# Patient Record
Sex: MALE | Race: WHITE | NOT HISPANIC OR LATINO | Employment: OTHER | URBAN - METROPOLITAN AREA
[De-identification: names, ages, dates, MRNs, and addresses within clinical notes are randomized per-mention and may not be internally consistent; named-entity substitution may affect disease eponyms.]

---

## 2019-09-15 ENCOUNTER — APPOINTMENT (OUTPATIENT)
Dept: CT IMAGING | Facility: HOSPITAL | Age: 84
End: 2019-09-15

## 2019-09-15 ENCOUNTER — APPOINTMENT (OUTPATIENT)
Dept: GENERAL RADIOLOGY | Facility: HOSPITAL | Age: 84
End: 2019-09-15

## 2019-09-15 ENCOUNTER — APPOINTMENT (OUTPATIENT)
Dept: CARDIOLOGY | Facility: HOSPITAL | Age: 84
End: 2019-09-15

## 2019-09-15 ENCOUNTER — HOSPITAL ENCOUNTER (INPATIENT)
Facility: HOSPITAL | Age: 84
LOS: 5 days | Discharge: HOME | End: 2019-09-20
Attending: INTERNAL MEDICINE | Admitting: INTERNAL MEDICINE

## 2019-09-15 ENCOUNTER — HOSPITAL ENCOUNTER (EMERGENCY)
Facility: HOSPITAL | Age: 84
Discharge: ANOTHER HOSPITAL | End: 2019-09-15
Admitting: EMERGENCY MEDICINE

## 2019-09-15 VITALS
TEMPERATURE: 98.3 F | OXYGEN SATURATION: 99 % | DIASTOLIC BLOOD PRESSURE: 53 MMHG | RESPIRATION RATE: 19 BRPM | BODY MASS INDEX: 25.18 KG/M2 | SYSTOLIC BLOOD PRESSURE: 110 MMHG | HEIGHT: 69 IN | HEART RATE: 70 BPM | WEIGHT: 170 LBS

## 2019-09-15 DIAGNOSIS — R53.1 WEAKNESS: ICD-10-CM
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
## 2019-09-15 DIAGNOSIS — I63.9 ISCHEMIC STROKE (HCC): ICD-10-CM

## 2019-09-15 LAB
ABO GROUP BLD: NORMAL
ALBUMIN SERPL-MCNC: 3.4 G/DL (ref 3.5–4.8)
ALBUMIN/GLOB SERPL: 1.1 G/DL (ref 1–1.7)
ALP SERPL-CCNC: 116 U/L (ref 32–91)
ALT SERPL W P-5'-P-CCNC: 17 U/L (ref 17–63)
ANION GAP SERPL CALCULATED.3IONS-SCNC: 10.9 MMOL/L (ref 5–15)
APTT PPP: 25.6 SECONDS (ref 24–31)
AST SERPL-CCNC: 24 U/L (ref 15–41)
BACTERIA UR QL AUTO: ABNORMAL /HPF
BASOPHILS # BLD AUTO: 0.1 10*3/MM3 (ref 0–0.2)
BASOPHILS NFR BLD AUTO: 2 % (ref 0–1.5)
BILIRUB SERPL-MCNC: 0.2 MG/DL (ref 0.3–1.2)
BILIRUB UR QL STRIP: NEGATIVE
BLD GP AB SCN SERPL QL: NEGATIVE
BUN BLD-MCNC: 20 MG/DL (ref 8–20)
BUN/CREAT SERPL: 14.3 (ref 6.2–20.3)
CALCIUM SPEC-SCNC: 8.9 MG/DL (ref 8.9–10.3)
CHLORIDE SERPL-SCNC: 107 MMOL/L (ref 101–111)
CLARITY UR: CLEAR
CO2 SERPL-SCNC: 25 MMOL/L (ref 22–32)
COLOR UR: YELLOW
CREAT BLD-MCNC: 1.4 MG/DL (ref 0.7–1.2)
DEPRECATED RDW RBC AUTO: 46.4 FL (ref 37–54)
EOSINOPHIL # BLD AUTO: 0.4 10*3/MM3 (ref 0–0.4)
EOSINOPHIL NFR BLD AUTO: 6.4 % (ref 0.3–6.2)
ERYTHROCYTE [DISTWIDTH] IN BLOOD BY AUTOMATED COUNT: 13.3 % (ref 12.3–15.4)
FOLATE SERPL-MCNC: 18.7 NG/ML (ref 5.9–24.8)
GFR SERPL CREATININE-BSD FRML MDRD: 48 ML/MIN/1.73
GLOBULIN UR ELPH-MCNC: 3 GM/DL (ref 2.5–3.8)
GLUCOSE BLD-MCNC: 109 MG/DL (ref 65–99)
GLUCOSE BLDC GLUCOMTR-MCNC: 103 MG/DL (ref 70–130)
GLUCOSE BLDC GLUCOMTR-MCNC: 98 MG/DL (ref 70–105)
GLUCOSE UR STRIP-MCNC: NEGATIVE MG/DL
HCT VFR BLD AUTO: 41 % (ref 37.5–51)
HGB BLD-MCNC: 14 G/DL (ref 13–17.7)
HGB UR QL STRIP.AUTO: ABNORMAL
HOLD SPECIMEN: NORMAL
HOLD SPECIMEN: NORMAL
HYALINE CASTS UR QL AUTO: ABNORMAL /LPF
INR PPP: 1.05 (ref 0.9–1.1)
KETONES UR QL STRIP: NEGATIVE
LEUKOCYTE ESTERASE UR QL STRIP.AUTO: NEGATIVE
LYMPHOCYTES # BLD AUTO: 1.6 10*3/MM3 (ref 0.7–3.1)
LYMPHOCYTES NFR BLD AUTO: 27.8 % (ref 19.6–45.3)
MCH RBC QN AUTO: 34.4 PG (ref 26.6–33)
MCHC RBC AUTO-ENTMCNC: 34.1 G/DL (ref 31.5–35.7)
MCV RBC AUTO: 101.1 FL (ref 79–97)
MONOCYTES # BLD AUTO: 0.6 10*3/MM3 (ref 0.1–0.9)
MONOCYTES NFR BLD AUTO: 10 % (ref 5–12)
NEUTROPHILS # BLD AUTO: 3.1 10*3/MM3 (ref 1.7–7)
NEUTROPHILS NFR BLD AUTO: 53.8 % (ref 42.7–76)
NITRITE UR QL STRIP: NEGATIVE
NRBC BLD AUTO-RTO: 0 /100 WBC (ref 0–0.2)
PH UR STRIP.AUTO: 7 [PH] (ref 5–8)
PHENYTOIN SERPL-MCNC: 8.9 MCG/ML (ref 10–20)
PLATELET # BLD AUTO: 160 10*3/MM3 (ref 140–450)
PMV BLD AUTO: 7.7 FL (ref 6–12)
POTASSIUM BLD-SCNC: 3.9 MMOL/L (ref 3.6–5.1)
PROT SERPL-MCNC: 6.4 G/DL (ref 6.1–7.9)
PROT UR QL STRIP: NEGATIVE
PROTHROMBIN TIME: 10.7 SECONDS (ref 9.6–11.7)
RBC # BLD AUTO: 4.05 10*6/MM3 (ref 4.14–5.8)
RBC # UR: ABNORMAL /HPF
REF LAB TEST METHOD: ABNORMAL
RH BLD: POSITIVE
SODIUM BLD-SCNC: 139 MMOL/L (ref 136–144)
SP GR UR STRIP: >=1.03 (ref 1–1.03)
SQUAMOUS #/AREA URNS HPF: ABNORMAL /HPF
T&S EXPIRATION DATE: NORMAL
TROPONIN I SERPL-MCNC: <0.03 NG/ML (ref 0–0.03)
TSH SERPL DL<=0.05 MIU/L-ACNC: 4.42 UIU/ML (ref 0.34–5.6)
UROBILINOGEN UR QL STRIP: ABNORMAL
VIT B12 BLD-MCNC: 276 PG/ML (ref 180–914)
WBC NRBC COR # BLD: 5.7 10*3/MM3 (ref 3.4–10.8)
WBC UR QL AUTO: ABNORMAL /HPF
WHOLE BLOOD HOLD SPECIMEN: NORMAL
WHOLE BLOOD HOLD SPECIMEN: NORMAL

## 2019-09-15 PROCEDURE — 93306 TTE W/DOPPLER COMPLETE: CPT | Performed by: INTERNAL MEDICINE

## 2019-09-15 PROCEDURE — 70498 CT ANGIOGRAPHY NECK: CPT

## 2019-09-15 PROCEDURE — 93005 ELECTROCARDIOGRAM TRACING: CPT | Performed by: PHYSICIAN ASSISTANT

## 2019-09-15 PROCEDURE — 71045 X-RAY EXAM CHEST 1 VIEW: CPT

## 2019-09-15 PROCEDURE — 25010000002 ALTEPLASE PER 1 MG: Performed by: NURSE PRACTITIONER

## 2019-09-15 PROCEDURE — 86850 RBC ANTIBODY SCREEN: CPT | Performed by: PHYSICIAN ASSISTANT

## 2019-09-15 PROCEDURE — 0042T HC CT CEREBRAL PERFUSION W/WO CONTRAST: CPT

## 2019-09-15 PROCEDURE — 99214 OFFICE O/P EST MOD 30 MIN: CPT | Performed by: NURSE PRACTITIONER

## 2019-09-15 PROCEDURE — 82962 GLUCOSE BLOOD TEST: CPT

## 2019-09-15 PROCEDURE — 82746 ASSAY OF FOLIC ACID SERUM: CPT | Performed by: PSYCHIATRY & NEUROLOGY

## 2019-09-15 PROCEDURE — 86900 BLOOD TYPING SEROLOGIC ABO: CPT

## 2019-09-15 PROCEDURE — 86901 BLOOD TYPING SEROLOGIC RH(D): CPT

## 2019-09-15 PROCEDURE — 0 IOPAMIDOL PER 1 ML: Performed by: INTERNAL MEDICINE

## 2019-09-15 PROCEDURE — 85610 PROTHROMBIN TIME: CPT | Performed by: PHYSICIAN ASSISTANT

## 2019-09-15 PROCEDURE — 25010000002 FOSPHENYTOIN 500 MG PE/10ML SOLUTION 10 ML VIAL: Performed by: PSYCHIATRY & NEUROLOGY

## 2019-09-15 PROCEDURE — 93306 TTE W/DOPPLER COMPLETE: CPT

## 2019-09-15 PROCEDURE — 85730 THROMBOPLASTIN TIME PARTIAL: CPT | Performed by: PHYSICIAN ASSISTANT

## 2019-09-15 PROCEDURE — 0 IOPAMIDOL PER 1 ML: Performed by: PHYSICIAN ASSISTANT

## 2019-09-15 PROCEDURE — 86901 BLOOD TYPING SEROLOGIC RH(D): CPT | Performed by: PHYSICIAN ASSISTANT

## 2019-09-15 PROCEDURE — 70496 CT ANGIOGRAPHY HEAD: CPT

## 2019-09-15 PROCEDURE — 86900 BLOOD TYPING SEROLOGIC ABO: CPT | Performed by: PHYSICIAN ASSISTANT

## 2019-09-15 PROCEDURE — 80185 ASSAY OF PHENYTOIN TOTAL: CPT | Performed by: PSYCHIATRY & NEUROLOGY

## 2019-09-15 PROCEDURE — 81001 URINALYSIS AUTO W/SCOPE: CPT | Performed by: INTERNAL MEDICINE

## 2019-09-15 PROCEDURE — 80053 COMPREHEN METABOLIC PANEL: CPT | Performed by: PHYSICIAN ASSISTANT

## 2019-09-15 PROCEDURE — 84443 ASSAY THYROID STIM HORMONE: CPT | Performed by: PSYCHIATRY & NEUROLOGY

## 2019-09-15 PROCEDURE — 99291 CRITICAL CARE FIRST HOUR: CPT

## 2019-09-15 PROCEDURE — 25010000002 ALTEPLASE PER 1 MG

## 2019-09-15 PROCEDURE — 85025 COMPLETE CBC W/AUTO DIFF WBC: CPT | Performed by: PHYSICIAN ASSISTANT

## 2019-09-15 PROCEDURE — 70450 CT HEAD/BRAIN W/O DYE: CPT

## 2019-09-15 PROCEDURE — 82607 VITAMIN B-12: CPT | Performed by: PSYCHIATRY & NEUROLOGY

## 2019-09-15 PROCEDURE — 99221 1ST HOSP IP/OBS SF/LOW 40: CPT | Performed by: PSYCHIATRY & NEUROLOGY

## 2019-09-15 PROCEDURE — 84484 ASSAY OF TROPONIN QUANT: CPT | Performed by: PHYSICIAN ASSISTANT

## 2019-09-15 RX ORDER — ACETYLCYSTEINE 200 MG/ML
600 SOLUTION ORAL; RESPIRATORY (INHALATION)
Status: COMPLETED | OUTPATIENT
Start: 2019-09-15 | End: 2019-09-17

## 2019-09-15 RX ORDER — SODIUM CHLORIDE 0.9 % (FLUSH) 0.9 %
10 SYRINGE (ML) INJECTION EVERY 12 HOURS SCHEDULED
Status: DISCONTINUED | OUTPATIENT
Start: 2019-09-15 | End: 2019-09-20 | Stop reason: HOSPADM

## 2019-09-15 RX ORDER — ASPIRIN 81 MG/1
81 TABLET, CHEWABLE ORAL DAILY
COMMUNITY
End: 2019-11-06 | Stop reason: DRUGHIGH

## 2019-09-15 RX ORDER — ASPIRIN 325 MG
325 TABLET ORAL DAILY
Status: DISCONTINUED | OUTPATIENT
Start: 2019-09-16 | End: 2019-09-18

## 2019-09-15 RX ORDER — SODIUM CHLORIDE 0.9 % (FLUSH) 0.9 %
10 SYRINGE (ML) INJECTION AS NEEDED
Status: DISCONTINUED | OUTPATIENT
Start: 2019-09-15 | End: 2019-09-20 | Stop reason: HOSPADM

## 2019-09-15 RX ORDER — ACETAMINOPHEN 325 MG/1
650 TABLET ORAL EVERY 6 HOURS PRN
Status: DISCONTINUED | OUTPATIENT
Start: 2019-09-15 | End: 2019-09-20 | Stop reason: HOSPADM

## 2019-09-15 RX ORDER — SODIUM CHLORIDE 9 MG/ML
100 INJECTION, SOLUTION INTRAVENOUS CONTINUOUS
Status: DISCONTINUED | OUTPATIENT
Start: 2019-09-15 | End: 2019-09-15

## 2019-09-15 RX ORDER — ASPIRIN 300 MG/1
300 SUPPOSITORY RECTAL DAILY
Status: DISCONTINUED | OUTPATIENT
Start: 2019-09-16 | End: 2019-09-18

## 2019-09-15 RX ORDER — SODIUM CHLORIDE 0.9 % (FLUSH) 0.9 %
10 SYRINGE (ML) INJECTION AS NEEDED
Status: DISCONTINUED | OUTPATIENT
Start: 2019-09-15 | End: 2019-09-15 | Stop reason: HOSPADM

## 2019-09-15 RX ORDER — ATORVASTATIN CALCIUM 80 MG/1
80 TABLET, FILM COATED ORAL NIGHTLY
Status: DISCONTINUED | OUTPATIENT
Start: 2019-09-15 | End: 2019-09-18

## 2019-09-15 RX ORDER — SODIUM CHLORIDE 9 MG/ML
100 INJECTION, SOLUTION INTRAVENOUS ONCE
Status: COMPLETED | OUTPATIENT
Start: 2019-09-15 | End: 2019-09-15

## 2019-09-15 RX ORDER — PHENYTOIN SODIUM 100 MG/1
200 CAPSULE, EXTENDED RELEASE ORAL EVERY 12 HOURS SCHEDULED
Status: DISCONTINUED | OUTPATIENT
Start: 2019-09-16 | End: 2019-09-16

## 2019-09-15 RX ORDER — SODIUM CHLORIDE 9 MG/ML
100 INJECTION, SOLUTION INTRAVENOUS CONTINUOUS
Status: DISCONTINUED | OUTPATIENT
Start: 2019-09-15 | End: 2019-09-16

## 2019-09-15 RX ORDER — PHENYTOIN SODIUM 100 MG/1
CAPSULE, EXTENDED RELEASE ORAL
COMMUNITY
End: 2019-09-20 | Stop reason: HOSPADM

## 2019-09-15 RX ADMIN — SODIUM CHLORIDE 100 ML/HR: 9 INJECTION, SOLUTION INTRAVENOUS at 21:36

## 2019-09-15 RX ADMIN — ATORVASTATIN CALCIUM 80 MG: 80 TABLET, FILM COATED ORAL at 21:29

## 2019-09-15 RX ADMIN — ALTEPLASE 69.3 MG: KIT at 07:38

## 2019-09-15 RX ADMIN — SODIUM CHLORIDE 500 MG PE: 9 INJECTION, SOLUTION INTRAVENOUS at 21:30

## 2019-09-15 RX ADMIN — ACETYLCYSTEINE 600 MG: 200 SOLUTION ORAL; RESPIRATORY (INHALATION) at 21:29

## 2019-09-15 RX ADMIN — IOPAMIDOL 150 ML: 755 INJECTION, SOLUTION INTRAVENOUS at 09:18

## 2019-09-15 RX ADMIN — SODIUM CHLORIDE, PRESERVATIVE FREE 10 ML: 5 INJECTION INTRAVENOUS at 13:39

## 2019-09-15 RX ADMIN — ALTEPLASE 69.3 MG: KIT at 07:36

## 2019-09-15 RX ADMIN — SODIUM CHLORIDE, PRESERVATIVE FREE 10 ML: 5 INJECTION INTRAVENOUS at 21:30

## 2019-09-15 RX ADMIN — IOPAMIDOL 100 ML: 755 INJECTION, SOLUTION INTRAVENOUS at 07:09

## 2019-09-15 RX ADMIN — ACETAMINOPHEN 650 MG: 325 TABLET, FILM COATED ORAL at 21:29

## 2019-09-15 RX ADMIN — SODIUM CHLORIDE 100 ML: 900 INJECTION, SOLUTION INTRAVENOUS at 08:24

## 2019-09-15 RX ADMIN — SODIUM CHLORIDE 100 ML/HR: 9 INJECTION, SOLUTION INTRAVENOUS at 11:21

## 2019-09-15 NOTE — PLAN OF CARE
Problem: Patient Care Overview  Goal: Plan of Care Review  Outcome: Ongoing (interventions implemented as appropriate)   09/15/19 9116   Coping/Psychosocial   Plan of Care Reviewed With patient   Plan of Care Review   Progress improving   OTHER   Outcome Summary Pt transferred to [facility] from [facility] post TPA. CTA done upon arrival and showed no clot and did not require intervention. Symptoms seem to have resolved. Echo completed. MRI ordered but unable to complete due to the pacemaker being MRI incompatible. IVF started. Pt having a lot of urgency and burning when urinating - ua sent. Khai attempted camp placement but were unsuccessful. Bladder scan completed and pt not retaining. NPO until speech eval in AM. CT scan to be done in AM. Vitals stable. Will continue to monitor per orders.      Goal: Individualization and Mutuality  Outcome: Ongoing (interventions implemented as appropriate)    Goal: Discharge Needs Assessment  Outcome: Ongoing (interventions implemented as appropriate)    Goal: Interprofessional Rounds/Family Conf  Outcome: Ongoing (interventions implemented as appropriate)

## 2019-09-15 NOTE — NURSING NOTE
MRI tech called and said the type of pacemaker the patient has is unsafe for MRI.   Pt's family brought in pacemaker card and confirmed the pacemaker is incompatible with MRI.

## 2019-09-15 NOTE — CONSULTS
Neurology Note    Patient:  Neptali Vera    YOB: 1933    REFERRING PHYSICIAN:  Aniceto Dexter MD    CHIEF COMPLAINT:    Left sided weakness    HISTORY OF PRESENT ILLNESS:   The patient is a 85 y.o. male with SSS, s/p PPM, HTN, seizure disorder. He got up to the BR around 0530 today, had a fall, no trauma that alerted his family. He seemed normal after after the fall but shortly after started drooling and not following commands, noted to have left sided facial droop and weakness. Taken to Ed at [place] where he was noted to have left arm and leg numbness and weakness, left facial weakness, slow speech, NIH 6, /119, CT head negative for bleed, CTA w distal right M1 occlusion. He was given IV TPA, transferred here for a possible thrombectomy. H5s CT/CTP here were negative, CTA without MCA thrombus, question of distal LVA occlusion. His symptoms have resolved at this point. He has had what family reports as TIAs in the past, started on Dilantin for possible seizures, incomplete data base.      Past Medical History:  Past Medical History:   Diagnosis Date   • Hypertension    • Seizure (CMS/HCC)        Past Surgical History:  No past surgical history on file.    Social History:   Social History     Socioeconomic History   • Marital status:      Spouse name: Not on file   • Number of children: Not on file   • Years of education: Not on file   • Highest education level: Not on file   Tobacco Use   • Smoking status: Never Smoker   • Smokeless tobacco: Never Used   Substance and Sexual Activity   • Alcohol use: No     Frequency: Never   • Drug use: No   • Sexual activity: Defer        Family History:   No family history on file.    Medications Prior to Admission:    Prior to Admission medications    Medication Sig Start Date End Date Taking? Authorizing Provider   aspirin 81 MG chewable tablet Chew 81 mg Daily.    Provider, MD Lis   phenytoin (DILANTIN) 100 MG ER capsule Take  by mouth.     Provider, Historical, MD       Allergies:  Patient has no known allergies.      Review of system  Review of Systems   Neurological: Negative for weakness.   All other systems reviewed and are negative.      Vitals:    09/15/19 1045   BP:    Pulse: 61   Resp:    SpO2: 99%       Physical exam  Physical Exam   Constitutional: He is oriented to person, place, and time. He appears well-developed and well-nourished.   Eyes: EOM are normal. Pupils are equal, round, and reactive to light.   Neck: Carotid bruit is not present.   Cardiovascular: Normal rate and regular rhythm.   Pulmonary/Chest: Effort normal.   Neurological: He is alert and oriented to person, place, and time. He has normal strength and normal reflexes. No cranial nerve deficit or sensory deficit. He displays no Babinski's sign on the right side. He displays no Babinski's sign on the left side.   Psychiatric: He has a normal mood and affect. His behavior is normal. Thought content normal.         Lab Results   Component Value Date    WBC 5.70 09/15/2019    HGB 14.0 09/15/2019    HCT 41.0 09/15/2019    .1 (H) 09/15/2019     09/15/2019     Lab Results   Component Value Date    GLUCOSE 109 (H) 09/15/2019    BUN 20 09/15/2019    CREATININE 1.40 (H) 09/15/2019    EGFRIFNONA 48 (L) 09/15/2019    BCR 14.3 09/15/2019    CO2 25.0 09/15/2019    CALCIUM 8.9 09/15/2019    ALBUMIN 3.40 (L) 09/15/2019    AST 24 09/15/2019    ALT 17 09/15/2019     Contains abnormal data ECG 12 Lead   Order: [id number]   Status:  Preliminary result   Visible to patient:  No (Not Released)   Next appt:  None      ED Interpretation     Liz Nicole PA     9/15/2019  8:51 AM  ECG 12 Lead    Date/Time: 9/15/2019 7:32 AM  Performed by: Liz Nicole PA  Authorized by: Liz Nicole PA   Interpreted by physician (Dr. Hartman)  Previous ECG: no previous ECG available  Rhythm: paced  Rate: normal  BPM: 61  Conduction: non-specific intraventricular conduction delay  ST  Segments: ST segments normal  T Waves: T waves normal  Other: no other findings  Clinical impression: abnormal ECG   Interpreted by Liz Nicole PA on 9/15/2019  8:51 AM       Last Resulted: 09/15/19 08:51               Radiological Studies:   Ct Angiogram Head With & Without Contrast    Result Date: 9/15/2019  CT ANGIOGRAM HEAD AND NECK AND CT PERFUSION STUDY  CLINICAL HISTORY: Stroke.  Radiation dose reduction techniques were utilized, including automated exposure control and exposure modulation based on body size. CT scan of the head was obtained with 3 mm axial images without the use of IV contrast. The patient underwent a CT perfusion study with a dynamic bolus of IV contrast. Standard perfusion maps were constructed. The patient then underwent a CT angiogram of the head and neck with 1 mm axial images following the administration of IV contrast. Sagittal, coronal, and 3-dimensional reconstructed images were obtained.  Percent stenosis was assessed in accordance with NASCET criteria.  COMPARISON: None available  FINDINGS:  NONCONTRAST HEAD CT: Residual contrast material is present in the vasculature related to prior imaging, limiting assessment for intracranial hemorrhage. No acute hemorrhage or hydrocephalus is identified. No midline shift. Hypodensities at the bilateral occipital lobes suggest chronic or potentially subacute areas of infarct. Mild to moderate periventricular hypodensities suggest chronic small vessel ischemic change in a patient this age.  No enhancing lesions the brain are noted following contrast material administration.   CT PERFUSION STUDY:  No CBF (under 30%) deficit or perfusion abnormality is demonstrated where the T MAX is greater than 6 seconds. The calculated mismatch volume was 0 cc.  CTA HEAD and neck: The CT angiogram of the head and neck demonstrates occlusion of a 7 mm segment of the intracanalicular portion of the left vertebral artery, for example image 95 of series 6.  Left vertebral artery is dominant. Focal narrowing is apparent at the junction of the right vertebral artery and the basilar artery, percent stenosis difficult to characterize owing to very small caliber of the vessel. Scattered areas of mild arterial narrowing are seen (0-49% narrowing), for example 40% narrowing at the origin of the left vertebral artery, estimated 40% narrowing in the supraclinoid segment of the right ICA. The right A1 segment is apparently undeveloped, the anterior circulation being provided from the left. The bilateral posterior cerebral arteries are fairly diminutive, but without focal high-grade stenosis. Otherwise, no hemodynamically significant focal stenosis, aneurysm, or dissection in the cervical carotid or vertebral arteries, or in the arteries at the base of the brain.   Fibronodular changes at the lung apices. Left-sided pacemaker, only partly included.  Thyroid nodules are evident, follow-up thyroid ultrasound can be obtained.         1. No perfusion abnormality to suggest completed or threatened acute infarct. 2. 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 Scattered areas of arterial narrowing, as detailed above.  3. No acute intracranial hemorrhage or hydrocephalus identified, limited by pre-existing intravascular contrast material on the unenhanced portion of the exam. No enhancing lesions of brain. Hypodensity in the bilateral occipital lobes suggesting chronic to subacute areas infarct. 4. Thyroid nodularity.      Discussed by telephone with Dr. Braun at [phone], 09/15/2019  This report was finalized on 9/15/2019 9:48 AM by Dr. Geovanni Martinez M.D.      Ct Angiogram Head With & Without Contrast    Result Date: 9/15/2019  Distal right M1 occlusion without significant collateral formation and asymmetric enhancement of the right cerebral hemisphere concerning for acute occlusion. Right HAYDEE supplied by the left sided circulation via prominent  ACOM. No right A1 segment is seen. Irregular calcified and non calcified plaque within the right clinoid and supraclinoid segments of the right ICA. Mild non hemodynamically significant plaque within the bilateral proximal ICAs within the neck. Patent vertebral arteries. Full report to follow by IR. Findings discussed with TAMMIE Barnes with Neurology at 7:52 am.     Ct Angiogram Neck With & Without Contrast    Result Date: 9/15/2019  CT ANGIOGRAM HEAD AND NECK AND CT PERFUSION STUDY  CLINICAL HISTORY: Stroke.  Radiation dose reduction techniques were utilized, including automated exposure control and exposure modulation based on body size. CT scan of the head was obtained with 3 mm axial images without the use of IV contrast. The patient underwent a CT perfusion study with a dynamic bolus of IV contrast. Standard perfusion maps were constructed. The patient then underwent a CT angiogram of the head and neck with 1 mm axial images following the administration of IV contrast. Sagittal, coronal, and 3-dimensional reconstructed images were obtained.  Percent stenosis was assessed in accordance with NASCET criteria.  COMPARISON: None available  FINDINGS:  NONCONTRAST HEAD CT: Residual contrast material is present in the vasculature related to prior imaging, limiting assessment for intracranial hemorrhage. No acute hemorrhage or hydrocephalus is identified. No midline shift. Hypodensities at the bilateral occipital lobes suggest chronic or potentially subacute areas of infarct. Mild to moderate periventricular hypodensities suggest chronic small vessel ischemic change in a patient this age.  No enhancing lesions the brain are noted following contrast material administration.   CT PERFUSION STUDY:  No CBF (under 30%) deficit or perfusion abnormality is demonstrated where the T MAX is greater than 6 seconds. The calculated mismatch volume was 0 cc.  CTA HEAD and neck: The CT angiogram of the head and neck demonstrates  occlusion of a 7 mm segment of the intracanalicular portion of the left vertebral artery, for example image 95 of series 6. Left vertebral artery is dominant. Focal narrowing is apparent at the junction of the right vertebral artery and the basilar artery, percent stenosis difficult to characterize owing to very small caliber of the vessel. Scattered areas of mild arterial narrowing are seen (0-49% narrowing), for example 40% narrowing at the origin of the left vertebral artery, estimated 40% narrowing in the supraclinoid segment of the right ICA. The right A1 segment is apparently undeveloped, the anterior circulation being provided from the left. The bilateral posterior cerebral arteries are fairly diminutive, but without focal high-grade stenosis. Otherwise, no hemodynamically significant focal stenosis, aneurysm, or dissection in the cervical carotid or vertebral arteries, or in the arteries at the base of the brain.   Fibronodular changes at the lung apices. Left-sided pacemaker, only partly included.  Thyroid nodules are evident, follow-up thyroid ultrasound can be obtained.         1. No perfusion abnormality to suggest completed or threatened acute infarct. 2. 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 Scattered areas of arterial narrowing, as detailed above.  3. No acute intracranial hemorrhage or hydrocephalus identified, limited by pre-existing intravascular contrast material on the unenhanced portion of the exam. No enhancing lesions of brain. Hypodensity in the bilateral occipital lobes suggesting chronic to subacute areas infarct. 4. Thyroid nodularity.      Discussed by telephone with Dr. Braun at [phone], 09/15/2019  This report was finalized on 9/15/2019 9:48 AM by Dr. Geovanni Martinez M.D.      Ct Angiogram Neck With & Without Contrast    Result Date: 9/15/2019  Please see prelim report under CTA Head. Full report to follow by IR.    Xr Chest 1 View    Result  Date: 9/15/2019  Examination: XR CHEST 1 VW-  Date of Exam: 9/15/2019 7:45 AM  Indication: Acute Stroke Protocol (onset < 12 hrs).  Comparison: None available  Technique: Portable AP view of the chest was obtained.  Findings: There is a left-sided chest wall pacemaker with leads projecting over the right atrium and right ventricle. There is mild cardiomegaly. There are coarsened interstitial markings likely related to underlying chronic lung disease or less likely interstitial edema. The lung bases appear clear. There is no pleural effusion or pneumothorax. Multilevel degenerative changes of the thoracic spine.      Mild cardiomegaly with coarsened interstitial markings likely related to underlying chronic lung disease with interstitial edema felt to be less likely.  Electronically Signed By-Mick George On:9/15/2019 7:56 AM This report was finalized on [id number] by  Mick George, .    Ct Head Without Contrast Stroke Protocol    Result Date: 9/15/2019  EXAMINATION: CT HEAD WO CONTRAST STROKE PROTOCOL DATE: 9/15/2019 6:50 AM  INDICATION: Status post fall, loss of consciousness  COMPARISON: None available.  TECHNIQUE: Thin section noncontrast axial images were obtained through the head. Coronal reformats were created.  CT dose lowering techniques were used, to include: automated exposure control, adjustment for patient size, and or use of iterative reconstruction.  FINDINGS:  Intracranial contents: No acute intracranial hemorrhage, evidence of acute territorial infarct, mass, mass effect or hydrocephalus. Extensive intracranial atherosclerosis. Extensive chronic small vessel ischemic changes in the white matter. Chronic lacunar infarct in the right  caudate head. Chronic infarcts in the bilateral occipital lobes and bilateral cerebellum. Moderate cerebral volume loss. Bones and extracranial soft tissues:  No fracture or focal osseous lesion in the calvarium or skull base. Visualized paranasal sinuses and  mastoid air cells are clear. Bilateral cataract surgery.      1. No acute intracranial abnormality visible by CT. 2. Chronic infarcts in the bilateral occipital lobes, bilateral cerebellum and right caudate head. Extensive chronic small vessel ischemic change in the white matter. 3. No fracture.  Discussed with LeanaArlette Nicole in the emergency room at 7:03 AM on 9/15/2019. This examination was interpreted by Ford Richter M.D. Electronically signed by:  Ford Richter M.D.  9/15/2019 5:06 AM    Ct Cerebral Perfusion With & Without Contrast    Result Date: 9/15/2019  CT ANGIOGRAM HEAD AND NECK AND CT PERFUSION STUDY  CLINICAL HISTORY: Stroke.  Radiation dose reduction techniques were utilized, including automated exposure control and exposure modulation based on body size. CT scan of the head was obtained with 3 mm axial images without the use of IV contrast. The patient underwent a CT perfusion study with a dynamic bolus of IV contrast. Standard perfusion maps were constructed. The patient then underwent a CT angiogram of the head and neck with 1 mm axial images following the administration of IV contrast. Sagittal, coronal, and 3-dimensional reconstructed images were obtained.  Percent stenosis was assessed in accordance with NASCET criteria.  COMPARISON: None available  FINDINGS:  NONCONTRAST HEAD CT: Residual contrast material is present in the vasculature related to prior imaging, limiting assessment for intracranial hemorrhage. No acute hemorrhage or hydrocephalus is identified. No midline shift. Hypodensities at the bilateral occipital lobes suggest chronic or potentially subacute areas of infarct. Mild to moderate periventricular hypodensities suggest chronic small vessel ischemic change in a patient this age.  No enhancing lesions the brain are noted following contrast material administration.   CT PERFUSION STUDY:  No CBF (under 30%) deficit or perfusion abnormality is demonstrated where the T MAX is  greater than 6 seconds. The calculated mismatch volume was 0 cc.  CTA HEAD and neck: The CT angiogram of the head and neck demonstrates occlusion of a 7 mm segment of the intracanalicular portion of the left vertebral artery, for example image 95 of series 6. Left vertebral artery is dominant. Focal narrowing is apparent at the junction of the right vertebral artery and the basilar artery, percent stenosis difficult to characterize owing to very small caliber of the vessel. Scattered areas of mild arterial narrowing are seen (0-49% narrowing), for example 40% narrowing at the origin of the left vertebral artery, estimated 40% narrowing in the supraclinoid segment of the right ICA. The right A1 segment is apparently undeveloped, the anterior circulation being provided from the left. The bilateral posterior cerebral arteries are fairly diminutive, but without focal high-grade stenosis. Otherwise, no hemodynamically significant focal stenosis, aneurysm, or dissection in the cervical carotid or vertebral arteries, or in the arteries at the base of the brain.   Fibronodular changes at the lung apices. Left-sided pacemaker, only partly included.  Thyroid nodules are evident, follow-up thyroid ultrasound can be obtained.         1. No perfusion abnormality to suggest completed or threatened acute infarct. 2. 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 Scattered areas of arterial narrowing, as detailed above.  3. No acute intracranial hemorrhage or hydrocephalus identified, limited by pre-existing intravascular contrast material on the unenhanced portion of the exam. No enhancing lesions of brain. Hypodensity in the bilateral occipital lobes suggesting chronic to subacute areas infarct. 4. Thyroid nodularity.      Discussed by telephone with Dr. Braun at [phone], 09/15/2019  This report was finalized on 9/15/2019 9:48 AM by Dr. Geovanni Martinez M.D.        During this visit the following  were done:  Labs Reviewed []    Labs Ordered []    Radiology Reports Reviewed []    Radiology Ordered []    EKG, echo, and/or stress test reviewed []    EEG results reviewed  []    EEG reviewed and interpreted per myself   []    Discussed case with neurointerventionalist or neuroradiologist []    Referring Provider Records Reviewed []    ER Records Reviewed []    Hospital Records Reviewed []    History Obtained From Family []    Radiological images view and Interpreted per myself []    Case Discussed with referring provider []     Decision to obtain and request outside records  []        Assessment and Plan     Threatened RMCA stroke, 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 Less likely partial seizure. Likely cardiac embolic event. LVA occlusion likely incidental to symptoms. Question of partial seizure d/o vs TIAs.     - ICU observation per TPA protocol.   - TTE.   - CTH in am.   - MRI brain w PPM protocol if compatible.   - EEG.   - b12, folate, TSH, phenytoin level.   - NPO until cleared.   - IVF.   - SBP >120, <180.   - Cardiology consult for PPM check.   - ST, OT, PT.   - Consider starting oral AC on discharge.      Thanks,        Electronically signed by Obinna Braun MD on 9/15/2019 at 11:02 AM

## 2019-09-15 NOTE — H&P
Patient Care Team:  Provider, No Known as PCP - General    Chief complaint:  Suspected stroke    History of present illness:  Subjective   History is limited.  He is not confused but somewhat poor historian.    This is an 85-year-old male patient with history of HTN and sick sinus syndrome.  He stated that he got up to use the bathroom and fell so he was taken to the ED.  He stated that he was not sure whether he got dizzy before the event but denies weakness, numbness or seizure-like activities.    Per reports, he was found by family with left-sided weakness and slurred speech.  He was taken to [facility] ED and had TPA.  CT perfusion images of the brain suspected right MCA occlusion.  Our neurology and neuro interventional service was contacted and they recommended transfer to our facility for possible intervention.  However, on arrival to our intensive care unit, patient was noted to be normal on the exam with no apparent weakness and his NIH was 1 initially.  His CT perfusion images were normal.        Review of Systems:  Constitutional: No fever or chills.   ENMT: No sinus congestion or postnasal drip  Cardiovascular: No chest pain, palpitation or legs swelling.    Respiratory: No dyspnea, cough or wheezing.  Gastrointestinal: No constipation, diarrhea or abdominal pain   Neurology: No headache, weakness, numbness or dizziness.   Musculoskeletal: No joints pain, stiffness or swelling.   Psychiatry: No depression or anxiety.  Genitourinary: Reported dysuria and urinary frequency.  Per staff, he has been urinating about 20 mL at the time multiple times  Endo: No weight changes. No cold or warm intolerance.  Lymphatic: No swollen glands.  Integumentary: No rash.    History  Past Medical History:   Diagnosis Date   • Hypertension    • Seizure (CMS/HCC)    · Sick sinus syndrome   · Hyperlipidemia      PSH:  • CARDIAC PACEMAKER PLACEMENT 8/10/2009 Medtronic dual chamber   • CATARACT EXTRACTION,  BILATERAL Bilateral       Social History     Tobacco Use   • Smoking status: Never Smoker   • Smokeless tobacco: Never Used   Substance Use Topics   • Alcohol use: No     Frequency: Never   • Drug use: No         Medications Prior to Admission   Medication Sig Dispense Refill Last Dose   • aspirin 81 MG chewable tablet Chew 81 mg Daily.      • phenytoin (DILANTIN) 100 MG ER capsule Take  by mouth.      · Lisinopril (PRINIVIL,ZESTRIL) 40 MG tablet daily  · Vitamin D    Allergies:  Patient has no known allergies.     Family hx:  -ve for CAD and stroke    Objective   Vital Signs  Temp:  [97.4 °F (36.3 °C)-98.7 °F (37.1 °C)] 98.7 °F (37.1 °C)  Heart Rate:  [57-87] 60  Resp:  [16-25] 16  BP: (102-174)/() 129/77    Physical Exam:  Constitutional: Not in acute distress.  Elderly white male patient appears to be at stated age 1  Eyes: Injected conjunctiva, EOMI. Pupils equal and reactive to light.   ENMT: Yin 3. No oral thrush. Tonsils grade 1  Neck: No thyromegaly.  Trachea midline  Heart: RRR, no murmur.  No pitting edema  Lungs: Good and equal air entry bilaterally.  Nonlabored breathing  Abdomen: Obese. Soft. No tenderness or dullness.  Positive bowel sounds  Extremities: No cyanosis, clubbing. Moves all extremities.  Warm extremities and well-perfused  Neuro: Conscious, alert, oriented x3.  Strength 5/5 overall  Psych: Appropriate mood and affect.    Integumentary: No rash or ecchymosis  Lymphatic: No palpable cervical or supraclavicular lymph nodes.      Diagnostic imaging:  I personally and independently reviewed the following images:   CXR 9/15/2019: Cardiomegaly.  Pacemaker.  No pulmonary infiltrates  CT brain: No acute infarct      Assessment   1. Suspected RMCA ischemic stroke s/p TPA 9/15/2019  2. 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 MICHELA vs CKD  4. Essential hypertension  5. Dyslipidemia  6. Sick sinus syndrome  7. Urinary frequency and dysuria  8. Microscopic  hematuria      Plan:  · Admit to ICU.  Neuro check.  CT perfusion images performed on arrival and showed no acute process.  Unclear whether his thrombus has resolved with TPA.  Will monitor post TPA for change in neuro status. obtain CT brain as needed if changes occur  · Neurology consulted  · Anticoagulation with aspirin 24 hours after TPA.  Start statin.  Lipid panel and A1c. Echo  · PT OT and speech eval  · Hold Lisinopril due to MICHELA and recent contrast  · Start IV hydration and Mucomyst for the same, to prevent contrast-induced nephropathy  · Checked UA.  1+ bacteria but no WBC, nitrite or leukocyte esterase.  Hold off on antibiotic.  Bladder scan performed and showed no retention.  Suspect his symptoms are related to attempts to insert the Lemus catheter.  We will place him on alpha Zosyn for symptoms persist but I will hold off for now in fear of dropping his blood pressure in the setting of acute stroke      I discussed the patients findings and my recommendations with patient, nursing staff and consulting provider.  (Dr. Mojica)    Aniceto Dexter MD  09/15/19  3:50 PM    Time: Critical care 36 min      This note was dictated utilizing Dragon dictation

## 2019-09-16 ENCOUNTER — APPOINTMENT (OUTPATIENT)
Dept: CT IMAGING | Facility: HOSPITAL | Age: 84
End: 2019-09-16

## 2019-09-16 LAB
ALBUMIN SERPL-MCNC: 3.2 G/DL (ref 3.5–5.2)
ANION GAP SERPL CALCULATED.3IONS-SCNC: 11.2 MMOL/L (ref 5–15)
AORTIC DIMENSIONLESS INDEX: 0.9 (DI)
BASOPHILS # BLD AUTO: 0.08 10*3/MM3 (ref 0–0.2)
BASOPHILS NFR BLD AUTO: 1.1 % (ref 0–1.5)
BH CV ECHO MEAS - ACS: 2.3 CM
BH CV ECHO MEAS - AI DEC SLOPE: 220 CM/SEC^2
BH CV ECHO MEAS - AI MAX PG: 60.5 MMHG
BH CV ECHO MEAS - AI MAX VEL: 389 CM/SEC
BH CV ECHO MEAS - AI P1/2T: 517.9 MSEC
BH CV ECHO MEAS - AO MEAN PG (FULL): 0 MMHG
BH CV ECHO MEAS - AO MEAN PG: 2 MMHG
BH CV ECHO MEAS - AO V2 MAX: 139 CM/SEC
BH CV ECHO MEAS - AO V2 MEAN: 65.9 CM/SEC
BH CV ECHO MEAS - AO V2 VTI: 21.7 CM
BH CV ECHO MEAS - AVA(I,A): 3.4 CM^2
BH CV ECHO MEAS - AVA(I,D): 3.4 CM^2
BH CV ECHO MEAS - BSA(HAYCOCK): 1.9 M^2
BH CV ECHO MEAS - BSA: 1.9 M^2
BH CV ECHO MEAS - BZI_BMI: 23.9 KILOGRAMS/M^2
BH CV ECHO MEAS - BZI_METRIC_HEIGHT: 175.3 CM
BH CV ECHO MEAS - BZI_METRIC_WEIGHT: 73.5 KG
BH CV ECHO MEAS - EDV(CUBED): 97.3 ML
BH CV ECHO MEAS - EDV(MOD-SP2): 62 ML
BH CV ECHO MEAS - EDV(MOD-SP4): 71 ML
BH CV ECHO MEAS - EDV(TEICH): 97.3 ML
BH CV ECHO MEAS - EF(CUBED): 59.6 %
BH CV ECHO MEAS - EF(MOD-SP2): 50 %
BH CV ECHO MEAS - EF(MOD-SP4): 54.9 %
BH CV ECHO MEAS - EF(TEICH): 51.3 %
BH CV ECHO MEAS - ESV(CUBED): 39.3 ML
BH CV ECHO MEAS - ESV(MOD-SP2): 31 ML
BH CV ECHO MEAS - ESV(MOD-SP4): 32 ML
BH CV ECHO MEAS - ESV(TEICH): 47.4 ML
BH CV ECHO MEAS - FS: 26.1 %
BH CV ECHO MEAS - IVS/LVPW: 1
BH CV ECHO MEAS - IVSD: 0.8 CM
BH CV ECHO MEAS - LA DIMENSION: 6 CM
BH CV ECHO MEAS - LAT PEAK E' VEL: 8 CM/SEC
BH CV ECHO MEAS - LV DIASTOLIC VOL/BSA (35-75): 37.6 ML/M^2
BH CV ECHO MEAS - LV MASS(C)D: 117.9 GRAMS
BH CV ECHO MEAS - LV MASS(C)DI: 62.4 GRAMS/M^2
BH CV ECHO MEAS - LV MEAN PG: 2 MMHG
BH CV ECHO MEAS - LV SYSTOLIC VOL/BSA (12-30): 16.9 ML/M^2
BH CV ECHO MEAS - LV V1 MAX: 98 CM/SEC
BH CV ECHO MEAS - LV V1 MEAN: 57.1 CM/SEC
BH CV ECHO MEAS - LV V1 VTI: 22 CM
BH CV ECHO MEAS - LVIDD: 4.6 CM
BH CV ECHO MEAS - LVIDS: 3.4 CM
BH CV ECHO MEAS - LVLD AP2: 8.1 CM
BH CV ECHO MEAS - LVLD AP4: 7.9 CM
BH CV ECHO MEAS - LVLS AP2: 6.8 CM
BH CV ECHO MEAS - LVLS AP4: 7 CM
BH CV ECHO MEAS - LVOT AREA (M): 3.8 CM^2
BH CV ECHO MEAS - LVOT AREA: 3.8 CM^2
BH CV ECHO MEAS - LVOT DIAM: 2.2 CM
BH CV ECHO MEAS - LVPWD: 0.8 CM
BH CV ECHO MEAS - MED PEAK E' VEL: 5 CM/SEC
BH CV ECHO MEAS - MV A DUR: 0.16 SEC
BH CV ECHO MEAS - MV A MAX VEL: 107 CM/SEC
BH CV ECHO MEAS - MV DEC SLOPE: 272 CM/SEC^2
BH CV ECHO MEAS - MV DEC TIME: 197 SEC
BH CV ECHO MEAS - MV E MAX VEL: 58.7 CM/SEC
BH CV ECHO MEAS - MV E/A: 0.55
BH CV ECHO MEAS - MV MEAN PG: 2 MMHG
BH CV ECHO MEAS - MV P1/2T MAX VEL: 77.2 CM/SEC
BH CV ECHO MEAS - MV P1/2T: 83.1 MSEC
BH CV ECHO MEAS - MV V2 MEAN: 60 CM/SEC
BH CV ECHO MEAS - MV V2 VTI: 31.6 CM
BH CV ECHO MEAS - MVA P1/2T LCG: 2.8 CM^2
BH CV ECHO MEAS - MVA(P1/2T): 2.6 CM^2
BH CV ECHO MEAS - MVA(VTI): 2.3 CM^2
BH CV ECHO MEAS - PA ACC SLOPE: 15.4 CM/SEC^2
BH CV ECHO MEAS - PA ACC TIME: 0.09 SEC
BH CV ECHO MEAS - PA MAX PG (FULL): 0.89 MMHG
BH CV ECHO MEAS - PA MAX PG: 3.9 MMHG
BH CV ECHO MEAS - PA PR(ACCEL): 39.4 MMHG
BH CV ECHO MEAS - PA V2 MAX: 99.1 CM/SEC
BH CV ECHO MEAS - PVA(V,A): 3.3 CM^2
BH CV ECHO MEAS - PVA(V,D): 3.3 CM^2
BH CV ECHO MEAS - QP/QS: 0.93
BH CV ECHO MEAS - RAP SYSTOLE: 8 MMHG
BH CV ECHO MEAS - RV MAX PG: 3 MMHG
BH CV ECHO MEAS - RV MEAN PG: 1 MMHG
BH CV ECHO MEAS - RV V1 MAX: 87.2 CM/SEC
BH CV ECHO MEAS - RV V1 MEAN: 55.6 CM/SEC
BH CV ECHO MEAS - RV V1 VTI: 18 CM
BH CV ECHO MEAS - RVOT AREA: 3.8 CM^2
BH CV ECHO MEAS - RVOT DIAM: 2.2 CM
BH CV ECHO MEAS - RVSP: 32 MMHG
BH CV ECHO MEAS - SI(CUBED): 30.7 ML/M^2
BH CV ECHO MEAS - SI(LVOT): 39 ML/M^2
BH CV ECHO MEAS - SI(MOD-SP2): 16.4 ML/M^2
BH CV ECHO MEAS - SI(MOD-SP4): 20.6 ML/M^2
BH CV ECHO MEAS - SI(TEICH): 26.4 ML/M^2
BH CV ECHO MEAS - SV(CUBED): 58 ML
BH CV ECHO MEAS - SV(LVOT): 73.7 ML
BH CV ECHO MEAS - SV(MOD-SP2): 31 ML
BH CV ECHO MEAS - SV(MOD-SP4): 39 ML
BH CV ECHO MEAS - SV(RVOT): 68.4 ML
BH CV ECHO MEAS - SV(TEICH): 49.9 ML
BH CV ECHO MEAS - TAPSE (>1.6): 1.9 CM2
BH CV ECHO MEAS - TR MAX VEL: 244 CM/SEC
BH CV ECHO MEASUREMENTS AVERAGE E/E' RATIO: 9.03
BH CV XLRA - RV BASE: 4.1 CM
BH CV XLRA - TDI S': 17 CM/SEC
BUN BLD-MCNC: 20 MG/DL (ref 8–23)
BUN/CREAT SERPL: 17.2 (ref 7–25)
CALCIUM SPEC-SCNC: 8.2 MG/DL (ref 8.6–10.5)
CHLORIDE SERPL-SCNC: 105 MMOL/L (ref 98–107)
CHOLEST SERPL-MCNC: 122 MG/DL (ref 0–200)
CO2 SERPL-SCNC: 22.8 MMOL/L (ref 22–29)
CREAT BLD-MCNC: 1.16 MG/DL (ref 0.76–1.27)
DEPRECATED RDW RBC AUTO: 48.5 FL (ref 37–54)
EOSINOPHIL # BLD AUTO: 0.3 10*3/MM3 (ref 0–0.4)
EOSINOPHIL NFR BLD AUTO: 4.3 % (ref 0.3–6.2)
ERYTHROCYTE [DISTWIDTH] IN BLOOD BY AUTOMATED COUNT: 13.2 % (ref 12.3–15.4)
GFR SERPL CREATININE-BSD FRML MDRD: 60 ML/MIN/1.73
GLUCOSE BLD-MCNC: 99 MG/DL (ref 65–99)
GLUCOSE BLDC GLUCOMTR-MCNC: 106 MG/DL (ref 70–130)
GLUCOSE BLDC GLUCOMTR-MCNC: 130 MG/DL (ref 70–130)
GLUCOSE BLDC GLUCOMTR-MCNC: 144 MG/DL (ref 70–130)
HBA1C MFR BLD: 5.68 % (ref 4.8–5.6)
HCT VFR BLD AUTO: 35.3 % (ref 37.5–51)
HDLC SERPL-MCNC: 48 MG/DL (ref 40–60)
HGB BLD-MCNC: 11.8 G/DL (ref 13–17.7)
IMM GRANULOCYTES # BLD AUTO: 0.04 10*3/MM3 (ref 0–0.05)
IMM GRANULOCYTES NFR BLD AUTO: 0.6 % (ref 0–0.5)
LDLC SERPL CALC-MCNC: 60 MG/DL (ref 0–100)
LDLC/HDLC SERPL: 1.25 {RATIO}
LEFT ATRIUM VOLUME INDEX: 12 ML/M2
LEFT ATRIUM VOLUME: 26 CM3
LV EF 2D ECHO EST: 55 %
LYMPHOCYTES # BLD AUTO: 1.62 10*3/MM3 (ref 0.7–3.1)
LYMPHOCYTES NFR BLD AUTO: 23.3 % (ref 19.6–45.3)
MAXIMAL PREDICTED HEART RATE: 135 BPM
MCH RBC QN AUTO: 33.8 PG (ref 26.6–33)
MCHC RBC AUTO-ENTMCNC: 33.4 G/DL (ref 31.5–35.7)
MCV RBC AUTO: 101.1 FL (ref 79–97)
MONOCYTES # BLD AUTO: 0.81 10*3/MM3 (ref 0.1–0.9)
MONOCYTES NFR BLD AUTO: 11.6 % (ref 5–12)
NEUTROPHILS # BLD AUTO: 4.11 10*3/MM3 (ref 1.7–7)
NEUTROPHILS NFR BLD AUTO: 59.1 % (ref 42.7–76)
NRBC BLD AUTO-RTO: 0 /100 WBC (ref 0–0.2)
PHOSPHATE SERPL-MCNC: 2.6 MG/DL (ref 2.5–4.5)
PLATELET # BLD AUTO: 142 10*3/MM3 (ref 140–450)
PMV BLD AUTO: 10 FL (ref 6–12)
POTASSIUM BLD-SCNC: 3.7 MMOL/L (ref 3.5–5.2)
RBC # BLD AUTO: 3.49 10*6/MM3 (ref 4.14–5.8)
SODIUM BLD-SCNC: 139 MMOL/L (ref 136–145)
STRESS TARGET HR: 115 BPM
TRIGL SERPL-MCNC: 69 MG/DL (ref 0–150)
VLDLC SERPL-MCNC: 13.8 MG/DL (ref 5–40)
WBC NRBC COR # BLD: 6.96 10*3/MM3 (ref 3.4–10.8)

## 2019-09-16 PROCEDURE — 25010000002 ONDANSETRON PER 1 MG: Performed by: INTERNAL MEDICINE

## 2019-09-16 PROCEDURE — 99233 SBSQ HOSP IP/OBS HIGH 50: CPT | Performed by: PSYCHIATRY & NEUROLOGY

## 2019-09-16 PROCEDURE — 97110 THERAPEUTIC EXERCISES: CPT

## 2019-09-16 PROCEDURE — 92610 EVALUATE SWALLOWING FUNCTION: CPT | Performed by: SPEECH-LANGUAGE PATHOLOGIST

## 2019-09-16 PROCEDURE — 97165 OT EVAL LOW COMPLEX 30 MIN: CPT

## 2019-09-16 PROCEDURE — 70450 CT HEAD/BRAIN W/O DYE: CPT

## 2019-09-16 PROCEDURE — 80069 RENAL FUNCTION PANEL: CPT | Performed by: INTERNAL MEDICINE

## 2019-09-16 PROCEDURE — 83036 HEMOGLOBIN GLYCOSYLATED A1C: CPT | Performed by: PSYCHIATRY & NEUROLOGY

## 2019-09-16 PROCEDURE — 85025 COMPLETE CBC W/AUTO DIFF WBC: CPT | Performed by: INTERNAL MEDICINE

## 2019-09-16 PROCEDURE — 82962 GLUCOSE BLOOD TEST: CPT

## 2019-09-16 PROCEDURE — 99221 1ST HOSP IP/OBS SF/LOW 40: CPT | Performed by: INTERNAL MEDICINE

## 2019-09-16 PROCEDURE — 97161 PT EVAL LOW COMPLEX 20 MIN: CPT

## 2019-09-16 PROCEDURE — 80061 LIPID PANEL: CPT | Performed by: PSYCHIATRY & NEUROLOGY

## 2019-09-16 RX ORDER — FAMOTIDINE 20 MG/1
20 TABLET, FILM COATED ORAL
Status: DISCONTINUED | OUTPATIENT
Start: 2019-09-16 | End: 2019-09-20 | Stop reason: HOSPADM

## 2019-09-16 RX ORDER — ONDANSETRON 2 MG/ML
4 INJECTION INTRAMUSCULAR; INTRAVENOUS EVERY 6 HOURS PRN
Status: DISCONTINUED | OUTPATIENT
Start: 2019-09-16 | End: 2019-09-20 | Stop reason: HOSPADM

## 2019-09-16 RX ADMIN — ATORVASTATIN CALCIUM 80 MG: 80 TABLET, FILM COATED ORAL at 20:50

## 2019-09-16 RX ADMIN — SODIUM CHLORIDE, PRESERVATIVE FREE 10 ML: 5 INJECTION INTRAVENOUS at 20:51

## 2019-09-16 RX ADMIN — ACETYLCYSTEINE 600 MG: 200 SOLUTION ORAL; RESPIRATORY (INHALATION) at 20:51

## 2019-09-16 RX ADMIN — FAMOTIDINE 20 MG: 20 TABLET, FILM COATED ORAL at 15:21

## 2019-09-16 RX ADMIN — ACETYLCYSTEINE 600 MG: 200 SOLUTION ORAL; RESPIRATORY (INHALATION) at 13:21

## 2019-09-16 RX ADMIN — SODIUM CHLORIDE, PRESERVATIVE FREE 10 ML: 5 INJECTION INTRAVENOUS at 09:21

## 2019-09-16 RX ADMIN — ASPIRIN 325 MG: 325 TABLET ORAL at 12:28

## 2019-09-16 RX ADMIN — PHENYTOIN SODIUM 200 MG: 100 CAPSULE, EXTENDED RELEASE ORAL at 09:15

## 2019-09-16 RX ADMIN — ONDANSETRON 4 MG: 2 INJECTION INTRAMUSCULAR; INTRAVENOUS at 23:06

## 2019-09-16 NOTE — NURSING NOTE
Acute rehab referral received via stroke order set. OT noted to have signed off. Patient not appropriate for acute rehab. Will sign off.

## 2019-09-16 NOTE — THERAPY EVALUATION
Acute Care - Speech Language Pathology   Swallow Initial Evaluation [facility]     Patient Name: Neptali Vera  : 1933  MRN: [id number]  Today's Date: 2019               Admit Date: 9/15/2019    Visit Dx:   No diagnosis found.  Patient Active Problem List   Diagnosis   • Ischemic stroke (CMS/HCC)     Past Medical History:   Diagnosis Date   • Hypertension    • Seizure (CMS/HCC)      No past surgical history on file.     SWALLOW EVALUATION (last 72 hours)      SLP Adult Swallow Evaluation     Row Name 19 0930                   Rehab Evaluation    Document Type  evaluation  -SA        Subjective Information  no complaints  -SA        Patient Observations  alert;cooperative  -SA        Patient Effort  good  -SA        Symptoms Noted During/After Treatment  none  -SA           General Information    Patient Profile Reviewed  yes  -SA        Pertinent History Of Current Problem  s/p fall w/ suspected R MCA, CT negative  -SA        Current Method of Nutrition  NPO  -SA        Precautions/Limitations, Vision  WFL;for purposes of eval  -SA        Precautions/Limitations, Hearing  WFL;for purposes of eval  -SA        Prior Level of Function-Communication  WFL  -SA        Prior Level of Function-Swallowing  no diet consistency restrictions  -SA        Plans/Goals Discussed with  patient  -SA        Barriers to Rehab  none identified  -SA        Patient's Goals for Discharge  return to regular diet  -SA           Oral Motor and Function    Dentition Assessment  edentulous, dentures not available;other (see comments) pt rpts eating soft foods w/o dentures  -SA        Secretion Management  WNL/WFL  -SA           Clinical Swallow Eval    Clinical Swallow Evaluation Summary  Swallow eval completed.  Pt with no overt s/s aspiration with thin, puree, soft, or mixed consistencies.  Able to adequately masticate soft solids w/o dentures.  REC mech soft diet w/ thin liquids, meds as tolerated.  Upright with alll po   -SA           Clinical Impression    SLP Swallowing Diagnosis  functional pharyngeal phase  -SA        Functional Impact  risk of aspiration/pneumonia  -SA        Rehab Potential/Prognosis, Swallowing  good, to achieve stated therapy goals  -        Swallow Criteria for Skilled Therapeutic Interventions Met  demonstrates skilled criteria  -           Recommendations    Therapy Frequency (Swallow)  PRN  -        Predicted Duration Therapy Intervention (Days)  until discharge  -        SLP Diet Recommendation  soft textures;ground;thin liquids  -        Recommended Diagnostics  reassess via clinical swallow evaluation  -        Recommended Precautions and Strategies  upright posture during/after eating;small bites of food and sips of liquid  -SA        SLP Rec. for Method of Medication Administration  as tolerated  -        Monitor for Signs of Aspiration  notify SLP if any concerns  -           Swallow Goals (SLP)    Oral Nutrition/Hydration Goal Selection (SLP)  oral nutrition/hydration, SLP goal 1  -SA           Oral Nutrition/Hydration Goal 1 (SLP)    Oral Nutrition/Hydration Goal 1, SLP  Pt will tolerate least restrictive diet  -        Time Frame (Oral Nutrition/Hydration Goal 1, SLP)  by discharge  -          User Key  (r) = Recorded By, (t) = Taken By, (c) = Cosigned By    Initials Name Effective Dates    Jazmyn Cummings MS [facility]-SLP 03/07/18 -           EDUCATION  The patient has been educated in the following areas:   Dysphagia (Swallowing Impairment) Modified Diet Instruction.    SLP Recommendation and Plan  SLP Swallowing Diagnosis: functional pharyngeal phase  SLP Diet Recommendation: soft textures, ground, thin liquids  Recommended Precautions and Strategies: upright posture during/after eating, small bites of food and sips of liquid  SLP Rec. for Method of Medication Administration: as tolerated     Monitor for Signs of Aspiration: notify SLP if any concerns  Recommended  Diagnostics: reassess via clinical swallow evaluation  Swallow Criteria for Skilled Therapeutic Interventions Met: demonstrates skilled criteria     Rehab Potential/Prognosis, Swallowing: good, to achieve stated therapy goals  Therapy Frequency (Swallow): PRN  Predicted Duration Therapy Intervention (Days): until discharge       Plan of Care Reviewed With: patient  Outcome Summary: Swallow eval completed.  Pt with no overt s/s aspiration with thin, puree, soft, or mixed consistencies.  Able to adequately masticate soft solids w/o dentures.  REC mech soft diet w/ thin liquids, meds as tolerated.  Upright with alll po    SLP GOALS     Row Name 09/16/19 0930             Oral Nutrition/Hydration Goal 1 (SLP)    Oral Nutrition/Hydration Goal 1, SLP  Pt will tolerate least restrictive diet  -      Time Frame (Oral Nutrition/Hydration Goal 1, SLP)  by discharge  -        User Key  (r) = Recorded By, (t) = Taken By, (c) = Cosigned By    Initials Name Provider Type    Jazmyn Cummings MS CCC-SLP Speech and Language Pathologist           SLP Outcome Measures (last 72 hours)      SLP Outcome Measures     Row Name 09/16/19 0930             SLP Outcome Measures    Outcome Measure Used?  Adult NOMS  -         Adult FCM Scores    FCM Chosen  Swallowing  -      Swallowing FCM Score  4  -        User Key  (r) = Recorded By, (t) = Taken By, (c) = Cosigned By    Initials Name Effective Dates    Jazmyn Cummings MS CCC-SLP 03/07/18 -            Time Calculation:   Time Calculation- SLP     Row Name 09/16/19 1633             Time Calculation- SLP    SLP Start Time  0930  -      SLP Received On  09/16/19  -        User Key  (r) = Recorded By, (t) = Taken By, (c) = Cosigned By    Initials Name Provider Type    Jazmyn Cummings MS CCC-SLP Speech and Language Pathologist          Therapy Charges for Today     Code Description Service Date Service Provider Modifiers Qty    [id number] HC ST EVAL ORAL PHARYNG SWALLOW 4  9/16/2019 Jazmyn Mohan, MS CCC-SLP GN 1               Jazmyn Mohan MS CCC-SLP  9/16/2019

## 2019-09-16 NOTE — PLAN OF CARE
Problem: Patient Care Overview  Goal: Plan of Care Review  Outcome: Ongoing (interventions implemented as appropriate)   09/16/19 1025   Coping/Psychosocial   Plan of Care Reviewed With patient   OTHER   Outcome Summary Swallow eval completed. Pt demonstrated no overt s/saspiration with thin,puree,soft solids, or mixed consistencies. Dentures not available; however,able to chew soft solids adequately. REC mech soft diet with thin liquids. Meds was tolerated. Upright during all po and 30 mins after meals.

## 2019-09-16 NOTE — PLAN OF CARE
Problem: Patient Care Overview  Goal: Plan of Care Review  Outcome: Ongoing (interventions implemented as appropriate)   09/16/19 0288   Coping/Psychosocial   Plan of Care Reviewed With patient   OTHER   Outcome Summary pt with good tolerance to activity this morning, able to walk 150 ft in [place] with min assist, he did complain of dizziness with standing and gait was mildly unsteady, pt may need cane or walker for home, will cont to see while in acute care to maximize functional mobility and reduce risk of falls, pt lives in single story home with daughter, ramp to enter

## 2019-09-16 NOTE — CONSULTS
Date of Hospital Visit: 9/15/2019  Date of consult: 2019  Encounter Provider: Beto Dueñas MD  Place of Service: [facility] CARDIOLOGY  Patient Name: Neptali Vera  :1933  Referral Provider: Aniceto Dexter MD    Chief complaint: Neurological Changes    Reason for Consult: Stroke with pacemaker    History of Present Illness: Neptali Vera is an 85-year-old male with a history of sick sinus syndrome, s/p Medtronic dual-chamber PPM placement, and essential hypertension who normally follows with Dr. Barney of the [facility]. His last remote device check was on 2019 and had no episodes of AT/AF. His last office note from 1/10/2019 with TAMMIE Duffy noted he was doing well from a pacemaker standpoint, but she did note patient reported occasional, mild postural dizziness and epistaxis.     He presented to the [facility] ED yesterday morning after an unwitnessed fall in his bathroom around 05:30. Family helped him up off the floor and noticed him to be at baseline neuro status. However, his family began to notice patient was drooling and not following commands soon after. Patient was noted to have left sided weakness as well.  His family reports symptoms been mild.  No exacerbating or relieving factor.  Patient was suspected to have right MCA stroke, per CT results, and was given TPA a 07:36. He was then transferred to [facility] ICU. Upon arrival to the ICU, patient was without weakness and a NIH score of 1. Echo was obtained with final read pending. Call has been placed to [employer] to get pacemaker interrogated.   Patient denied any presyncope, syncope, chest discomfort.  He is fairly active every day and [place] but does not do heavy tasks.  He has to stop after walking about a block because he feels tired and short of breath.  He denied some symptoms of heart failure like orthopnea, PND or extremity swelling.  During  interview this morning he believes he is back to his baseline status.        Past Medical History:   Diagnosis Date   • Hypertension    • Seizure (CMS/HCC)        No past surgical history on file.    Medications Prior to Admission   Medication Sig Dispense Refill Last Dose   • aspirin 81 MG chewable tablet Chew 81 mg Daily.      • phenytoin (DILANTIN) 100 MG ER capsule Take  by mouth.          Current Meds  Scheduled Meds:    acetylcysteine 600 mg Oral BID - RT   aspirin 325 mg Oral Daily   Or      aspirin 300 mg Rectal Daily   atorvastatin 80 mg Oral Nightly   phenytoin 200 mg Oral Q12H   sodium chloride 10 mL Intravenous Q12H     Continuous Infusions:    sodium chloride 100 mL/hr Last Rate: 100 mL/hr (09/15/19 2136)     PRN Meds:.•  acetaminophen  •  sodium chloride    Allergies as of 09/15/2019   • (No Known Allergies)       Social History     Socioeconomic History   • Marital status:      Spouse name: Not on file   • Number of children: Not on file   • Years of education: Not on file   • Highest education level: Not on file   Tobacco Use   • Smoking status: Never Smoker   • Smokeless tobacco: Never Used   Substance and Sexual Activity   • Alcohol use: No     Frequency: Never   • Drug use: No   • Sexual activity: Defer       No family history on file.    Review of systems    All systems reviewed and negative except as noted in HPI.     Objective:   Temp:  [97.4 °F (36.3 °C)-98.7 °F (37.1 °C)] 97.4 °F (36.3 °C)  Heart Rate:  [57-87] 58  Resp:  [16-18] 16  BP: (106-165)/() 148/58  Body mass index is 23.9 kg/m².  Flowsheet Rows      First Filed Value   Admission Height  --   Admission Weight  73.5 kg (162 lb 0.6 oz) Documented at 09/15/2019 1015        Vitals:    09/16/19 0905   BP: 148/58   Pulse: 58   Resp:    Temp:    SpO2: 96%       General Appearance:    Alert elderly, cooperative, in no acute distress, lying in bed   Head:    Normocephalic, without obvious abnormality, atraumatic   Eyes:             Lids and lashes normal, conjunctivae and sclerae normal, no   icterus, no pallor, corneas clear, PERRLA   Ears:    Ears appear intact with no abnormalities noted   Throat:   No oral lesions, no thrush, oral mucosa moist   Neck:   No adenopathy, supple, trachea midline, no thyromegaly, no   carotid bruit, no JVD   Back:     No kyphosis present, no scoliosis present, no skin lesions, erythema or scars, no tenderness to percussion or palpation, range of motion normal   Lungs:     Clear to auscultation,respirations regular, even and unlabored    Heart:    Regular rhythm and normal rate, normal S1 and S2, no murmur, no gallop, no rub, no click   Chest Wall:    No abnormalities observed   Abdomen:     Normal bowel sounds, no masses, no organomegaly, soft        non-tender, non-distended, no guarding, no rebound  tenderness   Extremities:   Moves all extremities well, no edema, no cyanosis, no redness   Pulses:   Pulses palpable and equal bilaterally. Normal radial, carotid, femoral, dorsalis pedis and posterior tibial pulses bilaterally. Normal abdominal aorta   Skin:  Neurology:   Psychiatric:   No bleeding, bruising or rash   Normal speech and cranial nerve exam, no focal deficit   Alert and oriented x 3, normal mood and affect             Review of Data:      Results from last 7 days   Lab Units 09/16/19  0529 09/15/19  0656   SODIUM mmol/L 139 139   POTASSIUM mmol/L 3.7 3.9   CHLORIDE mmol/L 105 107   CO2 mmol/L 22.8 25.0   BUN mg/dL 20 20   CREATININE mg/dL 1.16 1.40*   CALCIUM mg/dL 8.2* 8.9   BILIRUBIN mg/dL  --  0.2*   ALK PHOS U/L  --  116*   ALT (SGPT) U/L  --  17   AST (SGOT) U/L  --  24   GLUCOSE mg/dL 99 109*     Results from last 7 days   Lab Units 09/15/19  0656   TROPONIN I ng/mL <0.030     @LABRCNTbnp@  Results from last 7 days   Lab Units 09/16/19  0529 09/15/19  0656   WBC 10*3/mm3 6.96 5.70   HEMOGLOBIN g/dL 11.8* 14.0   HEMATOCRIT % 35.3* 41.0   PLATELETS 10*3/mm3 142 160     Results from last 7 days    Lab Units 09/15/19  0656   INR  1.05   APTT seconds 25.6         @LABRCNTIP(chol,trig,hdl,ldl)    CT HEAD/NECK 9/15/2019  IMPRESSION:  1. No perfusion abnormality to suggest completed or threatened acute  infarct.  2. 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  Scattered areas of arterial narrowing, as detailed above.   3. No acute intracranial hemorrhage or hydrocephalus identified, limited  by pre-existing intravascular contrast material on the unenhanced  portion of the exam. No enhancing lesions of brain. Hypodensity in the  bilateral occipital lobes suggesting chronic to subacute areas infarct.  4. Thyroid nodularity.     CT HEAD Follow Up 9/16/2019  FINDINGS:  There is diffuse atrophy. There is periventricular deep white matter  microangiopathic change. There is no acute intracranial hemorrhage.  There is no midline shift or mass effect. No new areas of decreased  attenuation are identified. The paranasal sinuses and mastoid air cells  are clear.  IMPRESSION:  No acute intracranial process is identified.      XRAY CHEST 9/15/2019  Findings:  There is a left-sided chest wall pacemaker with leads projecting over  the right atrium and right ventricle. There is mild cardiomegaly. There  are coarsened interstitial markings likely related to underlying chronic  lung disease or less likely interstitial edema. The lung bases appear  clear. There is no pleural effusion or pneumothorax. Multilevel  degenerative changes of the thoracic spine.  IMPRESSION:  Mild cardiomegaly with coarsened interstitial markings likely related to  underlying chronic lung disease with interstitial edema felt to be less  likely.    EKG      I personally viewed and interpreted the patient's EKG/Telemetry tracing data      )  Patient Active Problem List   Diagnosis   • Ischemic stroke (CMS/[facility])     Assessment and Plan:  Mr. Vera is an 85 years old man with past medical history of sick sinus syndrome status post  placement of dual-chamber [employer]tronic permanent pacemaker.  He was admitted with right MCA stroke status post TPA last night    1.  Sick sinus syndrome status post dual-chamber permanent pacemaker placement.  The pacemaker was interrogated today and operating well with no significant episodes of bradycardia, or arrhythmias.  It has 2 additional years on the battery  -His symptoms probably are not related to underlying arrhythmia or pacemaker malfunction.    2.  Hypertension  -Blood pressure below 180 110.  No need for antihypertensive if blood pressure remains within this range in the acute post CVA      3.  Right MCA CVA  -No residual deficit on exam  -Continue aspirin, high intensity statin when okay with neurology        Beto Dueñas MD  09/16/19  9:32 AM.  Time spent in reviewing chart, discussion and examination:

## 2019-09-16 NOTE — PLAN OF CARE
Problem: Patient Care Overview  Goal: Plan of Care Review  Outcome: Ongoing (interventions implemented as appropriate)   09/16/19 0481   Coping/Psychosocial   Plan of Care Reviewed With patient   OTHER   Outcome Summary Swallow eval completed. Pt with no overt s/s aspiration with thin, puree, soft, or mixed consistencies. Able to adequately masticate soft solids w/o dentures. REC mech soft diet w/ thin liquids, meds as tolerated. Upright with alll po

## 2019-09-16 NOTE — PLAN OF CARE
Problem: Skin Injury Risk (Adult)  Goal: Identify Related Risk Factors and Signs and Symptoms  Outcome: Ongoing (interventions implemented as appropriate)    Goal: Skin Health and Integrity  Outcome: Ongoing (interventions implemented as appropriate)      Problem: Fall Risk (Adult)  Goal: Identify Related Risk Factors and Signs and Symptoms  Outcome: Ongoing (interventions implemented as appropriate)    Goal: Absence of Fall  Outcome: Ongoing (interventions implemented as appropriate)      Problem: Patient Care Overview  Goal: Plan of Care Review  Outcome: Ongoing (interventions implemented as appropriate)    Goal: Individualization and Mutuality  Outcome: Ongoing (interventions implemented as appropriate)    Goal: Discharge Needs Assessment  Outcome: Ongoing (interventions implemented as appropriate)    Goal: Interprofessional Rounds/Family Conf  Outcome: Ongoing (interventions implemented as appropriate)

## 2019-09-16 NOTE — PLAN OF CARE
Problem: Patient Care Overview  Goal: Plan of Care Review   09/16/19 1407   OTHER   Outcome Summary OT: Pt. has all isolated movments and stregth with UEs to (A) with BADLs, Pt. was (I) with LE dressing. OT educated the pt. and daughter on AE to use at home to increase safety with showering, Pt. currently has no skilled need for OT.

## 2019-09-16 NOTE — PLAN OF CARE
Problem: Patient Care Overview  Goal: Plan of Care Review  Outcome: Ongoing (interventions implemented as appropriate)   09/16/19 2204   Coping/Psychosocial   Plan of Care Reviewed With patient   Plan of Care Review   Progress improving   OTHER   Outcome Summary No significant changes overnight, facial droop seems to have almost completely resolved, still has some slight slurred speech although is easily understandable in conversation. Continues to void frequently, without difficulty. IV fluids infusing. AM labs drawn and CT scan completed, awaiting results. Vitals stable throughout the shift. Will continue to monitor. DARON Beatty RN

## 2019-09-16 NOTE — THERAPY EVALUATION
Patient Name: Neptali Vera  : 1933    MRN: [id number]                              Today's Date: 2019       Admit Date: 9/15/2019    Visit Dx: No diagnosis found.  Patient Active Problem List   Diagnosis   • Ischemic stroke (CMS/HCC)     Past Medical History:   Diagnosis Date   • Hypertension    • Seizure (CMS/HCC)      No past surgical history on file.  General Information     Row Name 19 1100          PT Evaluation Time/Intention    Document Type  evaluation  -PC     Mode of Treatment  physical therapy  -PC     Row Name 19 1100          General Information    Patient Profile Reviewed?  yes  -PC     Prior Level of Function  independent:  -PC     Existing Precautions/Restrictions  fall  -PC     Row Name 19 1100          Relationship/Environment    Lives With  child(mk), adult  -PC     Row Name 19 1100          Resource/Environmental Concerns    Current Living Arrangements  home/apartment/condo  -PC     Row Name 19 1100          Home Main Entrance    Number of Stairs, Main Entrance  -- has ramp available  -PC     Row Name 19 1100          Cognitive Assessment/Intervention- PT/OT    Orientation Status (Cognition)  oriented x 3  -PC     Row Name 19 1100          Safety Issues, Functional Mobility    Safety Issues Affecting Function (Mobility)  insight into deficits/self awareness  -PC       User Key  (r) = Recorded By, (t) = Taken By, (c) = Cosigned By    Initials Name Provider Type    PC Annalee Reynolds, PT Physical Therapist        Mobility     Row Name 19 1103          Bed Mobility Assessment/Treatment    Bed Mobility Assessment/Treatment  supine-sit;sit-supine  -PC     Supine-Sit [place] (Bed Mobility)  contact guard  -PC     Row Name 19 1103          Sit-Stand Transfer    Sit-Stand [place] (Transfers)  minimum assist (75% patient effort)  -PC     Row Name 19 1103          Gait/Stairs Assessment/Training    [place] Level (Gait)   minimum assist (75% patient effort);1 person assist;1 person to manage equipment  -PC     Distance in Feet (Gait)  150 ft  -PC     Pattern (Gait)  step-through  -PC     Deviations/Abnormal Patterns (Gait)  festinating/shuffling;rivka decreased;stride length decreased  -PC     Bilateral Gait Deviations  heel strike decreased  -PC     Comment (Gait/Stairs)  pt c/o dizziness   -PC       User Key  (r) = Recorded By, (t) = Taken By, (c) = Cosigned By    Initials Name Provider Type    PC Annalee Reynolds, PT Physical Therapist        Obj/Interventions     Row Name 09/16/19 1105          General ROM    GENERAL ROM COMMENTS  WFL  -PC     Row Name 09/16/19 1105          MMT (Manual Muscle Testing)    General MMT Comments  WNL, normal tone, normal coordination with finger to nose, KETURAH  -PC     Row Name 09/16/19 1105          Static Sitting Balance    Level of [place] (Unsupported Sitting, Static Balance)  independent  -PC     Row Name 09/16/19 1105          Dynamic Sitting Balance    Level of [place], Reaches Outside Midline (Sitting, Dynamic Balance)  conditional independence  -PC     Row Name 09/16/19 1105          Static Standing Balance    Level of [place] (Supported Standing, Static Balance)  contact guard assist  -PC     Row Name 09/16/19 1105          Dynamic Standing Balance    Level of [place], Reaches Outside Midline (Standing, Dynamic Balance)  minimal assist, 75% patient effort  -PC     Assistive Device Utilized (Supported Standing, Dynamic Balance)  -- HHA  -PC       User Key  (r) = Recorded By, (t) = Taken By, (c) = Cosigned By    Initials Name Provider Type    PC Annalee Reynolds PT Physical Therapist        Goals/Plan     Row Name 09/16/19 1107          Bed Mobility Goal 1 (PT)    Activity/Assistive Device (Bed Mobility Goal 1, PT)  sit to supine;supine to sit  -PC     [place] Level/Cues Needed (Bed Mobility Goal 1, PT)  supervision required  -PC     Time Frame (Bed Mobility Goal 1,  PT)  1 week  -PC     Row Name 09/16/19 1107          Transfer Goal 1 (PT)    Activity/Assistive Device (Transfer Goal 1, PT)  sit-to-stand/stand-to-sit  -PC     [place] Level/Cues Needed (Transfer Goal 1, PT)  supervision required  -PC     Time Frame (Transfer Goal 1, PT)  1 week  -PC     Row Name 09/16/19 1107          Gait Training Goal 1 (PT)    Activity/Assistive Device (Gait Training Goal 1, PT)  gait (walking locomotion);assistive device use;decrease fall risk;improve balance and speed  -PC     [place] Level (Gait Training Goal 1, PT)  supervision required  -PC     Distance (Gait Goal 1, PT)  300 ft  -PC     Time Frame (Gait Training Goal 1, PT)  1 week  -PC       User Key  (r) = Recorded By, (t) = Taken By, (c) = Cosigned By    Initials Name Provider Type    PC Annalee Reynolds, PT Physical Therapist        Clinical Impression     [facility] Name 09/16/19 1107          Pain Assessment    Additional Documentation  Pain Scale: Numbers Pre/Post-Treatment (Group)  -PC     Row Name 09/16/19 1107          Pain Scale: Numbers Pre/Post-Treatment    Pain Scale: Numbers, Pretreatment  0/10 - no pain  -PC     Pain Scale: Numbers, Post-Treatment  0/10 - no pain  -PC     Row Name 09/16/19 1107          Plan of Care Review    Plan of Care Reviewed With  patient  -PC     [facility] Name 09/16/19 1107          Physical Therapy Clinical Impression    Criteria for Skilled Interventions Met (PT Clinical Impression)  yes;treatment indicated  -PC     Rehab Potential (PT Clinical Summary)  good, to achieve stated therapy goals  -PC     [facility] Name 09/16/19 1107          Positioning and Restraints    Pre-Treatment Position  in bed  -PC     Post Treatment Position  chair  -PC     In Chair  sitting;call light within reach;encouraged to call for assist  -PC       User Key  (r) = Recorded By, (t) = Taken By, (c) = Cosigned By    Initials Name Provider Type    PC Annalee Reynolds, PT Physical Therapist        Outcome Measures     Row Name  09/16/19 1111          How much help from another person do you currently need...    Turning from your back to your side while in flat bed without using bedrails?  4  -PC     Moving from lying on back to sitting on the side of a flat bed without bedrails?  4  -PC     Moving to and from a bed to a chair (including a wheelchair)?  3  -PC     Standing up from a chair using your arms (e.g., wheelchair, bedside chair)?  3  -PC     Climbing 3-5 steps with a railing?  3  -PC     To walk in hospital room?  3  -PC     AM-PAC 6 Clicks Score (PT)  20  -PC     Row Name 09/16/19 1111          Modified [place] Scale    Pre-Stroke Modified [place] Scale  3 - Moderate disability.  Requiring some help, but able to walk without assistance.  -PC     Row Name 09/16/19 1111          Functional Assessment    Outcome Measure Options  AM-PAC 6 Clicks Basic Mobility (PT);Modified [place]  -PC       User Key  (r) = Recorded By, (t) = Taken By, (c) = Cosigned By    Initials Name Provider Type    PC Annalee Reynolds, PT Physical Therapist        Physical Therapy Education     Title: PT OT SLP Therapies (Done)     Topic: Physical Therapy (Done)     Point: Mobility training (Done)     Learning Progress Summary           Patient Acceptance, E,D, DU by  at 9/16/2019 11:09 AM                   Point: Home exercise program (Done)     Learning Progress Summary           Patient Acceptance, E,D, DU by  at 9/16/2019 11:09 AM                   Point: Body mechanics (Done)     Learning Progress Summary           Patient Acceptance, E,D, DU by  at 9/16/2019 11:09 AM                   Point: Precautions (Done)     Learning Progress Summary           Patient Acceptance, E,D, DU by  at 9/16/2019 11:09 AM                               User Key     Initials Effective Dates Name Provider Type Discipline     04/03/18 -  Annalee Reynolds PT Physical Therapist PT              PT Recommendation and Plan     Outcome Summary/Treatment Plan (PT)  Anticipated  Discharge Disposition (PT): home with assist, home with home health  Plan of Care Reviewed With: patient  Outcome Summary: pt with good tolerance to activity this morning, able to walk 150 ft in [place] with min assist, he did complain of dizziness with standing and gait was mildly unsteady, pt may need cane or walker for home, will cont to see while in acute care to maximize functional mobility and reduce risk of falls     Time Calculation:   PT Charges     Row Name 09/16/19 1112             Time Calculation    Start Time  1040  -PC      Stop Time  1058  -PC      Time Calculation (min)  18 min  -PC      PT Received On  09/16/19  -PC      PT - Next Appointment  09/17/19  -PC      PT Goal Re-Cert Due Date  09/23/19  -PC        User Key  (r) = Recorded By, (t) = Taken By, (c) = Cosigned By    Initials Name Provider Type    PC Annalee Reynolds, PT Physical Therapist        Therapy Charges for Today     Code Description Service Date Service Provider Modifiers Qty    28903142980 HC PT EVAL LOW COMPLEXITY 2 9/16/2019 Annalee Reynolds, PT GP 1    49578608715 HC PT THER PROC EA 15 MIN 9/16/2019 Annalee Reynolds, PT GP 1    75758506102 HC PT THER SUPP EA 15 MIN 9/16/2019 Annalee Reynolds, PT GP 1          PT G-Codes  Outcome Measure Options: AM-PAC 6 Clicks Basic Mobility (PT), Modified [place]  AM-PAC 6 Clicks Score (PT): 20    Annalee Reynolds PT  9/16/2019

## 2019-09-16 NOTE — PROGRESS NOTES
NEUROLOGY PROGRESS NOTE - STROKE TEAM    DOS: 2019  NAME: Neptali Vera   : 1933  PCP: Provider, No Known  CC: Stroke  Referring MD: Aniceto Dexter MD  Patient being seen at request of Dr. Dexter for advice and opinion on right MCA occlusion/TIA    Neurological Problem and Interval History:  85 y.o. male presented with an acute right MCA occlusion documented by CTA, received tPA and then on follow-up a few hours later had resolved the thrombus. Patient has a history of sick sinus syndrome with a pacemaker. Follow-up CT is stable without hemorrhage and patient feels a little lightheaded but much improved.      Past Medical/Surgical Hx:  Past Medical History:   Diagnosis Date   • Hypertension    • Seizure (CMS/HCC)      No past surgical history on file.    Medications On Admission  Medications Prior to Admission   Medication Sig Dispense Refill Last Dose   • aspirin 81 MG chewable tablet Chew 81 mg Daily.      • phenytoin (DILANTIN) 100 MG ER capsule Take  by mouth.          Allergies:  No Known Allergies    Social Hx:  Social History     Socioeconomic History   • Marital status:      Spouse name: Not on file   • Number of children: Not on file   • Years of education: Not on file   • Highest education level: Not on file   Tobacco Use   • Smoking status: Never Smoker   • Smokeless tobacco: Never Used   Substance and Sexual Activity   • Alcohol use: No     Frequency: Never   • Drug use: No   • Sexual activity: Defer       Family Hx:  History reviewed. No pertinent family history.    Review of Imaging (Interpretation of images not reports):      Laboratory Results:   Lab Results   Component Value Date    GLUCOSE 99 2019    CALCIUM 8.2 (L) 2019     2019    K 3.7 2019    CO2 22.8 2019     2019    BUN 20 2019    CREATININE 1.16 2019    EGFRIFNONA 60 (L) 2019    BCR 17.2 2019    ANIONGAP 11.2 2019     Lab Results   Component  Value Date    WBC 6.96 09/16/2019    HGB 11.8 (L) 09/16/2019    HCT 35.3 (L) 09/16/2019    .1 (H) 09/16/2019     09/16/2019     Lab Results   Component Value Date    CHOL 122 09/16/2019     Lab Results   Component Value Date    HDL 48 09/16/2019     Lab Results   Component Value Date    LDL 60 09/16/2019     Lab Results   Component Value Date    TRIG 69 09/16/2019     Lab Results   Component Value Date    HGBA1C 5.68 (H) 09/16/2019     Lab Results   Component Value Date    INR 1.05 09/15/2019    PROTIME 10.7 09/15/2019         Physical Examination:   /73   Pulse 63   Temp 97.4 °F (36.3 °C) (Oral)   Resp 16   Wt 73.4 kg (161 lb 13.1 oz)   SpO2 98%   BMI 23.90 kg/m²   General Appearance:   Well developed, well nourished, well groomed, alert, and cooperative.  HEENT: Normocephalic. Atraumatic. No JVD, no tracheal deviation.  Neck and Spine: Normal range of motion.  Normal alignment. No mass or tenderness. No bruits.  Cardiac: Regular rate and rhythm. No murmurs.  Pulmonary: No shortness of breath, clear anteriorly to auscultation  Abdomen:  Soft, non-tender, non-distended   Peripheral Vasculature: Radial pulses are equal and symmetric. No signs of distal embolization.  Extremities:    No edema or deformities.   Skin:    No rashes or discoloration    Neurological examination:  Higher Integrative  Function: Alert, follows commands, can tell time on clock, normal speech. Positive bilateral palmomentalis, negative grasp, root, snout.  No neglect or extinction.  CN II: Pupils are equal, round, and reactive to light. Blinks to visual threat and counts fingers in all fields  CN III IV VI: Extraocular movements are full without nystagmus.   CN V: Normal facial sensation   CN VII: Facial movements are symmetric.  CN VIII:   Hard of hearing  CN IX & X:   Mild to moderate palatal and lingual dysarthria.   CN XI: Turns head without abnormality.   Motor: Normal muscle strength, bulk and tone in upper and  lower extremities. Able to give full power  Reflexes: 2+ in the upper and lower extremities. Plantar responses are flexor.  Sensation: Normal to light touch, temperature, and proprioception in arms and legs.   Station and Gait: Deferred for bed rest    Coordination: Finger to nose test shows no dysmetria.  Rapid alternating movements are normal.  Heel to shin normal.    NIHSS:     Diagnoses / Discussion:  85 y.o. who presents with Sx of acute right MCA occlusion with successful thrombolysis by tPA and stable head CT. Likely cardioembolic as patient's CTA does not show a high grade proximal vessel occlusion and patient has a history of sick sinus syndrome. Awaiting read of pacemaker for episodes that might have occurred around the time of the stroke. If negative, consider TTE to further evaluate.     Patient reports having nosebleeds on aspirin but was taking 81mg.     2) Seizure disorder? Patient reports that in 2009 when his pacemaker was originally placed, he was told he had a seizure and was started on dilantin at that time.  He reports he's never had a seizure since then but he was told to continue taking the dilantin. It's not entirely clear that patient needs to be on chronic dilantin therapy and at his age, it may lead to complications. His imaging does not show a structural lesion in his brain such as a prior stroke and the reported seizure occurred while he was undergoing a procedure. While he is here in the hospital, it maybe worthwhile stopping his dilantin to see if after it's withdrawn he has a seizure. If not, then can continue to hold as outpatient.    Plan:  Await report of pacemaker  If negative for atrial fib, would load with plavix and use dual antiplatelet for 30 days for very high risk TIA.    Medium dose statin.    Call 911 for stroke any stroke symptoms  Time spent with patient: 35 minutes    Coding     Jordy Solares MD  Neurology

## 2019-09-16 NOTE — PLAN OF CARE
Problem: Patient Care Overview  Goal: Plan of Care Review  Outcome: Ongoing (interventions implemented as appropriate)   09/16/19 6632   Coping/Psychosocial   Plan of Care Reviewed With patient   Plan of Care Review   Progress improving   OTHER   Outcome Summary Pt continues to improve BP within parameters, A&Ox4, face is symmetrical, equal bilateral strength in all extremities, family at bedside, pt passed swallow evaluation, slight slur seems to be d/t no teeth in place, will continue to monitor     Goal: Individualization and Mutuality  Outcome: Ongoing (interventions implemented as appropriate)    Goal: Discharge Needs Assessment  Outcome: Ongoing (interventions implemented as appropriate)    Goal: Interprofessional Rounds/Family Conf  Outcome: Ongoing (interventions implemented as appropriate)

## 2019-09-16 NOTE — THERAPY DISCHARGE NOTE
Acute Care - Occupational Therapy Initial Eval/Discharge  [facility]     Patient Name: Neptali Vera  : 1933  MRN: [id number]  Today's Date: 2019               Admit Date: 9/15/2019     No diagnosis found.  Patient Active Problem List   Diagnosis   • Ischemic stroke (CMS/HCC)     Past Medical History:   Diagnosis Date   • Hypertension    • Seizure (CMS/HCC)      No past surgical history on file.       OT ASSESSMENT FLOWSHEET (last 12 hours)      Occupational Therapy Evaluation     Row Name 19 1358                   OT Evaluation Time/Intention    Subjective Information  no complaints  -VS        Document Type  evaluation  -VS        Mode of Treatment  occupational therapy  -VS        Patient Effort  excellent  -VS           General Information    Patient Profile Reviewed?  yes  -VS        General Observations of Patient  Pt. was sitting in a chair in his room with his daughter present at his side   -VS        Prior Level of Function  independent:  -VS        Equipment Currently Used at Home  none  -VS        Existing Precautions/Restrictions  fall  -VS           Cognitive Assessment/Intervention- PT/OT    Orientation Status (Cognition)  oriented x 3  -VS        Follows Commands (Cognition)  WFL  -VS           Bed Mobility Assessment/Treatment    Comment (Bed Mobility)  NA  -VS           Transfer Assessment/Treatment    Transfer Assessment/Treatment  sit-stand transfer;stand-sit transfer  -VS           Sit-Stand Transfer    Sit-Stand [place] (Transfers)  conditional independence  -VS        Assistive Device (Sit-Stand Transfers)  -- increased time to complete   -VS           Stand-Sit Transfer    Stand-Sit [place] (Transfers)  conditional independence  -VS        Assistive Device (Stand-Sit Transfers)  -- increased time to complete   -VS           ADL Assessment/Intervention    BADL Assessment/Intervention  lower body dressing  -VS           Lower Body Dressing Assessment/Training     "Lower Body Dressing [place] Level  doff;don;shoes/slippers;socks;conditional independence  -VS        Lower Body Dressing Position  supported sitting  -VS           General ROM    GENERAL ROM COMMENTS  AROM WFL (B)ly   -VS           MMT (Manual Muscle Testing)    General MMT Comments  MMT WFLs (B)ly   -VS           Motor Assessment/Interventions    Additional Documentation  Fine Motor Testing & Training (Group)  -VS           Fine Motor Testing & Training    Comment, Fine Motor Coordination  no problems noted with fine motor skills with LE dressing   -VS           Positioning and Restraints    Pre-Treatment Position  sitting in chair/recliner  -VS        Post Treatment Position  chair  -VS        In Chair  notified nsg;sitting;call light within reach;encouraged to call for assist  -VS           Pain Assessment    Additional Documentation  Pain Scale: Numbers Pre/Post-Treatment (Group)  -VS           Pain Scale: Numbers Pre/Post-Treatment    Pain Scale: Numbers, Pretreatment  0/10 - no pain  -VS        Pain Scale: Numbers, Post-Treatment  0/10 - no pain  -VS           Plan of Care Review    Plan of Care Reviewed With  patient;daughter  -VS           Clinical Impression (OT)    Functional Level at Time of Evaluation (OT Eval)  -- (I) with LE dressing, AROM and strength WFL (B)ly   -VS        Patient/Family Goals Statement (OT Eval)  \"I want to go back home and be with my famliy\"  -VS        Criteria for Skilled Therapeutic Interventions Met (OT Eval)  no problems identified which require skilled intervention  -VS        Therapy Frequency (OT Eval)  evaluation only  -VS        Care Plan Review (OT)  evaluation/treatment results reviewed;patient/other agree to care plan  -VS           Planned OT Interventions    Planned Therapy Interventions (OT Eval)  adaptive equipment training  -VS           Patient Education Goal (OT)    Activity (Patient Education Goal, OT)  -- Educated the pt. and daughter on Tub safety " equipment for ho  -VS        [place]/Cues/Accuracy (Memory Goal 2, OT)  verbalizes understanding  -VS        Time Frame (Patient Education Goal, OT)  short term goal (STG);1 day  -VS        Progress/Outcome (Patient Education Goal, OT)  goal met  -VS          User Key  (r) = Recorded By, (t) = Taken By, (c) = Cosigned By    Initials Name Effective Dates    VS Yessenia Martinez OTR 06/08/18 -           Occupational Therapy Education     Title: PT OT SLP Therapies (Done)     Topic: Occupational Therapy (Done)     Point: Precautions (Done)     Description: Instruct learner(s) on prescribed precautions during self-care and functional transfers.    Learning Progress Summary           Patient Acceptance, E, VU by VS at 9/16/2019  2:06 PM    Comment:  Bathroom AE ( tub Seat) with would increase the pt. safety with shower at home, sit to shower   Family Acceptance, E, VU by VS at 9/16/2019  2:06 PM    Comment:  Bathroom AE ( tub Seat) with would increase the pt. safety with shower at home, sit to shower                               User Key     Initials Effective Dates Name Provider Type Discipline    VS 06/08/18 -  Yessenia Martinez OTR Occupational Therapist OT                OT Recommendation and Plan  Outcome Summary/Treatment Plan (OT)  Anticipated Discharge Disposition (OT): home with assist  Planned Therapy Interventions (OT Eval): adaptive equipment training  Therapy Frequency (OT Eval): evaluation only  Plan of Care Review  Plan of Care Reviewed With: patient, daughter  Plan of Care Reviewed With: patient, daughter  Outcome Summary: OT:  Pt. has all isolated movments and stregth with UEs to (A) with BADLs, Pt. was (I) with LE dressing.  OT educated the pt. and daughter on AE to use at home to increase safety with showering, Pt. currently has no skilled need for OT.       Rehab Goal Summary     Row Name 09/16/19 1358 09/16/19 1107          Bed Mobility Goal 1 (PT)    Activity/Assistive Device (Bed Mobility  Goal 1, PT)  --  sit to supine;supine to sit  -PC     [place] Level/Cues Needed (Bed Mobility Goal 1, PT)  --  supervision required  -PC     Time Frame (Bed Mobility Goal 1, PT)  --  1 week  -PC        Transfer Goal 1 (PT)    Activity/Assistive Device (Transfer Goal 1, PT)  --  sit-to-stand/stand-to-sit  -PC     [place] Level/Cues Needed (Transfer Goal 1, PT)  --  supervision required  -PC     Time Frame (Transfer Goal 1, PT)  --  1 week  -PC        Gait Training Goal 1 (PT)    Activity/Assistive Device (Gait Training Goal 1, PT)  --  gait (walking locomotion);assistive device use;decrease fall risk;improve balance and speed  -PC     [place] Level (Gait Training Goal 1, PT)  --  supervision required  -PC     Distance (Gait Goal 1, PT)  --  300 ft  -PC     Time Frame (Gait Training Goal 1, PT)  --  1 week  -PC        Patient Education Goal (OT)    Activity (Patient Education Goal, OT)  -- Educated the pt. and daughter on Tub safety equipment for ho  -VS  --     [place]/Cues/Accuracy (Memory Goal 2, OT)  verbalizes understanding  -VS  --     Time Frame (Patient Education Goal, OT)  short term goal (STG);1 day  -VS  --     Progress/Outcome (Patient Education Goal, OT)  goal met  -VS  --       User Key  (r) = Recorded By, (t) = Taken By, (c) = Cosigned By    Initials Name Provider Type Discipline    VS Yessenia Martinez OTR Occupational Therapist OT    PC Annalee Reynolds, PT Physical Therapist PT          Outcome Measures     Row Name 09/16/19 1400             How much help from another is currently needed...    Putting on and taking off regular lower body clothing?  3  -VS      Bathing (including washing, rinsing, and drying)  3  -VS      Toileting (which includes using toilet bed pan or urinal)  3  -VS      Putting on and taking off regular upper body clothing  4  -VS      Taking care of personal grooming (such as brushing teeth)  4  -VS      Eating meals  3  -VS      AM-PAC 6 Clicks Score (OT)  20   -VS         Modified [place] Scale    Modified [place] Scale  1 - No significant disability despite symptoms.  Able to carry out all usual duties and activities.  -VS         Functional Assessment    Outcome Measure Options  AM-PAC 6 Clicks Daily Activity (OT)  -VS        User Key  (r) = Recorded By, (t) = Taken By, (c) = Cosigned By    Initials Name Provider Type    VS Yessenia Martinez OTR Occupational Therapist          Time Calculation:   Time Calculation- OT     Row Name 09/16/19 1411             Time Calculation- OT    OT Start Time  1255  -VS      OT Stop Time  1309  -VS      OT Time Calculation (min)  14 min  -VS        User Key  (r) = Recorded By, (t) = Taken By, (c) = Cosigned By    Initials Name Provider Type    VS Yessenia Martinez OTR Occupational Therapist        Therapy Suggested Charges     Code   Minutes Charges    None           Therapy Charges for Today     Code Description Service Date Service Provider Modifiers Qty    [id number]  OT EVAL LOW COMPLEXITY 2 9/16/2019 Yessenia Martinez OTR GO 1               OT Discharge Summary  Anticipated Discharge Disposition (OT): home with assist  Reason for Discharge: All goals achieved    DAVID Barrera  9/16/2019

## 2019-09-17 ENCOUNTER — APPOINTMENT (OUTPATIENT)
Dept: NEUROLOGY | Facility: HOSPITAL | Age: 84
End: 2019-09-17

## 2019-09-17 ENCOUNTER — APPOINTMENT (OUTPATIENT)
Dept: CARDIOLOGY | Facility: HOSPITAL | Age: 84
End: 2019-09-17

## 2019-09-17 LAB
ALBUMIN SERPL-MCNC: 3.1 G/DL (ref 3.5–5.2)
ANION GAP SERPL CALCULATED.3IONS-SCNC: 12.7 MMOL/L (ref 5–15)
BH CV LOWER VASCULAR LEFT COMMON FEMORAL AUGMENT: NORMAL
BH CV LOWER VASCULAR LEFT COMMON FEMORAL COMPETENT: NORMAL
BH CV LOWER VASCULAR LEFT COMMON FEMORAL COMPRESS: NORMAL
BH CV LOWER VASCULAR LEFT COMMON FEMORAL PHASIC: NORMAL
BH CV LOWER VASCULAR LEFT COMMON FEMORAL SPONT: NORMAL
BH CV LOWER VASCULAR LEFT DISTAL FEMORAL COMPRESS: NORMAL
BH CV LOWER VASCULAR LEFT GASTRONEMIUS COMPRESS: NORMAL
BH CV LOWER VASCULAR LEFT GREATER SAPH AK COMPRESS: NORMAL
BH CV LOWER VASCULAR LEFT GREATER SAPH BK COMPRESS: NORMAL
BH CV LOWER VASCULAR LEFT MID FEMORAL AUGMENT: NORMAL
BH CV LOWER VASCULAR LEFT MID FEMORAL COMPETENT: NORMAL
BH CV LOWER VASCULAR LEFT MID FEMORAL COMPRESS: NORMAL
BH CV LOWER VASCULAR LEFT MID FEMORAL PHASIC: NORMAL
BH CV LOWER VASCULAR LEFT MID FEMORAL SPONT: NORMAL
BH CV LOWER VASCULAR LEFT PERONEAL COMPRESS: NORMAL
BH CV LOWER VASCULAR LEFT POPLITEAL AUGMENT: NORMAL
BH CV LOWER VASCULAR LEFT POPLITEAL COMPETENT: NORMAL
BH CV LOWER VASCULAR LEFT POPLITEAL COMPRESS: NORMAL
BH CV LOWER VASCULAR LEFT POPLITEAL PHASIC: NORMAL
BH CV LOWER VASCULAR LEFT POPLITEAL SPONT: NORMAL
BH CV LOWER VASCULAR LEFT POSTERIOR TIBIAL COMPRESS: NORMAL
BH CV LOWER VASCULAR LEFT PROXIMAL FEMORAL COMPRESS: NORMAL
BH CV LOWER VASCULAR LEFT SAPHENOFEMORAL JUNCTION COMPRESS: NORMAL
BH CV LOWER VASCULAR RIGHT COMMON FEMORAL AUGMENT: NORMAL
BH CV LOWER VASCULAR RIGHT COMMON FEMORAL COMPETENT: NORMAL
BH CV LOWER VASCULAR RIGHT COMMON FEMORAL COMPRESS: NORMAL
BH CV LOWER VASCULAR RIGHT COMMON FEMORAL PHASIC: NORMAL
BH CV LOWER VASCULAR RIGHT COMMON FEMORAL SPONT: NORMAL
BH CV LOWER VASCULAR RIGHT DISTAL FEMORAL COMPRESS: NORMAL
BH CV LOWER VASCULAR RIGHT GASTRONEMIUS COMPRESS: NORMAL
BH CV LOWER VASCULAR RIGHT GREATER SAPH AK COMPRESS: NORMAL
BH CV LOWER VASCULAR RIGHT GREATER SAPH BK COMPRESS: NORMAL
BH CV LOWER VASCULAR RIGHT MID FEMORAL AUGMENT: NORMAL
BH CV LOWER VASCULAR RIGHT MID FEMORAL COMPETENT: NORMAL
BH CV LOWER VASCULAR RIGHT MID FEMORAL COMPRESS: NORMAL
BH CV LOWER VASCULAR RIGHT MID FEMORAL PHASIC: NORMAL
BH CV LOWER VASCULAR RIGHT MID FEMORAL SPONT: NORMAL
BH CV LOWER VASCULAR RIGHT PERONEAL COMPRESS: NORMAL
BH CV LOWER VASCULAR RIGHT POPLITEAL AUGMENT: NORMAL
BH CV LOWER VASCULAR RIGHT POPLITEAL COMPETENT: NORMAL
BH CV LOWER VASCULAR RIGHT POPLITEAL COMPRESS: NORMAL
BH CV LOWER VASCULAR RIGHT POPLITEAL PHASIC: NORMAL
BH CV LOWER VASCULAR RIGHT POPLITEAL SPONT: NORMAL
BH CV LOWER VASCULAR RIGHT POSTERIOR TIBIAL COMPRESS: NORMAL
BH CV LOWER VASCULAR RIGHT PROXIMAL FEMORAL COMPRESS: NORMAL
BH CV LOWER VASCULAR RIGHT SAPHENOFEMORAL JUNCTION COMPRESS: NORMAL
BUN BLD-MCNC: 18 MG/DL (ref 8–23)
BUN/CREAT SERPL: 17.3 (ref 7–25)
CALCIUM SPEC-SCNC: 7.9 MG/DL (ref 8.6–10.5)
CHLORIDE SERPL-SCNC: 103 MMOL/L (ref 98–107)
CO2 SERPL-SCNC: 22.3 MMOL/L (ref 22–29)
CREAT BLD-MCNC: 1.04 MG/DL (ref 0.76–1.27)
GFR SERPL CREATININE-BSD FRML MDRD: 68 ML/MIN/1.73
GLUCOSE BLD-MCNC: 104 MG/DL (ref 65–99)
PHOSPHATE SERPL-MCNC: 2 MG/DL (ref 2.5–4.5)
PHOSPHATE SERPL-MCNC: 2.1 MG/DL (ref 2.5–4.5)
POTASSIUM BLD-SCNC: 3.8 MMOL/L (ref 3.5–5.2)
SODIUM BLD-SCNC: 138 MMOL/L (ref 136–145)

## 2019-09-17 PROCEDURE — 95819 EEG AWAKE AND ASLEEP: CPT | Performed by: PSYCHIATRY & NEUROLOGY

## 2019-09-17 PROCEDURE — 97110 THERAPEUTIC EXERCISES: CPT

## 2019-09-17 PROCEDURE — 25010000002 ONDANSETRON PER 1 MG: Performed by: INTERNAL MEDICINE

## 2019-09-17 PROCEDURE — 95816 EEG AWAKE AND DROWSY: CPT

## 2019-09-17 PROCEDURE — 93970 EXTREMITY STUDY: CPT

## 2019-09-17 PROCEDURE — 80069 RENAL FUNCTION PANEL: CPT | Performed by: INTERNAL MEDICINE

## 2019-09-17 PROCEDURE — 84100 ASSAY OF PHOSPHORUS: CPT | Performed by: INTERNAL MEDICINE

## 2019-09-17 PROCEDURE — 99233 SBSQ HOSP IP/OBS HIGH 50: CPT | Performed by: PSYCHIATRY & NEUROLOGY

## 2019-09-17 RX ORDER — CLOPIDOGREL BISULFATE 75 MG/1
75 TABLET ORAL DAILY
Status: DISCONTINUED | OUTPATIENT
Start: 2019-09-17 | End: 2019-09-20 | Stop reason: HOSPADM

## 2019-09-17 RX ADMIN — POTASSIUM & SODIUM PHOSPHATES POWDER PACK 280-160-250 MG 2 PACKET: 280-160-250 PACK at 16:07

## 2019-09-17 RX ADMIN — SODIUM CHLORIDE, PRESERVATIVE FREE 10 ML: 5 INJECTION INTRAVENOUS at 08:02

## 2019-09-17 RX ADMIN — ATORVASTATIN CALCIUM 80 MG: 80 TABLET, FILM COATED ORAL at 21:18

## 2019-09-17 RX ADMIN — ONDANSETRON 4 MG: 2 INJECTION INTRAMUSCULAR; INTRAVENOUS at 18:26

## 2019-09-17 RX ADMIN — ACETAMINOPHEN 650 MG: 325 TABLET, FILM COATED ORAL at 05:30

## 2019-09-17 RX ADMIN — ASPIRIN 325 MG: 325 TABLET ORAL at 08:01

## 2019-09-17 RX ADMIN — ACETAMINOPHEN 650 MG: 325 TABLET, FILM COATED ORAL at 14:29

## 2019-09-17 RX ADMIN — ACETYLCYSTEINE 600 MG: 200 SOLUTION ORAL; RESPIRATORY (INHALATION) at 09:06

## 2019-09-17 RX ADMIN — CLOPIDOGREL 75 MG: 75 TABLET, FILM COATED ORAL at 10:54

## 2019-09-17 RX ADMIN — FAMOTIDINE 20 MG: 20 TABLET, FILM COATED ORAL at 08:01

## 2019-09-17 NOTE — PLAN OF CARE
Problem: Patient Care Overview  Goal: Plan of Care Review  Outcome: Ongoing (interventions implemented as appropriate)   09/17/19 9661   Coping/Psychosocial   Plan of Care Reviewed With patient   Plan of Care Review   Progress no change   OTHER   Outcome Summary No changes overnight, pt did complain of some nausea and vomited 900 cc's of dark green emesis, stated his stomach had been feeling upset since receiving acetylcysteine at bedtime. No further complaints of nausea and no further vomiting, neuro status unchanged. Vitals within parameters. Will continue to monitor. DARON Beatty RN

## 2019-09-17 NOTE — PROGRESS NOTES
NEUROLOGY PROGRESS NOTE - STROKE TEAM    DOS: 2019  NAME: Neptali Vera   : 1933  PCP: Provider, No Known  CC: Stroke  Referring MD: Aniceto Dexter MD  Patient being seen at request of Dr. Dexter for advice and opinion on right MCA occlusion/TIA  Prior note copied forward, reviewed and edited for content.     Neurological Problem and Interval History:  85 y.o. male presented with an acute right MCA occlusion documented by CTA, received tPA and then on follow-up a few hours later had resolved the thrombus. Patient has a history of sick sinus syndrome with a pacemaker but interrogation did not identify any arrhythmias.        Physical Examination:   /67   Pulse 61   Temp 98.6 °F (37 °C) (Oral)   Resp 19   Wt 74.7 kg (164 lb 10.9 oz)   SpO2 96%   BMI 24.32 kg/m²   General Appearance:   Well developed, well nourished, well groomed, alert, and cooperative.  HEENT: Normocephalic. Atraumatic. No    Neurological examination:  Higher Integrative  Function: Alert, follows commands, can tell time on clock, normal speech. Positive bilateral palmomentalis, negative grasp, root, snout.  No neglect or extinction. Mild to moderate palatal and lingual dysarthria. Symmetric strength, no pronator drift.    NIHSS:     Diagnoses / Discussion:  85 y.o. who presents with Sx of acute right MCA occlusion with successful thrombolysis by tPA and stable head CT. Likely cardioembolic as patient's CTA does not show a high grade proximal vessel occlusion but pacer interrogation reportedly negative. TTE does not identify a cardiac source of emboli. Curious case.     Patient reports having nosebleeds on aspirin but was taking 81mg. May wish to consider short-term dual antiplatelet or anticoagulation given the life threatening event.     2) Seizure disorder? Patient reports that in  when his pacemaker was originally placed, he was told he had a seizure and was started on dilantin at that time.  He reports he's never had a  seizure since then but he was told to continue taking the dilantin. Holding dilantin and hopefully he can be off of that.    3) Large left flank hematoma - Patient fell on his left side and has a large hematoma, tenderness. Consider evaluation for an occult fracture.    Plan:  Please confirm pacer shows no arrhythmia. If negative, APRIL for right MCA occlusion of unknown etiology. Pacer, TTE both negative.    Plavix plus aspirin for 30 days.    Medium dose statin.    Call 911 for stroke any stroke symptoms  Time spent with patient: 35 minutes    Coding     Jordy Solares MD  Neurology

## 2019-09-17 NOTE — PROGRESS NOTES
LOS: 2 days   Patient Care Team:  Provider, No Known as PCP - General    Chief Complaint:  F/up stroke post TPA, ICU management and medical problems listed below    Subjective   Interval History  Doing well today.  He denies headache.  However, he had an episode of vomiting earlier.  No nausea or abdominal pain.  No changes in bowel habits.    REVIEW OF SYSTEMS:   CARDIOVASCULAR: No chest pain, chest pressure or chest discomfort. No palpitations or edema.   RESPIRATORY: No shortness of breath, cough or sputum.   GASTROINTESTINAL: No anorexia, nausea, or diarrhea. No abdominal pain or blood.   HEMATOLOGIC: No bleeding or bruising.     Ventilator/Non-Invasive Ventilation Settings (From admission, onward)    None                Physical Exam:     Vital Signs  Temp:  [97.7 °F (36.5 °C)-100 °F (37.8 °C)] 98.6 °F (37 °C)  Heart Rate:  [58-94] 67  Resp:  [18-22] 19  BP: (108-167)/() 128/67    Intake/Output Summary (Last 24 hours) at 9/17/2019 1150  Last data filed at 9/17/2019 0533  Gross per 24 hour   Intake 1459.5 ml   Output 1750 ml   Net -290.5 ml     Flowsheet Rows      First Filed Value   Admission Height  --   Admission Weight  73.5 kg (162 lb 0.6 oz) Documented at 09/15/2019 1015          General Appearance:    Alert, cooperative, in no acute distress   ENMT:  Mallampati score 3, moist mucous membrane   Eyes: Pupils equals and reactive to light. EOMI   Neck:   Trachea midline. No thyromegaly.   Lungs:     Clear to auscultation,respirations regular, even and                  unlabored    Heart:    Regular rhythm and normal rate, normal S1 and S2, no            murmur   Skin:    No abnormalities observed   Abdomen:     Soft. No tenderness. No HSM.   Neuro:   Conscious, alert, oriented x3. Strength 5/5 in upper and lower ext   Extremities:   Moves all extremities well, no edema, no cyanosis, no             Redness          Results Review:        Results from last 7  days   Lab Units 09/17/19  0418 09/16/19  0529 09/15/19  0656   SODIUM mmol/L 138 139 139   POTASSIUM mmol/L 3.8 3.7 3.9   CHLORIDE mmol/L 103 105 107   CO2 mmol/L 22.3 22.8 25.0   BUN mg/dL 18 20 20   CREATININE mg/dL 1.04 1.16 1.40*   GLUCOSE mg/dL 104* 99 109*   CALCIUM mg/dL 7.9* 8.2* 8.9     Results from last 7 days   Lab Units 09/15/19  0656   TROPONIN I ng/mL <0.030     Results from last 7 days   Lab Units 09/16/19  0529 09/15/19  0656   WBC 10*3/mm3 6.96 5.70   HEMOGLOBIN g/dL 11.8* 14.0   HEMATOCRIT % 35.3* 41.0   PLATELETS 10*3/mm3 142 160     Results from last 7 days   Lab Units 09/15/19  0656   INR  1.05   APTT seconds 25.6           I reviewed the patient's new clinical results.  I personally viewed and interpreted the patient's CXR        Medication Review:     aspirin 325 mg Oral Daily   Or      aspirin 300 mg Rectal Daily   atorvastatin 80 mg Oral Nightly   clopidogrel 75 mg Oral Daily   famotidine 20 mg Oral BID AC   sodium chloride 10 mL Intravenous Q12H           Assessment   1. RMCA ischemic stroke s/p TPA 9/15/2019  2. 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 MICHELA, improved with IV hydration  4. Essential hypertension  5. Dyslipidemia  6. Sick sinus syndrome s/p pacemaker  7. Urinary frequency and dysuria  8. Microscopic hematuria  9. History of seizure in 2009  10. Hypophosphatemia, NEW  11. Anemia  12. Vomiting, NEW  13. Mild to moderate MR  14. Possible cardiac shunt        Plan   · Aspirin and statin for stroke.  · Check Doppler of the extremities to look for DVT in the setting of stroke and possible cardiac shunt  · APRIL per cardiology, neuro recommendation  · Dilantin held per neurology  · Zofran as needed for vomiting  · Replace phosphorus  · CBC and BMP in a.m.  · Transfer to floor    Discussed with ICU team during the multidisciplinary round.    Aniceto Dexter MD  09/17/19  11:50 AM        This note was dictated utilizing Dragon dictation

## 2019-09-17 NOTE — PLAN OF CARE
Problem: Patient Care Overview  Goal: Plan of Care Review  Outcome: Ongoing (interventions implemented as appropriate)   09/17/19 1341   Coping/Psychosocial   Plan of Care Reviewed With patient   OTHER   Outcome Summary pt cont to demonstrate unsteady gait, will cont to assess for cane v walker

## 2019-09-17 NOTE — NURSING NOTE
Pt transferred to room 516 via bed and 2 transporters.  Phone, glasses, and dentures are located in green bag with patients other belonging.

## 2019-09-17 NOTE — PROGRESS NOTES
Discharge Planning Assessment  [facility]     Patient Name: Neptali Vera  MRN: [id number]  Today's Date: 9/17/2019    Admit Date: 9/15/2019    Discharge Needs Assessment     Row Name 09/17/19 0908       Living Environment    Lives With  child(mk), adult    Current Living Arrangements  home/apartment/condo    Provides Primary Care For  no one    Family Caregiver if Needed  child(mk), adult    Quality of Family Relationships  supportive    Able to Return to Prior Arrangements  yes       Resource/Environmental Concerns    Resource/Environmental Concerns  none    Transportation Concerns  car, none       Transition Planning    Patient/Family Anticipates Transition to  home with family    Transportation Anticipated  family or friend will provide       Discharge Needs Assessment    Concerns to be Addressed  no discharge needs identified    Equipment Currently Used at Home  none    Anticipated Changes Related to Illness  none    Equipment Needed After Discharge  none    Offered/Gave Vendor List  yes    Patient's Choice of Community Agency(s)  pt declines  stating he has no needs        Discharge Plan     Row Name 09/17/19 0909       Plan    Plan  Home with daughter  denies needs    Plan Comments  IMM noted.  CCP spoke to patient at bedside to discuss discharge planning.  CCP role explained.  Face sheet verified.  Pt lives with his daughter Flor, [phone] a single story house.   His emergency contact is his daughter Flor. He uses no DME to ambulate.  He is independent with ADL's.  [facility] Pt has o home health or rehab history  He declines a referral to .  CCP following for discharge            Destination      No service coordination in this encounter.      Durable Medical Equipment      No service coordination in this encounter.      Dialysis/Infusion      No service coordination in this encounter.      Home Medical Care      No service coordination in this encounter.       Therapy      No service coordination in this encounter.      Community Resources      No service coordination in this encounter.          Demographic Summary    No documentation.       Functional Status    No documentation.       Psychosocial    No documentation.       Abuse/Neglect    No documentation.       Legal    No documentation.       Substance Abuse    No documentation.       Patient Forms    No documentation.           Cristine Horner RN

## 2019-09-17 NOTE — THERAPY TREATMENT NOTE
Patient Name: Neptali Vera  : 1933    MRN: [id number]                              Today's Date: 2019       Admit Date: 9/15/2019    Visit Dx: No diagnosis found.  Patient Active Problem List   Diagnosis   • Ischemic stroke (CMS/HCC)     Past Medical History:   Diagnosis Date   • Hypertension    • Seizure (CMS/HCC)      No past surgical history on file.  General Information     Row Name 19 1334          PT Evaluation Time/Intention    Document Type  therapy note (daily note)  -PC     Mode of Treatment  physical therapy  -PC     Row Name 19 1334          Cognitive Assessment/Intervention- PT/OT    Orientation Status (Cognition)  oriented x 3  -PC     Row Name 19 1334          Safety Issues, Functional Mobility    Safety Issues Affecting Function (Mobility)  insight into deficits/self awareness  -PC     Impairments Affecting Function (Mobility)  balance  -PC       User Key  (r) = Recorded By, (t) = Taken By, (c) = Cosigned By    Initials Name Provider Type    PC Annalee Reynolds PT Physical Therapist        Mobility     Row Name 19 1334          Bed Mobility Assessment/Treatment    Supine-Sit [place] (Bed Mobility)  contact guard  -PC     Row Name 19 1334          Sit-Stand Transfer    Sit-Stand [place] (Transfers)  contact guard  -PC     Row Name 19 1334          Gait/Stairs Assessment/Training    [place] Level (Gait)  minimum assist (75% patient effort)  -PC     Assistive Device (Gait)  -- HHA  -PC     Distance in Feet (Gait)  150 ft  -PC     Deviations/Abnormal Patterns (Gait)  festinating/shuffling;base of support, wide;stride length decreased  -PC     Bilateral Gait Deviations  heel strike decreased  -PC     Comment (Gait/Stairs)  pt has some ataxic gait, cont to assess need for assistive device, walker v cane, pt has cane if needed, would need a walker ordered  -PC       User Key  (r) = Recorded By, (t) = Taken By, (c) = Cosigned By    Initials Name  Provider Type    PC Annalee Reynolds, MAYA Physical Therapist        Obj/Interventions     Row Name 09/17/19 1339          Therapeutic Exercise    Sets/Reps (Therapeutic Exercise)  10 reps up on toes, knee bends, marching  -PC       User Key  (r) = Recorded By, (t) = Taken By, (c) = Cosigned By    Initials Name Provider Type    PC Annalee Reynolds, PT Physical Therapist        Goals/Plan    No documentation.       Clinical Impression     Row Name 09/17/19 1340          Pain Scale: Numbers Pre/Post-Treatment    Pre/Post Treatment Pain Comment  L flank sore with bruising  -PC     Row Name 09/17/19 1340          Positioning and Restraints    Pre-Treatment Position  in bed  -PC     Post Treatment Position  chair  -PC     In Chair  reclined;call light within reach;encouraged to call for assist  -PC       User Key  (r) = Recorded By, (t) = Taken By, (c) = Cosigned By    Initials Name Provider Type    PC Annalee Reynolds, PT Physical Therapist        Outcome Measures     Row Name 09/17/19 1342          How much help from another person do you currently need...    Turning from your back to your side while in flat bed without using bedrails?  4  -PC     Moving from lying on back to sitting on the side of a flat bed without bedrails?  4  -PC     Moving to and from a bed to a chair (including a wheelchair)?  3  -PC     Standing up from a chair using your arms (e.g., wheelchair, bedside chair)?  3  -PC     Climbing 3-5 steps with a railing?  3  -PC     To walk in hospital room?  3  -PC     AM-PAC 6 Clicks Score (PT)  20  -PC       User Key  (r) = Recorded By, (t) = Taken By, (c) = Cosigned By    Initials Name Provider Type    PC Annalee Reynolds, PT Physical Therapist        Physical Therapy Education     Title: PT OT SLP Therapies (Done)     Topic: Physical Therapy (Done)     Point: Mobility training (Done)     Learning Progress Summary           Patient Acceptance, E,D, DU by PC at 9/17/2019  1:41 PM    Acceptance, E,D, DU by PC at  9/16/2019 11:09 AM                   Point: Home exercise program (Done)     Learning Progress Summary           Patient Acceptance, E,D, DU by PC at 9/17/2019  1:41 PM    Acceptance, E,D, DU by PC at 9/16/2019 11:09 AM                   Point: Body mechanics (Done)     Learning Progress Summary           Patient Acceptance, E,D, DU by PC at 9/17/2019  1:41 PM    Acceptance, E,D, DU by PC at 9/16/2019 11:09 AM                   Point: Precautions (Done)     Learning Progress Summary           Patient Acceptance, E,D, DU by PC at 9/17/2019  1:41 PM    Acceptance, E,D, DU by PC at 9/16/2019 11:09 AM                               User Key     Initials Effective Dates Name Provider Type [facility] 04/03/18 -  Annalee Reynolds, PT Physical Therapist PT              PT Recommendation and Plan     Outcome Summary/Treatment Plan (PT)  Anticipated Discharge Disposition (PT): home with assist, home with home health  Plan of Care Reviewed With: patient  Outcome Summary: pt cont to demonstrate unsteady gait, will cont to assess for cane v walker     Time Calculation:   PT Charges     Row Name 09/17/19 1342             Time Calculation    Start Time  1313  -PC      Stop Time  1322  -PC      Time Calculation (min)  9 min  -PC      PT Received On  09/17/19  -PC      PT - Next Appointment  09/18/19  -PC        User Key  (r) = Recorded By, (t) = Taken By, (c) = Cosigned By    Initials Name Provider Type    PC Annalee Reynolds, PT Physical Therapist        Therapy Charges for Today     Code Description Service Date Service Provider Modifiers Qty    25839734031 HC PT EVAL LOW COMPLEXITY 2 9/16/2019 Annalee Reynolds G, PT GP 1    35344712031 HC PT THER PROC EA 15 MIN 9/16/2019 Florencia Reynoldsgy G, PT GP 1    44404848286 HC PT THER SUPP EA 15 MIN 9/16/2019 Annalee Reynolds, PT GP 1    12885762519 HC PT THER PROC EA 15 MIN 9/17/2019 Annalee Reynolds G, PT GP 1    30950533493 HC PT THER SUPP EA 15 MIN 9/17/2019 Annalee Reynolds, PT GP 1           PT G-Codes  Outcome Measure Options: AM-PAC 6 Clicks Daily Activity (OT)  AM-PAC 6 Clicks Score (PT): 20  AM-PAC 6 Clicks Score (OT): 20  Modified Labelle Scale: 1 - No significant disability despite symptoms.  Able to carry out all usual duties and activities.    Annalee Reynolds, PT  9/17/2019

## 2019-09-18 LAB
ALBUMIN SERPL-MCNC: 3.5 G/DL (ref 3.5–5.2)
ANION GAP SERPL CALCULATED.3IONS-SCNC: 10 MMOL/L (ref 5–15)
BASOPHILS # BLD AUTO: 0.08 10*3/MM3 (ref 0–0.2)
BASOPHILS NFR BLD AUTO: 0.7 % (ref 0–1.5)
BUN BLD-MCNC: 25 MG/DL (ref 8–23)
BUN/CREAT SERPL: 19.2 (ref 7–25)
CALCIUM SPEC-SCNC: 7.5 MG/DL (ref 8.6–10.5)
CHLORIDE SERPL-SCNC: 109 MMOL/L (ref 98–107)
CO2 SERPL-SCNC: 21 MMOL/L (ref 22–29)
CREAT BLD-MCNC: 1.3 MG/DL (ref 0.76–1.27)
DEPRECATED RDW RBC AUTO: 47.5 FL (ref 37–54)
EOSINOPHIL # BLD AUTO: 0.14 10*3/MM3 (ref 0–0.4)
EOSINOPHIL NFR BLD AUTO: 1.2 % (ref 0.3–6.2)
ERYTHROCYTE [DISTWIDTH] IN BLOOD BY AUTOMATED COUNT: 12.9 % (ref 12.3–15.4)
GFR SERPL CREATININE-BSD FRML MDRD: 52 ML/MIN/1.73
GLUCOSE BLD-MCNC: 110 MG/DL (ref 65–99)
HCT VFR BLD AUTO: 35.1 % (ref 37.5–51)
HGB BLD-MCNC: 11.7 G/DL (ref 13–17.7)
IMM GRANULOCYTES # BLD AUTO: 0.06 10*3/MM3 (ref 0–0.05)
IMM GRANULOCYTES NFR BLD AUTO: 0.5 % (ref 0–0.5)
LYMPHOCYTES # BLD AUTO: 1.11 10*3/MM3 (ref 0.7–3.1)
LYMPHOCYTES NFR BLD AUTO: 9.2 % (ref 19.6–45.3)
MCH RBC QN AUTO: 33.9 PG (ref 26.6–33)
MCHC RBC AUTO-ENTMCNC: 33.3 G/DL (ref 31.5–35.7)
MCV RBC AUTO: 101.7 FL (ref 79–97)
MONOCYTES # BLD AUTO: 0.71 10*3/MM3 (ref 0.1–0.9)
MONOCYTES NFR BLD AUTO: 5.9 % (ref 5–12)
NEUTROPHILS # BLD AUTO: 9.96 10*3/MM3 (ref 1.7–7)
NEUTROPHILS NFR BLD AUTO: 82.5 % (ref 42.7–76)
NRBC BLD AUTO-RTO: 0 /100 WBC (ref 0–0.2)
PHOSPHATE SERPL-MCNC: 1.5 MG/DL (ref 2.5–4.5)
PHOSPHATE SERPL-MCNC: 2.1 MG/DL (ref 2.5–4.5)
PLATELET # BLD AUTO: 119 10*3/MM3 (ref 140–450)
PMV BLD AUTO: 10.2 FL (ref 6–12)
POTASSIUM BLD-SCNC: 3.6 MMOL/L (ref 3.5–5.2)
RBC # BLD AUTO: 3.45 10*6/MM3 (ref 4.14–5.8)
SODIUM BLD-SCNC: 140 MMOL/L (ref 136–145)
WBC NRBC COR # BLD: 12.06 10*3/MM3 (ref 3.4–10.8)

## 2019-09-18 PROCEDURE — 85025 COMPLETE CBC W/AUTO DIFF WBC: CPT | Performed by: INTERNAL MEDICINE

## 2019-09-18 PROCEDURE — 97110 THERAPEUTIC EXERCISES: CPT

## 2019-09-18 PROCEDURE — 25010000002 CYANOCOBALAMIN PER 1000 MCG: Performed by: NURSE PRACTITIONER

## 2019-09-18 PROCEDURE — 99233 SBSQ HOSP IP/OBS HIGH 50: CPT | Performed by: INTERNAL MEDICINE

## 2019-09-18 PROCEDURE — 80069 RENAL FUNCTION PANEL: CPT | Performed by: INTERNAL MEDICINE

## 2019-09-18 PROCEDURE — 99232 SBSQ HOSP IP/OBS MODERATE 35: CPT | Performed by: NURSE PRACTITIONER

## 2019-09-18 PROCEDURE — 84100 ASSAY OF PHOSPHORUS: CPT | Performed by: INTERNAL MEDICINE

## 2019-09-18 RX ORDER — ASPIRIN 81 MG/1
81 TABLET ORAL DAILY
Status: DISCONTINUED | OUTPATIENT
Start: 2019-09-18 | End: 2019-09-20 | Stop reason: HOSPADM

## 2019-09-18 RX ORDER — CHOLECALCIFEROL (VITAMIN D3) 125 MCG
1000 CAPSULE ORAL DAILY
Status: DISCONTINUED | OUTPATIENT
Start: 2019-09-21 | End: 2019-09-20 | Stop reason: HOSPADM

## 2019-09-18 RX ORDER — ATORVASTATIN CALCIUM 20 MG/1
40 TABLET, FILM COATED ORAL NIGHTLY
Status: DISCONTINUED | OUTPATIENT
Start: 2019-09-18 | End: 2019-09-20 | Stop reason: HOSPADM

## 2019-09-18 RX ORDER — CYANOCOBALAMIN 1000 UG/ML
1000 INJECTION, SOLUTION INTRAMUSCULAR; SUBCUTANEOUS DAILY
Status: COMPLETED | OUTPATIENT
Start: 2019-09-18 | End: 2019-09-20

## 2019-09-18 RX ADMIN — POTASSIUM & SODIUM PHOSPHATES POWDER PACK 280-160-250 MG 2 PACKET: 280-160-250 PACK at 08:09

## 2019-09-18 RX ADMIN — CYANOCOBALAMIN 1000 MCG: 1000 INJECTION, SOLUTION INTRAMUSCULAR; SUBCUTANEOUS at 13:42

## 2019-09-18 RX ADMIN — ATORVASTATIN CALCIUM 40 MG: 20 TABLET, FILM COATED ORAL at 20:07

## 2019-09-18 RX ADMIN — POTASSIUM & SODIUM PHOSPHATES POWDER PACK 280-160-250 MG 2 PACKET: 280-160-250 PACK at 17:16

## 2019-09-18 RX ADMIN — FAMOTIDINE 20 MG: 20 TABLET, FILM COATED ORAL at 08:09

## 2019-09-18 RX ADMIN — ASPIRIN 325 MG: 325 TABLET ORAL at 08:09

## 2019-09-18 RX ADMIN — POTASSIUM & SODIUM PHOSPHATES POWDER PACK 280-160-250 MG 2 PACKET: 280-160-250 PACK at 23:48

## 2019-09-18 RX ADMIN — FAMOTIDINE 20 MG: 20 TABLET, FILM COATED ORAL at 17:16

## 2019-09-18 RX ADMIN — SODIUM CHLORIDE, PRESERVATIVE FREE 10 ML: 5 INJECTION INTRAVENOUS at 08:09

## 2019-09-18 RX ADMIN — CLOPIDOGREL 75 MG: 75 TABLET, FILM COATED ORAL at 08:09

## 2019-09-18 RX ADMIN — SODIUM CHLORIDE, PRESERVATIVE FREE 10 ML: 5 INJECTION INTRAVENOUS at 23:49

## 2019-09-18 NOTE — CONSULTS
LOS: 3 days   Patient Care Team:  Provider, No Known as PCP - General    Chief Complaint:    CVA,need abbi     Interval History:     He has no chest pain, difficulty breathing, tachycardia, dizziness, syncope.  Has had palpitations for years.  He has no difficulty swallowing.  He said no surgeries on his esophagus or stomach.  He overall feels fine.  We talked about the findings of his scans of his brain and the need for ABBI.    Objective   Vital Signs  Temp:  [97.6 °F (36.4 °C)-99 °F (37.2 °C)] 98.9 °F (37.2 °C)  Heart Rate:  [60-79] 73  Resp:  [16-24] 16  BP: ()/(55-64) 111/61    Intake/Output Summary (Last 24 hours) at 9/18/2019 1131  Last data filed at 9/18/2019 0535  Gross per 24 hour   Intake --   Output 275 ml   Net -275 ml       Comfortable NAD  Neck supple, no JVD or thyromegaly appreciated  S1/S2 RRR, no m/r/g  Lungs CTA B, normal effort  Abdomen S/NT/ND (+) BS, no HSM appreciated  Extremities warm, no clubbing, cyanosis, or edema  No visible or palpable skin lesions  A/Ox4, mood and affect appropriate    Results Review:      Results from last 7 days   Lab Units 09/18/19  0604 09/17/19  0418 09/16/19  0529   SODIUM mmol/L 140 138 139   POTASSIUM mmol/L 3.6 3.8 3.7   CHLORIDE mmol/L 109* 103 105   CO2 mmol/L 21.0* 22.3 22.8   BUN mg/dL 25* 18 20   CREATININE mg/dL 1.30* 1.04 1.16   GLUCOSE mg/dL 110* 104* 99   CALCIUM mg/dL 7.5* 7.9* 8.2*     Results from last 7 days   Lab Units 09/15/19  0656   TROPONIN I ng/mL <0.030     Results from last 7 days   Lab Units 09/18/19  0604 09/16/19  0529 09/15/19  0656   WBC 10*3/mm3 12.06* 6.96 5.70   HEMOGLOBIN g/dL 11.7* 11.8* 14.0   HEMATOCRIT % 35.1* 35.3* 41.0   PLATELETS 10*3/mm3 119* 142 160     Results from last 7 days   Lab Units 09/15/19  0656   INR  1.05   APTT seconds 25.6     Results from last 7 days   Lab Units 09/16/19  0529   CHOLESTEROL mg/dL 122         Results from last 7 days   Lab Units 09/16/19  0529   CHOLESTEROL mg/dL 122   TRIGLYCERIDES  mg/dL 69   HDL CHOL mg/dL 48   LDL CHOL mg/dL 60       I reviewed the patient's new clinical results.  I personally viewed and interpreted the patient's EKG/Telemetry data        Medication Review:     aspirin 81 mg Oral Daily   atorvastatin 40 mg Oral Nightly   clopidogrel 75 mg Oral Daily   cyanocobalamin 1,000 mcg Intramuscular Daily   Followed by      [START ON 9/21/2019] vitamin B-12 1,000 mcg Oral Daily   famotidine 20 mg Oral BID AC   sodium chloride 10 mL Intravenous Q12H            Assessment/Plan       Ischemic stroke (CMS/HCC)    1. Right MCA CVA, s/p tPA on 9/15/19 with resolution of symptoms.   2. Bilateral occipital infarcts.   3. SSS, s/p pacer, no arrhythmia noted pacer interrogation.   4. Hypertension.     APRIL in am - d/w pt and he is agreeable.     Cesilia Rangel MD  09/18/19  11:31 AM

## 2019-09-18 NOTE — PROGRESS NOTES
"DOS: 2019  NAME: Neptali Vera   : 1933  PCP: Provider, No Known    Stroke    Subjective: No events overnight. Patient denies any complaitns and/or new concerns on my exam.      Daughter at bedside. Reviewed EEG/ TTE/     Objective:  Vital signs: /61 (BP Location: Left arm, Patient Position: Lying)   Pulse 73   Temp 98.9 °F (37.2 °C) (Oral)   Resp 16   Ht 172.7 cm (68\")   Wt 72.2 kg (159 lb 1.6 oz)   SpO2 95%   BMI 24.19 kg/m²       HEENT: Normocephalic, atraumatic   COR: RRR  Resp: Even and unlabored  Extremities: Equal pulses, non distal embolization    Neurological:   MS: AO. Language normal. No neglect. Higher integrative function normal  CN: II-XII normal  Motor: 5/5, normal tone  Reflexes: Toes downgoing   Sensory: Intact  Coordination: Normal    Laboratory results:  Lab Results   Component Value Date    GLUCOSE 110 (H) 2019    CALCIUM 7.5 (L) 2019     2019    K 3.6 2019    CO2 21.0 (L) 2019     (H) 2019    BUN 25 (H) 2019    CREATININE 1.30 (H) 2019    EGFRIFNONA 52 (L) 2019    BCR 19.2 2019    ANIONGAP 10.0 2019     Lab Results   Component Value Date    WBC 12.06 (H) 2019    HGB 11.7 (L) 2019    HCT 35.1 (L) 2019    .7 (H) 2019     (L) 2019     Lab Results   Component Value Date    CHOL 122 2019     Lab Results   Component Value Date    HDL 48 2019     Lab Results   Component Value Date    LDL 60 2019     Lab Results   Component Value Date    TRIG 69 2019     Lab Results   Component Value Date    TSH 4.420 09/15/2019     Lab Results   Component Value Date    WZVEVUXD80 276 09/15/2019     Lab Results   Component Value Date    HGBA1C 5.68 (H) 2019     Lab Results   Component Value Date    CHOL 122 2019     Lab Results   Component Value Date    TRIG 69 2019     Lab Results   Component Value Date    HDL 48 2019     Lab " Results   Component Value Date    LDL 60 09/16/2019       Review and interpretation of imaging:  Interpretation Summary     · Normal bilateral lower extremity venous duplex scan.        Interpretation Summary     · Left ventricular systolic function is normal. Estimated EF = 55%. Normal left ventricular cavity size and wall thickness noted. All left ventricular wall segments contract normally. Left ventricular diastolic dysfunction is noted (grade I) consistent with impaired relaxation.  · Right ventricular cavity is mildly dilated.  · Saline study suggested probable small degree of right to left sided intra-atrial shunting noted with Valsalva. This was technically difficult..  · Mild aortic valve regurgitation is present.  · Moderate MAC is present. Mild-to-moderate mitral valve regurgitation is present.  · Trace tricuspid valve regurgitation is present. Estimated right ventricular systolic pressure from tricuspid regurgitation is normal (<35 mmHg). Calculated right ventricular systolic pressure from tricuspid regurgitation is 32 mmHg.        Narrative & Impression     HISTORY: History of epilepsy, plans to stop phenytoin  INDICATION: Epilepsy  SEDATION: None  MEDICATIONS: Phenytoin     START DATE/TIME: 9/17/2019 816  STOP DATE/TIME: 9/17/2019 a 45     EEG DESCRIPTION: The study is a 21-channel routine digital EEG captured in the awake and asleep state. The International 10-20 electrode system was used and displayed in referential and bipolar montages. The predominant background rhythm consisted of a 210 Hz low amplitude alpha range activity that was symmetric. Sleep architecture was present with sleep spindles and K complexes. No focal slowing was present. Photic stimulation was performed. Hyperventilation was not performed.  Right anterior temporal epileptiform sharp waves were seen.  Interpretation was not significantly limited by artifact.     ABNORMALITIES:  1.  Right anterior temporal epileptiform sharp  waves        CLINICAL INTERPRETATION: This is a abnormal routine EEG in the awake and asleep state because of occasional right anterior temporal epileptiform sharp waves.  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  No electrographic seizures were seen on this recording.             Last Resulted: 09/17/19 16:48       Impression: This is an 85-year-old gentleman with history of PPM, hypertension and seizures who presented to [facility] on [facility] 15 2019th due to fall after the fall he was noted to have left-sided facial droop and weakness.  On arrival to the emergency room patient he was found to have left sided arm/leg numbness/weakness and left-sided facial weakness along with slowed speech.  NIH SS was 6.  Blood pressure 150s/119.  Initial CT of the head was negative for acute findings specifically no hemorrhage.  CTA revealed distal right M1 occlusion.  He subsequently received rtPA and was transferred to [facility] for possible mechanical thrombectomy.  Upon arrival his CT/CTP were negative.  CTA showed no MCA thrombus with questionable distal LVA occlusion.  His symptoms had resolved.  TTE showed ejection fraction 55%.  LV/LA normal.  Saline study suggestive of small degree of left to right shunting.  Lower extremity duplex was normal no evidence of DVT.  Pacemaker interrogation showed no recent arrhythmias.  Etiology of event unknown although suspicious for cardioembolic source therefore cardiology consulted to evaluate for APRIL. EEG showed evidence of sharp  Epileptiform waves within right anterior temporal lobe; no evidence of seizures. Case reviewed w/ Dr. Solares who feels AEDs not warranted as patient had a since seizure during procedure and has been subtherapeutic since event despite continuous Dilantin use and no evidence of seizure-like activity since admission since discontinuation 09/15. Risks and Benefits discussed w/ Patient/ family at bedside;  both in agreement. Therapies as written. CCP to assist with discharge planning. Call RRT for any acute neurological changes and/or concerns. We will continue to follow and advise.       Plan:  Continue to hold AED   Cardiology to evaluate for APRIL   B-12 replacement (276)   ASA 81mg daily (patient supposed to be on PTA; he admits to stopping x 1 week and taking intermittently) after 1 month increase ASA 325mg daily   Plavix 75mg daily (not on PTA) x30 days then discontinue and continue aspirin alone  Decrease Lipitor 40 mg daily (LDL 60)   Neurochecks  BP control  Stroke Education  JORDY/SCDs  PT/OT/ST  Follow-up w/ me in OP clinic in 3 months     Case reviewed w/ Dr. Solares and he agrees with plan above.     Alondra Mcleod APRN

## 2019-09-18 NOTE — DISCHARGE INSTRUCTIONS
B-12 replacement (276)   ASA 81mg daily x 1 month -after 1 month increase ASA 325mg daily   Plavix 75mg daily x 1 month then discontinue and continue aspirin alone  Lipitor 40 mg daily (LDL 60)   Stop taking Dilantin

## 2019-09-18 NOTE — PLAN OF CARE
Problem: Patient Care Overview  Goal: Plan of Care Review  Outcome: Ongoing (interventions implemented as appropriate)   09/18/19 1124   Coping/Psychosocial   Plan of Care Reviewed With patient   Plan of Care Review   Progress improving   OTHER   Outcome Summary Pt doing well this date. He is agreeable to PT and has no complaints this morning. Increased time spent with pt due to BR needs. Pt tolerated treatment and progressing with ambulation. He demonstrates improved balance with ambulation today. Will continue to progress as tolerated.

## 2019-09-18 NOTE — THERAPY TREATMENT NOTE
Patient Name: Neptali Vera  : 1933    MRN: [id number]                              Today's Date: 2019       Admit Date: 9/15/2019    Visit Dx: No diagnosis found.  Patient Active Problem List   Diagnosis   • Ischemic stroke (CMS/HCC)     Past Medical History:   Diagnosis Date   • Hypertension    • Seizure (CMS/HCC)      No past surgical history on file.  General Information     Row Name 19 1120          PT Evaluation Time/Intention    Document Type  therapy note (daily note)  -EJ     Mode of Treatment  physical therapy  -EJ       User Key  (r) = Recorded By, (t) = Taken By, (c) = Cosigned By    Initials Name Provider Type    EJ Sandra Isbell, PT Physical Therapist        Mobility     Row Name 19 1121          Bed Mobility Assessment/Treatment    Supine-Sit [place] (Bed Mobility)  verbal cues;contact guard  -EJ     Sit-Supine [place] (Bed Mobility)  not tested  -EJ     Row Name 19 1121          Sit-Stand Transfer    Sit-Stand [place] (Transfers)  verbal cues;contact guard  -EJ     Assistive Device (Sit-Stand Transfers)  walker, front-wheeled  -EJ     Row Name 19 1121          Gait/Stairs Assessment/Training    [place] Level (Gait)  verbal cues;contact guard  -EJ     Assistive Device (Gait)  walker, front-wheeled  -EJ     Distance in Feet (Gait)  150  -EJ     Left Sided Gait Deviations  forward flexed posture;heel strike decreased  -EJ     Comment (Gait/Stairs)  improved balance this session  -EJ       User Key  (r) = Recorded By, (t) = Taken By, (c) = Cosigned By    Initials Name Provider Type    EJ Sandra Isbell, PT Physical Therapist        Obj/Interventions    No documentation.       Goals/Plan    No documentation.       Clinical Impression     Row Name 19 1122          Pain Scale: Numbers Pre/Post-Treatment    Pain Scale: Numbers, Pretreatment  0/10 - no pain  -EJ     Pain Scale: Numbers, Post-Treatment  0/10 - no pain  -EJ     Row Name  09/18/19 1122          Plan of Care Review    Plan of Care Reviewed With  patient;daughter  -EJ     Row Name 09/18/19 1122          Vital Signs    O2 Delivery Pre Treatment  room air  -EJ     O2 Delivery Intra Treatment  room air  -EJ     O2 Delivery Post Treatment  room air  -EJ     Pre Patient Position  Supine  -EJ     Intra Patient Position  Standing  -EJ     Post Patient Position  Sitting  -EJ     Row Name 09/18/19 1122          Positioning and Restraints    Pre-Treatment Position  in bed  -EJ     Post Treatment Position  chair  -EJ     In Chair  notified nsg;reclined;call light within reach;encouraged to call for assist;with family/caregiver  -EJ       User Key  (r) = Recorded By, (t) = Taken By, (c) = Cosigned By    Initials Name Provider Type    Sandra Nascimento, PT Physical Therapist        Outcome Measures     Row Name 09/18/19 1126          How much help from another person do you currently need...    Turning from your back to your side while in flat bed without using bedrails?  4  -EJ     Moving from lying on back to sitting on the side of a flat bed without bedrails?  4  -EJ     Moving to and from a bed to a chair (including a wheelchair)?  3  -EJ     Standing up from a chair using your arms (e.g., wheelchair, bedside chair)?  3  -EJ     Climbing 3-5 steps with a railing?  3  -EJ     To walk in hospital room?  3  -EJ     AM-PAC 6 Clicks Score (PT)  20  -EJ       User Key  (r) = Recorded By, (t) = Taken By, (c) = Cosigned By    Initials Name Provider Type    Sandra Nascimento, PT Physical Therapist        Physical Therapy Education     Title: PT OT SLP Therapies (Done)     Topic: Physical Therapy (Done)     Point: Mobility training (Done)     Learning Progress Summary           Patient Acceptance, E,TB,D, VU,NR by DANNY at 9/18/2019 11:23 AM    Acceptance, E,D, DU by FRANCINE at 9/17/2019  1:41 PM    Acceptance, E,D, DU by FRANCINE at 9/16/2019 11:09 AM   Family Acceptance, E,TB,D, VU,NR by DANNY at 9/18/2019  11:23 AM                   Point: Home exercise program (Done)     Learning Progress Summary           Patient Acceptance, E,D, DU by PC at 9/17/2019  1:41 PM    Acceptance, E,D, DU by PC at 9/16/2019 11:09 AM                   Point: Body mechanics (Done)     Learning Progress Summary           Patient Acceptance, E,D, DU by PC at 9/17/2019  1:41 PM    Acceptance, E,D, DU by PC at 9/16/2019 11:09 AM                   Point: Precautions (Done)     Learning Progress Summary           Patient Acceptance, E,D, DU by PC at 9/17/2019  1:41 PM    Acceptance, E,D, DU by PC at 9/16/2019 11:09 AM                               User Key     Initials Effective Dates Name Provider Type Discipline    PC 04/03/18 -  Annalee Reynolds, PT Physical Therapist PT     04/03/18 -  Sandra Isbell, PT Physical Therapist PT              PT Recommendation and Plan     Plan of Care Reviewed With: patient  Progress: improving  Outcome Summary: Pt doing well this date. He is agreeable to PT and has no complaints this morning. Increased time spent with pt due to BR needs. Pt tolerated treatment and progressing with ambulation. He demonstrates improved balance with ambulation today. Will continue to progress as tolerated.     Time Calculation:   PT Charges     Row Name 09/18/19 1127             Time Calculation    Start Time  1050  -EJ      Stop Time  1113  -EJ      Time Calculation (min)  23 min  -EJ      PT Received On  09/18/19  -EJ      PT - Next Appointment  09/19/19  -EJ        User Key  (r) = Recorded By, (t) = Taken By, (c) = Cosigned By    Initials Name Provider Type     Sandra Isbell, PT Physical Therapist        Therapy Charges for Today     Code Description Service Date Service Provider Modifiers Qty    41075325743 HC PT THER PROC EA 15 MIN 9/18/2019 Sandra Isbell, PT GP 2    44273439087 HC PT THER SUPP EA 15 MIN 9/18/2019 Sandra Isbell, PT GP 1          PT G-Codes  Outcome Measure Options: AM-PAC 6 Clicks Daily  Activity (OT)  AM-PAC 6 Clicks Score (PT): 20  AM-PAC 6 Clicks Score (OT): 20  Modified Anderson Scale: 1 - No significant disability despite symptoms.  Able to carry out all usual duties and activities.    Sandra Isbell, PT  9/18/2019

## 2019-09-19 ENCOUNTER — APPOINTMENT (OUTPATIENT)
Dept: CARDIOLOGY | Facility: HOSPITAL | Age: 84
End: 2019-09-19

## 2019-09-19 LAB
ALBUMIN SERPL-MCNC: 2.6 G/DL (ref 3.5–5.2)
ANION GAP SERPL CALCULATED.3IONS-SCNC: 11 MMOL/L (ref 5–15)
BH CV ECHO MEAS - BSA(HAYCOCK): 1.9 M^2
BH CV ECHO MEAS - BSA: 1.9 M^2
BH CV ECHO MEAS - BZI_BMI: 24.3 KILOGRAMS/M^2
BH CV ECHO MEAS - BZI_METRIC_HEIGHT: 172.7 CM
BH CV ECHO MEAS - BZI_METRIC_WEIGHT: 72.6 KG
BUN BLD-MCNC: 22 MG/DL (ref 8–23)
BUN/CREAT SERPL: 17.9 (ref 7–25)
CALCIUM SPEC-SCNC: 7.1 MG/DL (ref 8.6–10.5)
CHLORIDE SERPL-SCNC: 105 MMOL/L (ref 98–107)
CO2 SERPL-SCNC: 20 MMOL/L (ref 22–29)
CREAT BLD-MCNC: 1.23 MG/DL (ref 0.76–1.27)
GFR SERPL CREATININE-BSD FRML MDRD: 56 ML/MIN/1.73
GLUCOSE BLD-MCNC: 108 MG/DL (ref 65–99)
LV EF 2D ECHO EST: 60 %
PHOSPHATE SERPL-MCNC: 2.1 MG/DL (ref 2.5–4.5)
POTASSIUM BLD-SCNC: 3.4 MMOL/L (ref 3.5–5.2)
SODIUM BLD-SCNC: 136 MMOL/L (ref 136–145)

## 2019-09-19 PROCEDURE — 99232 SBSQ HOSP IP/OBS MODERATE 35: CPT | Performed by: NURSE PRACTITIONER

## 2019-09-19 PROCEDURE — 93320 DOPPLER ECHO COMPLETE: CPT | Performed by: INTERNAL MEDICINE

## 2019-09-19 PROCEDURE — 99152 MOD SED SAME PHYS/QHP 5/>YRS: CPT

## 2019-09-19 PROCEDURE — 25010000002 MIDAZOLAM PER 1 MG: Performed by: INTERNAL MEDICINE

## 2019-09-19 PROCEDURE — 93325 DOPPLER ECHO COLOR FLOW MAPG: CPT | Performed by: INTERNAL MEDICINE

## 2019-09-19 PROCEDURE — 80069 RENAL FUNCTION PANEL: CPT | Performed by: INTERNAL MEDICINE

## 2019-09-19 PROCEDURE — 99232 SBSQ HOSP IP/OBS MODERATE 35: CPT | Performed by: INTERNAL MEDICINE

## 2019-09-19 PROCEDURE — 93325 DOPPLER ECHO COLOR FLOW MAPG: CPT

## 2019-09-19 PROCEDURE — 93312 ECHO TRANSESOPHAGEAL: CPT | Performed by: INTERNAL MEDICINE

## 2019-09-19 PROCEDURE — 93312 ECHO TRANSESOPHAGEAL: CPT

## 2019-09-19 PROCEDURE — 25010000002 CYANOCOBALAMIN PER 1000 MCG: Performed by: NURSE PRACTITIONER

## 2019-09-19 PROCEDURE — 93320 DOPPLER ECHO COMPLETE: CPT

## 2019-09-19 PROCEDURE — 25010000002 FENTANYL CITRATE (PF) 100 MCG/2ML SOLUTION: Performed by: INTERNAL MEDICINE

## 2019-09-19 RX ORDER — MIDAZOLAM HYDROCHLORIDE 1 MG/ML
INJECTION INTRAMUSCULAR; INTRAVENOUS
Status: COMPLETED | OUTPATIENT
Start: 2019-09-19 | End: 2019-09-19

## 2019-09-19 RX ORDER — LIDOCAINE HYDROCHLORIDE 20 MG/ML
SOLUTION OROPHARYNGEAL
Status: COMPLETED | OUTPATIENT
Start: 2019-09-19 | End: 2019-09-19

## 2019-09-19 RX ORDER — SODIUM CHLORIDE 9 MG/ML
INJECTION, SOLUTION INTRAVENOUS
Status: COMPLETED | OUTPATIENT
Start: 2019-09-19 | End: 2019-09-19

## 2019-09-19 RX ORDER — FENTANYL CITRATE 50 UG/ML
INJECTION, SOLUTION INTRAMUSCULAR; INTRAVENOUS
Status: COMPLETED | OUTPATIENT
Start: 2019-09-19 | End: 2019-09-19

## 2019-09-19 RX ADMIN — SODIUM CHLORIDE, PRESERVATIVE FREE 10 ML: 5 INJECTION INTRAVENOUS at 20:02

## 2019-09-19 RX ADMIN — FENTANYL CITRATE 25 MCG: 50 INJECTION INTRAMUSCULAR; INTRAVENOUS at 12:57

## 2019-09-19 RX ADMIN — CLOPIDOGREL 75 MG: 75 TABLET, FILM COATED ORAL at 15:39

## 2019-09-19 RX ADMIN — POTASSIUM & SODIUM PHOSPHATES POWDER PACK 280-160-250 MG 2 PACKET: 280-160-250 PACK at 16:59

## 2019-09-19 RX ADMIN — ASPIRIN 81 MG: 81 TABLET, COATED ORAL at 15:39

## 2019-09-19 RX ADMIN — SODIUM CHLORIDE 50 ML/HR: 9 INJECTION, SOLUTION INTRAVENOUS at 12:44

## 2019-09-19 RX ADMIN — MIDAZOLAM 2 MG: 1 INJECTION INTRAMUSCULAR; INTRAVENOUS at 12:56

## 2019-09-19 RX ADMIN — FENTANYL CITRATE 25 MCG: 50 INJECTION INTRAMUSCULAR; INTRAVENOUS at 12:59

## 2019-09-19 RX ADMIN — CYANOCOBALAMIN 1000 MCG: 1000 INJECTION, SOLUTION INTRAMUSCULAR; SUBCUTANEOUS at 16:58

## 2019-09-19 RX ADMIN — ATORVASTATIN CALCIUM 40 MG: 20 TABLET, FILM COATED ORAL at 20:00

## 2019-09-19 RX ADMIN — LIDOCAINE HYDROCHLORIDE 10 ML: 20 SOLUTION ORAL; TOPICAL at 12:44

## 2019-09-19 RX ADMIN — MIDAZOLAM 2 MG: 1 INJECTION INTRAMUSCULAR; INTRAVENOUS at 12:59

## 2019-09-19 RX ADMIN — FAMOTIDINE 20 MG: 20 TABLET, FILM COATED ORAL at 16:58

## 2019-09-19 NOTE — PROGRESS NOTES
"CC: Bradycardia     Interval History: No acute events       Vital Signs  Temp:  [97.7 °F (36.5 °C)-100 °F (37.8 °C)] 98.1 °F (36.7 °C)  Heart Rate:  [58-76] 61  Resp:  [16-18] 16  BP: (101-164)/(57-67) 113/57    Intake/Output Summary (Last 24 hours) at 9/19/2019 0827  Last data filed at 9/18/2019 1621  Gross per 24 hour   Intake --   Output 125 ml   Net -125 ml     Flowsheet Rows      First Filed Value   Admission Height  172.7 cm (68\") Documented at 09/17/2019 1402   Admission Weight  73.5 kg (162 lb 0.6 oz) Documented at 09/15/2019 1015          PHYSICAL EXAM:  General: No acute distress  Resp:NL Rate, unlabored, clear  CV:NL rate and rhythm, NL PMI, Nl S1 and S2, no Murmur, no gallop, no rub, No JVD. Normal pedal pulses  ABD:Nl sounds, no masses or tenderness, nondistended, no guarding or rebound  Neuro: alert,cooperative and oriented  Extr: No edema or cyanosis, moves all extremities      Results Review:    Results from last 7 days   Lab Units 09/19/19  0540   SODIUM mmol/L 136   POTASSIUM mmol/L 3.4*   CHLORIDE mmol/L 105   CO2 mmol/L 20.0*   BUN mg/dL 22   CREATININE mg/dL 1.23   GLUCOSE mg/dL 108*   CALCIUM mg/dL 7.1*     Results from last 7 days   Lab Units 09/15/19  0656   TROPONIN I ng/mL <0.030     Results from last 7 days   Lab Units 09/18/19  0604   WBC 10*3/mm3 12.06*   HEMOGLOBIN g/dL 11.7*   HEMATOCRIT % 35.1*   PLATELETS 10*3/mm3 119*     Results from last 7 days   Lab Units 09/15/19  0656   INR  1.05   APTT seconds 25.6     Results from last 7 days   Lab Units 09/16/19  0529   CHOLESTEROL mg/dL 122         Results from last 7 days   Lab Units 09/16/19  0529   CHOLESTEROL mg/dL 122   TRIGLYCERIDES mg/dL 69   HDL CHOL mg/dL 48   LDL CHOL mg/dL 60     I reviewed the patient's new clinical results.  I personally viewed and interpreted the patient's EKG/Telemetry data        Medication Review:   Meds reviewed         Assessment/Plan  1.  Sick sinus syndrome status post dual-chamber permanent pacemaker " placement.  The pacemaker was interrogated today and operating well with no significant episodes of bradycardia, or arrhythmias.  It has 2 additional years on the battery  -His symptoms probably are not related to underlying arrhythmia or pacemaker malfunction.     2.  Hypertension  Controlled        3.  Right MCA CVA  -Get APRIL today to rule out cardiac source of emboli        Beto Dueñas MD  09/19/19  8:27 AM

## 2019-09-19 NOTE — PLAN OF CARE
Problem: Patient Care Overview  Goal: Plan of Care Review  Outcome: Ongoing (interventions implemented as appropriate)   09/19/19 6891   Coping/Psychosocial   Plan of Care Reviewed With patient   Plan of Care Review   Progress no change   OTHER   Outcome Summary Vitals as charted, NPO this morning for APRIL, no c/o pain, NIH 1, APRIL completed, will replace phosphorus once pt has a diet, will continue to monitor.

## 2019-09-19 NOTE — PROGRESS NOTES
"DOS: 2019  NAME: Neptali Vera   : 1933  PCP: Provider, No Known    Stroke    Subjective: No events overnight. He has no complaints on my exam. Planned APRIL later today.       Family at bedside.     Objective:  Vital signs: /57 (BP Location: Left arm, Patient Position: Lying)   Pulse 61   Temp 98.1 °F (36.7 °C) (Oral)   Resp 16   Ht 172.7 cm (68\")   Wt 72.7 kg (160 lb 3.2 oz)   SpO2 94%   BMI 24.36 kg/m²       HEENT: Normocephalic, atraumatic   COR: RRR  Resp: Even and unlabored  Extremities: Equal pulses, non distal embolization    Neurological:   MS: AO. Language normal. No neglect. Higher integrative function normal  CN: II-XII normal  Motor: 5/5, normal tone  Reflexes: Toes downgoing   Sensory: Intact  Coordination: Normal    Laboratory results:  Lab Results   Component Value Date    GLUCOSE 108 (H) 2019    CALCIUM 7.1 (L) 2019     2019    K 3.4 (L) 2019    CO2 20.0 (L) 2019     2019    BUN 22 2019    CREATININE 1.23 2019    EGFRIFNONA 56 (L) 2019    BCR 17.9 2019    ANIONGAP 11.0 2019     Lab Results   Component Value Date    WBC 12.06 (H) 2019    HGB 11.7 (L) 2019    HCT 35.1 (L) 2019    .7 (H) 2019     (L) 2019     Lab Results   Component Value Date    CHOL 122 2019     Lab Results   Component Value Date    HDL 48 2019     Lab Results   Component Value Date    LDL 60 2019     Lab Results   Component Value Date    TRIG 69 2019     Lab Results   Component Value Date    TSH 4.420 09/15/2019     Lab Results   Component Value Date    XLBZZLFK08 276 09/15/2019     Lab Results   Component Value Date    HGBA1C 5.68 (H) 2019     Lab Results   Component Value Date    CHOL 122 2019     Lab Results   Component Value Date    TRIG 69 2019     Lab Results   Component Value Date    HDL 48 2019     Lab Results   Component Value Date    " LDL 60 09/16/2019       Review and interpretation of imaging:  Interpretation Summary     · Normal bilateral lower extremity venous duplex scan.        Interpretation Summary     · Left ventricular systolic function is normal. Estimated EF = 55%. Normal left ventricular cavity size and wall thickness noted. All left ventricular wall segments contract normally. Left ventricular diastolic dysfunction is noted (grade I) consistent with impaired relaxation.  · Right ventricular cavity is mildly dilated.  · Saline study suggested probable small degree of right to left sided intra-atrial shunting noted with Valsalva. This was technically difficult..  · Mild aortic valve regurgitation is present.  · Moderate MAC is present. Mild-to-moderate mitral valve regurgitation is present.  · Trace tricuspid valve regurgitation is present. Estimated right ventricular systolic pressure from tricuspid regurgitation is normal (<35 mmHg). Calculated right ventricular systolic pressure from tricuspid regurgitation is 32 mmHg.        Narrative & Impression     HISTORY: History of epilepsy, plans to stop phenytoin  INDICATION: Epilepsy  SEDATION: None  MEDICATIONS: Phenytoin     START DATE/TIME: 9/17/2019 816  STOP DATE/TIME: 9/17/2019 a 45     EEG DESCRIPTION: The study is a 21-channel routine digital EEG captured in the awake and asleep state. The International 10-20 electrode system was used and displayed in referential and bipolar montages. The predominant background rhythm consisted of a 210 Hz low amplitude alpha range activity that was symmetric. Sleep architecture was present with sleep spindles and K complexes. No focal slowing was present. Photic stimulation was performed. Hyperventilation was not performed.  Right anterior temporal epileptiform sharp waves were seen.  Interpretation was not significantly limited by artifact.     ABNORMALITIES:  1.  Right anterior temporal epileptiform sharp waves        CLINICAL INTERPRETATION:  This is a abnormal routine EEG in the awake and asleep state because of occasional right anterior temporal epileptiform sharp waves.  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  No electrographic seizures were seen on this recording.             Last Resulted: 09/17/19 16:48       Impression: This is an 85-year-old gentleman with history of PPM, hypertension and seizures who presented to [facility] on September 15 2019th due to fall after the fall he was noted to have left-sided facial droop and weakness.  On arrival to the emergency room patient he was found to have left sided arm/leg numbness/weakness and left-sided facial weakness along with slowed speech.  NIH SS was 6.  Blood pressure 150s/119.  Initial CT of the head was negative for acute findings specifically no hemorrhage.  CTA revealed distal right M1 occlusion.  He subsequently received rtPA and was transferred to [facility] for possible mechanical thrombectomy.  Upon arrival his CT/CTP were negative.  CTA showed no MCA thrombus with questionable distal LVA occlusion.  His symptoms had resolved.  TTE showed ejection fraction 55%.  LV/LA normal.  Saline study suggestive of small degree of left to right shunting.  Lower extremity duplex was normal no evidence of DVT.  Pacemaker interrogation showed no recent arrhythmias.  Etiology of event unknown although suspicious for cardioembolic source therefore cardiology consulted to evaluate for APRIL. EEG showed evidence of sharp  Epileptiform waves within right anterior temporal lobe; no evidence of seizures. Recommend no AEDs moving forward d/t isolated episode and lack of seizure-like activity w/ subtherapeutic Dilantin level.     Neurologically unchanged. Will awat APRIL and advise further thereafter. Therapies as written. CCP to assist with discharge planning. Call RRT for any acute neurological changes and/or concerns. We will continue to follow and advise.        Plan:  Continue to hold AED/Dilantin    APRIL (pending)   B-12 replacement (276)   ASA 81mg daily (patient supposed to be on PTA; he admits to stopping x 1 week and taking intermittently) after 1 month increase ASA 325mg daily   Plavix 75mg daily (not on PTA) x30 days then discontinue and continue aspirin alone  Decrease Lipitor 40 mg daily (LDL 60)   Neurochecks  BP control  Stroke Education  JORDY/SCDs  PT/OT/ST  Follow-up w/ me in OP clinic in 3 months or Neurologist that he is already established with.       Case reviewed w/ Dr. Solares 09/19 and he agrees with plan above.     Alondra Mcleod APRN

## 2019-09-19 NOTE — PROGRESS NOTES
LOS: 4 days   Patient Care Team:  Provider, No Known as PCP - General    Chief Complaint:  F/up stroke post TPA    Subjective   Interval History  No new complaint.  He had a APRIL today.  He denies weakness or numbness.    REVIEW OF SYSTEMS:   CARDIOVASCULAR: No chest pain, chest pressure or chest discomfort. No palpitations or edema.   RESPIRATORY: No shortness of breath, cough or sputum.   GASTROINTESTINAL: No anorexia, nausea, or diarrhea. No abdominal pain or blood.   HEMATOLOGIC: No bleeding or bruising.     Ventilator/Non-Invasive Ventilation Settings (From admission, onward)    None                Physical Exam:     Vital Signs  Temp:  [97.6 °F (36.4 °C)-100 °F (37.8 °C)] 97.6 °F (36.4 °C)  Heart Rate:  [58-76] 60  Resp:  [14-18] 16  BP: (101-135)/(46-76) 131/67    Intake/Output Summary (Last 24 hours) at 9/19/2019 1758  Last data filed at 9/19/2019 0843  Gross per 24 hour   Intake --   Output 160 ml   Net -160 ml     Flowsheet Rows      First Filed Value   Admission Height  --   Admission Weight  73.5 kg (162 lb 0.6 oz) Documented at 09/15/2019 1015          General Appearance:    Alert, cooperative, in no acute distress   ENMT:  Mallampati score 3, moist mucous membrane   Eyes: Pupils equals and reactive to light. EOMI   Neck:   Trachea midline. No thyromegaly.   Lungs:     Clear to auscultation,respirations regular, even and                  unlabored    Heart:    Regular rhythm and normal rate, normal S1 and S2, no            murmur   Skin:    No abnormalities observed   Abdomen:     Soft. No tenderness. No HSM.   Neuro:   Conscious, alert, oriented x3. Strength 5/5 in upper and lower ext   Extremities:   Moves all extremities well, no edema, no cyanosis, no             Redness          Results Review:        Results from last 7 days   Lab Units 09/19/19  0540 09/18/19  0604 09/17/19  0418   SODIUM mmol/L 136 140 138   POTASSIUM mmol/L 3.4* 3.6 3.8   CHLORIDE  mmol/L 105 109* 103   CO2 mmol/L 20.0* 21.0* 22.3   BUN mg/dL 22 25* 18   CREATININE mg/dL 1.23 1.30* 1.04   GLUCOSE mg/dL 108* 110* 104*   CALCIUM mg/dL 7.1* 7.5* 7.9*     Results from last 7 days   Lab Units 09/15/19  0656   TROPONIN I ng/mL <0.030     Results from last 7 days   Lab Units 09/18/19  0604 09/16/19  0529 09/15/19  0656   WBC 10*3/mm3 12.06* 6.96 5.70   HEMOGLOBIN g/dL 11.7* 11.8* 14.0   HEMATOCRIT % 35.1* 35.3* 41.0   PLATELETS 10*3/mm3 119* 142 160     Results from last 7 days   Lab Units 09/15/19  0656   INR  1.05   APTT seconds 25.6           I reviewed the patient's new clinical results.  I personally viewed and interpreted the patient's CXR        Medication Review:     aspirin 81 mg Oral Daily   atorvastatin 40 mg Oral Nightly   clopidogrel 75 mg Oral Daily   cyanocobalamin 1,000 mcg Intramuscular Daily   Followed by      [START ON 9/21/2019] vitamin B-12 1,000 mcg Oral Daily   famotidine 20 mg Oral BID AC   sodium chloride 10 mL Intravenous Q12H           Assessment   1. RMCA ischemic stroke s/p TPA 9/15/2019  2. 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 MICHELA, improved with IV hydration  4. Essential hypertension  5. Dyslipidemia  6. Sick sinus syndrome s/p pacemaker  7. Urinary frequency and dysuria  8. Microscopic hematuria  9. History of seizure in 2009  10. Hypophosphatemia  11. Anemia  12. Mild to moderate MR  13. Possible cardiac shunt  14. Mild leukocytosis, likely stress-induced  15. Hypokalemia, NEW        Plan   · Aspirin 81 then increased to 325 after 1 month.  · Plavix for 1 month only  · Vitamin B12 IM  · Replace potassium.  Check BMP in a.m.  · Awaiting results of APRIL to determine whether he would need anticoagulation.    Disposition: Likely home tomorrow      Aniceto Dexter MD  09/19/19  5:59 PM        This note was dictated utilizing Pixifly dictation

## 2019-09-19 NOTE — PROGRESS NOTES
LOS: 3 days   Patient Care Team:  Provider, No Known as PCP - General    Chief Complaint:  F/up stroke post TPA, ICU management and medical problems listed below    Subjective   Interval History  Doppler of the lower extremities is normal.  Patient denies headache today, no weakness or numbness.    REVIEW OF SYSTEMS:   CARDIOVASCULAR: No chest pain, chest pressure or chest discomfort. No palpitations or edema.   RESPIRATORY: No shortness of breath, cough or sputum.   GASTROINTESTINAL: No anorexia, nausea, or diarrhea. No abdominal pain or blood.   HEMATOLOGIC: No bleeding or bruising.     Ventilator/Non-Invasive Ventilation Settings (From admission, onward)    None                Physical Exam:     Vital Signs  Temp:  [97.6 °F (36.4 °C)-99.3 °F (37.4 °C)] 99.3 °F (37.4 °C)  Heart Rate:  [60-76] 60  Resp:  [16-18] 16  BP: (103-164)/(55-67) 127/64    Intake/Output Summary (Last 24 hours) at 9/18/2019 2038  Last data filed at 9/18/2019 1621  Gross per 24 hour   Intake 120 ml   Output 400 ml   Net -280 ml     Flowsheet Rows      First Filed Value   Admission Height  --   Admission Weight  73.5 kg (162 lb 0.6 oz) Documented at 09/15/2019 1015          General Appearance:    Alert, cooperative, in no acute distress   ENMT:  Mallampati score 3, moist mucous membrane   Eyes: Pupils equals and reactive to light. EOMI   Neck:   Trachea midline. No thyromegaly.   Lungs:     Clear to auscultation,respirations regular, even and                  unlabored    Heart:    Regular rhythm and normal rate, normal S1 and S2, no            murmur   Skin:    No abnormalities observed   Abdomen:     Soft. No tenderness. No HSM.   Neuro:   Conscious, alert, oriented x3. Strength 5/5 in upper and lower ext   Extremities:   Moves all extremities well, no edema, no cyanosis, no             Redness          Results Review:        Results from last 7 days   Lab Units 09/18/19  0604 09/17/19  1567  09/16/19  0529   SODIUM mmol/L 140 138 139   POTASSIUM mmol/L 3.6 3.8 3.7   CHLORIDE mmol/L 109* 103 105   CO2 mmol/L 21.0* 22.3 22.8   BUN mg/dL 25* 18 20   CREATININE mg/dL 1.30* 1.04 1.16   GLUCOSE mg/dL 110* 104* 99   CALCIUM mg/dL 7.5* 7.9* 8.2*     Results from last 7 days   Lab Units 09/15/19  0656   TROPONIN I ng/mL <0.030     Results from last 7 days   Lab Units 09/18/19  0604 09/16/19  0529 09/15/19  0656   WBC 10*3/mm3 12.06* 6.96 5.70   HEMOGLOBIN g/dL 11.7* 11.8* 14.0   HEMATOCRIT % 35.1* 35.3* 41.0   PLATELETS 10*3/mm3 119* 142 160     Results from last 7 days   Lab Units 09/15/19  0656   INR  1.05   APTT seconds 25.6           I reviewed the patient's new clinical results.  I personally viewed and interpreted the patient's CXR        Medication Review:     aspirin 81 mg Oral Daily   atorvastatin 40 mg Oral Nightly   clopidogrel 75 mg Oral Daily   cyanocobalamin 1,000 mcg Intramuscular Daily   Followed by      [START ON 9/21/2019] vitamin B-12 1,000 mcg Oral Daily   famotidine 20 mg Oral BID AC   sodium chloride 10 mL Intravenous Q12H           Assessment   1. RMCA ischemic stroke s/p TPA 9/15/2019  2. 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 MICHELA, improved with IV hydration  4. Essential hypertension  5. Dyslipidemia  6. Sick sinus syndrome s/p pacemaker  7. Urinary frequency and dysuria  8. Microscopic hematuria  9. History of seizure in 2009  10. Hypophosphatemia, NEW  11. Anemia  12. Mild to moderate MR  13. Possible cardiac shunt  14. Mild leukocytosis, likely stress-induced        Plan   · Aspirin 81 then increased to 325 after 1 month.  · Plavix for 1 month only  · Vitamin B12 IM  · APRIL per cardiology tomorrow.  · Zofran as needed for vomiting  · Replace phosphorus and repeat level in a.m.  · CBC and BMP in a.m.    Disposition: Home after APRIL.  Follow-up with neurology in 3 months.      Aniceto Dexter MD  09/18/19  8:38 PM        This note was dictated utilizing Dragon dictation

## 2019-09-19 NOTE — PLAN OF CARE
Problem: Patient Care Overview  Goal: Plan of Care Review  Outcome: Ongoing (interventions implemented as appropriate)   09/19/19 4934   Coping/Psychosocial   Plan of Care Reviewed With patient   Plan of Care Review   Progress improving   OTHER   Outcome Summary Several loose stools during the night, NPO for APRIL today. Normal sinus on the monitor, pt denies chest pain or soa. NIH score 1       Problem: Stroke (Ischemic) (Adult)  Goal: Signs and Symptoms of Listed Potential Problems Will be Absent, Minimized or Managed (Stroke)  Outcome: Ongoing (interventions implemented as appropriate)   09/19/19 0135   Goal/Outcome Evaluation   Problems Assessed (Stroke (Ischemic)) cognitive impairment

## 2019-09-20 VITALS
TEMPERATURE: 97.8 F | WEIGHT: 155.4 LBS | OXYGEN SATURATION: 96 % | HEIGHT: 68 IN | SYSTOLIC BLOOD PRESSURE: 148 MMHG | DIASTOLIC BLOOD PRESSURE: 71 MMHG | BODY MASS INDEX: 23.55 KG/M2 | HEART RATE: 65 BPM | RESPIRATION RATE: 18 BRPM

## 2019-09-20 LAB
ALBUMIN SERPL-MCNC: 3.1 G/DL (ref 3.5–5.2)
ANION GAP SERPL CALCULATED.3IONS-SCNC: 12.3 MMOL/L (ref 5–15)
BASOPHILS # BLD AUTO: 0.06 10*3/MM3 (ref 0–0.2)
BASOPHILS NFR BLD AUTO: 0.9 % (ref 0–1.5)
BUN BLD-MCNC: 22 MG/DL (ref 8–23)
BUN/CREAT SERPL: 16.9 (ref 7–25)
CALCIUM SPEC-SCNC: 7.9 MG/DL (ref 8.6–10.5)
CHLORIDE SERPL-SCNC: 104 MMOL/L (ref 98–107)
CO2 SERPL-SCNC: 22.7 MMOL/L (ref 22–29)
CREAT BLD-MCNC: 1.3 MG/DL (ref 0.76–1.27)
DEPRECATED RDW RBC AUTO: 43.8 FL (ref 37–54)
EOSINOPHIL # BLD AUTO: 0.31 10*3/MM3 (ref 0–0.4)
EOSINOPHIL NFR BLD AUTO: 4.4 % (ref 0.3–6.2)
ERYTHROCYTE [DISTWIDTH] IN BLOOD BY AUTOMATED COUNT: 12.5 % (ref 12.3–15.4)
GFR SERPL CREATININE-BSD FRML MDRD: 52 ML/MIN/1.73
GLUCOSE BLD-MCNC: 111 MG/DL (ref 65–99)
HCT VFR BLD AUTO: 33.6 % (ref 37.5–51)
HGB BLD-MCNC: 11.6 G/DL (ref 13–17.7)
IMM GRANULOCYTES # BLD AUTO: 0.04 10*3/MM3 (ref 0–0.05)
IMM GRANULOCYTES NFR BLD AUTO: 0.6 % (ref 0–0.5)
LYMPHOCYTES # BLD AUTO: 1.22 10*3/MM3 (ref 0.7–3.1)
LYMPHOCYTES NFR BLD AUTO: 17.4 % (ref 19.6–45.3)
MCH RBC QN AUTO: 33.6 PG (ref 26.6–33)
MCHC RBC AUTO-ENTMCNC: 34.5 G/DL (ref 31.5–35.7)
MCV RBC AUTO: 97.4 FL (ref 79–97)
MONOCYTES # BLD AUTO: 0.91 10*3/MM3 (ref 0.1–0.9)
MONOCYTES NFR BLD AUTO: 13 % (ref 5–12)
NEUTROPHILS # BLD AUTO: 4.46 10*3/MM3 (ref 1.7–7)
NEUTROPHILS NFR BLD AUTO: 63.7 % (ref 42.7–76)
NRBC BLD AUTO-RTO: 0 /100 WBC (ref 0–0.2)
PHOSPHATE SERPL-MCNC: 1.9 MG/DL (ref 2.5–4.5)
PHOSPHATE SERPL-MCNC: 2.2 MG/DL (ref 2.5–4.5)
PLATELET # BLD AUTO: 137 10*3/MM3 (ref 140–450)
PMV BLD AUTO: 10 FL (ref 6–12)
POTASSIUM BLD-SCNC: 3.7 MMOL/L (ref 3.5–5.2)
RBC # BLD AUTO: 3.45 10*6/MM3 (ref 4.14–5.8)
SODIUM BLD-SCNC: 139 MMOL/L (ref 136–145)
WBC NRBC COR # BLD: 7 10*3/MM3 (ref 3.4–10.8)

## 2019-09-20 PROCEDURE — 80069 RENAL FUNCTION PANEL: CPT | Performed by: INTERNAL MEDICINE

## 2019-09-20 PROCEDURE — 84100 ASSAY OF PHOSPHORUS: CPT | Performed by: INTERNAL MEDICINE

## 2019-09-20 PROCEDURE — 99232 SBSQ HOSP IP/OBS MODERATE 35: CPT | Performed by: NURSE PRACTITIONER

## 2019-09-20 PROCEDURE — 25010000002 CYANOCOBALAMIN PER 1000 MCG: Performed by: NURSE PRACTITIONER

## 2019-09-20 PROCEDURE — 97110 THERAPEUTIC EXERCISES: CPT

## 2019-09-20 PROCEDURE — 85025 COMPLETE CBC W/AUTO DIFF WBC: CPT | Performed by: INTERNAL MEDICINE

## 2019-09-20 RX ORDER — FAMOTIDINE 20 MG/1
20 TABLET, FILM COATED ORAL
Qty: 60 TABLET | Refills: 0 | Status: ON HOLD | OUTPATIENT
Start: 2019-09-20 | End: 2022-01-01

## 2019-09-20 RX ORDER — LOPERAMIDE HYDROCHLORIDE 2 MG/1
2 CAPSULE ORAL 4 TIMES DAILY PRN
Status: DISCONTINUED | OUTPATIENT
Start: 2019-09-20 | End: 2019-09-20 | Stop reason: HOSPADM

## 2019-09-20 RX ORDER — CLOPIDOGREL BISULFATE 75 MG/1
75 TABLET ORAL DAILY
Qty: 30 TABLET | Refills: 0 | Status: SHIPPED | OUTPATIENT
Start: 2019-09-21 | End: 2019-11-06

## 2019-09-20 RX ORDER — ATORVASTATIN CALCIUM 40 MG/1
40 TABLET, FILM COATED ORAL NIGHTLY
Qty: 30 TABLET | Refills: 0 | Status: SHIPPED | OUTPATIENT
Start: 2019-09-20 | End: 2019-11-06 | Stop reason: DRUGHIGH

## 2019-09-20 RX ADMIN — POTASSIUM & SODIUM PHOSPHATES POWDER PACK 280-160-250 MG 2 PACKET: 280-160-250 PACK at 14:13

## 2019-09-20 RX ADMIN — SODIUM CHLORIDE, PRESERVATIVE FREE 10 ML: 5 INJECTION INTRAVENOUS at 08:02

## 2019-09-20 RX ADMIN — FAMOTIDINE 20 MG: 20 TABLET, FILM COATED ORAL at 08:01

## 2019-09-20 RX ADMIN — ASPIRIN 81 MG: 81 TABLET, COATED ORAL at 08:01

## 2019-09-20 RX ADMIN — CYANOCOBALAMIN 1000 MCG: 1000 INJECTION, SOLUTION INTRAMUSCULAR; SUBCUTANEOUS at 08:01

## 2019-09-20 RX ADMIN — CLOPIDOGREL 75 MG: 75 TABLET, FILM COATED ORAL at 08:01

## 2019-09-20 NOTE — PLAN OF CARE
Problem: Patient Care Overview  Goal: Plan of Care Review   09/20/19 0510   Coping/Psychosocial   Plan of Care Reviewed With patient   Plan of Care Review   Progress improving   OTHER   Outcome Summary Pt has been awake most of the night. Family and pt are hoping to be discharged today, however pt feels he needs a cane to help him ambulate at home.       Problem: Stroke (Ischemic) (Adult)  Goal: Signs and Symptoms of Listed Potential Problems Will be Absent, Minimized or Managed (Stroke)  Outcome: Ongoing (interventions implemented as appropriate)   09/20/19 0510   Goal/Outcome Evaluation   Problems Assessed (Stroke (Ischemic)) motor/sensory impairment

## 2019-09-20 NOTE — THERAPY TREATMENT NOTE
Patient Name: Neptali Vera  : 1933    MRN: [id number]                              Today's Date: 2019       Admit Date: 9/15/2019    Visit Dx: No diagnosis found.  Patient Active Problem List   Diagnosis   • Ischemic stroke (CMS/HCC)     Past Medical History:   Diagnosis Date   • Hypertension    • Seizure (CMS/HCC)      No past surgical history on file.  General Information     Row Name 19 1149          PT Evaluation Time/Intention    Document Type  therapy note (daily note)  -     Mode of Treatment  physical therapy  -     Row Name 19 1149          General Information    Existing Precautions/Restrictions  fall  -     Row Name 19 1149          Cognitive Assessment/Intervention- PT/OT    Orientation Status (Cognition)  oriented x 3  -SM       User Key  (r) = Recorded By, (t) = Taken By, (c) = Cosigned By    Initials Name Provider Type     Niru Vera PTA Physical Therapy Assistant        Mobility     Row Name 19 1149          Bed Mobility Assessment/Treatment    Supine-Sit Chandler (Bed Mobility)  supervision  -     Sit-Supine Chandler (Bed Mobility)  supervision  -     Assistive Device (Bed Mobility)  bed rails;head of bed elevated  -     Row Name 19 1149          Sit-Stand Transfer    Sit-Stand Chandler (Transfers)  contact guard  -     Assistive Device (Sit-Stand Transfers)  walker, front-wheeled  -     Row Name 19 1149          Gait/Stairs Assessment/Training    Chandler Level (Gait)  contact guard  -SM     Assistive Device (Gait)  walker, front-wheeled  -     Distance in Feet (Gait)  150  -SM     Pattern (Gait)  step-through  -SM     Deviations/Abnormal Patterns (Gait)  rivka decreased;stride length decreased  -SM     Bilateral Gait Deviations  forward flexed posture  -SM     Comment (Gait/Stairs)  cues to keep head up  -       User Key  (r) = Recorded By, (t) = Taken By, (c) = Cosigned By    Initials Name Provider  Type    Niru Madera PTA Physical Therapy Assistant        Obj/Interventions    No documentation.       Goals/Plan    No documentation.       Clinical Impression     Row Name 09/20/19 1150          Pain Assessment    Additional Documentation  Pain Scale: Numbers Pre/Post-Treatment (Group)  -[facility] Name 09/20/19 1150          Pain Scale: Numbers Pre/Post-Treatment    Pain Scale: Numbers, Pretreatment  0/10 - no pain  -     Pain Scale: Numbers, Post-Treatment  0/10 - no pain  -[facility] Name 09/20/19 1150          Positioning and Restraints    Pre-Treatment Position  in bed  -     Post Treatment Position  bed  -SM     In Bed  supine;call light within reach;encouraged to call for assist;exit alarm on  -       User Key  (r) = Recorded By, (t) = Taken By, (c) = Cosigned By    Initials Name Provider Type    Niru Madera PTA Physical Therapy Assistant        Outcome Measures     Row Name 09/20/19 1152          How much help from another person do you currently need...    Turning from your back to your side while in flat bed without using bedrails?  3  -SM     Moving from lying on back to sitting on the side of a flat bed without bedrails?  3  -SM     Moving to and from a bed to a chair (including a wheelchair)?  3  -SM     Standing up from a chair using your arms (e.g., wheelchair, bedside chair)?  3  -SM     Climbing 3-5 steps with a railing?  3  -SM     To walk in hospital room?  3  -SM     AM-PAC 6 Clicks Score (PT)  18  -[facility] Name 09/20/19 1152          Functional Assessment    Outcome Measure Options  AM-PAC 6 Clicks Basic Mobility (PT)  -       User Key  (r) = Recorded By, (t) = Taken By, (c) = Cosigned By    Initials Name Provider Type    Niru Madera PTA Physical Therapy Assistant        Physical Therapy Education     Title: PT OT SLP Therapies (Done)     Topic: Physical Therapy (Done)     Point: Mobility training (Done)     Learning Progress Summary            Patient Acceptance, E,TB,D, VU,NR by  at 9/20/2019 11:50 AM    Acceptance, E,TB,D, VU,NR by  at 9/18/2019 11:23 AM    Acceptance, E,D, DU by PC at 9/17/2019  1:41 PM    Acceptance, E,D, DU by PC at 9/16/2019 11:09 AM   Family Acceptance, E,TB,D, VU,NR by  at 9/18/2019 11:23 AM                   Point: Home exercise program (Done)     Learning Progress Summary           Patient Acceptance, E,TB,D, VU,NR by  at 9/20/2019 11:50 AM    Acceptance, E,D, DU by PC at 9/17/2019  1:41 PM    Acceptance, E,D, DU by PC at 9/16/2019 11:09 AM                   Point: Body mechanics (Done)     Learning Progress Summary           Patient Acceptance, E,TB,D, VU,NR by  at 9/20/2019 11:50 AM    Acceptance, E,D, DU by PC at 9/17/2019  1:41 PM    Acceptance, E,D, DU by PC at 9/16/2019 11:09 AM                   Point: Precautions (Done)     Learning Progress Summary           Patient Acceptance, E,TB,D, VU,NR by  at 9/20/2019 11:50 AM    Acceptance, E,D, DU by PC at 9/17/2019  1:41 PM    Acceptance, E,D, DU by PC at 9/16/2019 11:09 AM                               User Key     Initials Effective Dates Name Provider Type Discipline     04/03/18 -  Annalee Reynolds, PT Physical Therapist PT     04/03/18 -  Sandra Isbell, PT Physical Therapist PT     03/07/18 -  Niru Vera, ZENAIDA Physical Therapy Assistant PT              PT Recommendation and Plan     Outcome Summary/Treatment Plan (PT)  Anticipated Discharge Disposition (PT): home with assist, home with home health  Plan of Care Reviewed With: patient  Progress: improving  Outcome Summary: Pt tolerated treatment well this date. Ambulated 150ft w/ Rw and CGA. No unsteadiness noted, though cues given to keep head up. PT will continue to address functional mobility deficits as tolerated.     Time Calculation:   PT Charges     Row Name 09/20/19 1152             Time Calculation    Start Time  1048  -      Stop Time  1104  -      Time Calculation (min)  16  min  -SM      PT Received On  09/20/19  -GISELLE      PT - Next Appointment  09/21/19  -GISELLE        User Key  (r) = Recorded By, (t) = Taken By, (c) = Cosigned By    Initials Name Provider Type    Niru Madera PTA Physical Therapy Assistant        Therapy Charges for Today     Code Description Service Date Service Provider Modifiers Qty    93833162295 HC PT THER PROC EA 15 MIN 9/20/2019 Niru Vera PTA GP 1          PT G-Codes  Outcome Measure Options: AM-PAC 6 Clicks Basic Mobility (PT)  AM-PAC 6 Clicks Score (PT): 18  AM-PAC 6 Clicks Score (OT): 20  Modified Radha Scale: 1 - No significant disability despite symptoms.  Able to carry out all usual duties and activities.    Niru Vera PTA  9/20/2019

## 2019-09-20 NOTE — PLAN OF CARE
Problem: Patient Care Overview  Goal: Plan of Care Review  Outcome: Ongoing (interventions implemented as appropriate)   09/20/19 1417   Coping/Psychosocial   Plan of Care Reviewed With patient   Plan of Care Review   Progress improving   OTHER   Outcome Summary Vitals as charted, no c/o pain, phosphorus replaced, imodium ordered for loose stools, pt states he has 4 adults at home to help him and he does not need home health, plan to discharge today, will continue to monitor.

## 2019-09-20 NOTE — PLAN OF CARE
Problem: Patient Care Overview  Goal: Plan of Care Review  Outcome: Ongoing (interventions implemented as appropriate)   09/20/19 1151   Coping/Psychosocial   Plan of Care Reviewed With patient   Plan of Care Review   Progress improving   OTHER   Outcome Summary Pt tolerated treatment well this date. Ambulated 150ft w/ Rw and CGA. No unsteadiness noted, though cues given to keep head up. PT will continue to address functional mobility deficits as tolerated.

## 2019-09-20 NOTE — PROGRESS NOTES
"DOS: 2019  NAME: Neptali Vera   : 1933  PCP: Provider, No Known    Stroke    Subjective: No events overnight. APRIL completed yesterday; tolerated w/o any issues.     Objective:  Vital signs: /60 (BP Location: Left arm, Patient Position: Lying)   Pulse 62   Temp 98.1 °F (36.7 °C) (Oral)   Resp 18   Ht 172.7 cm (68\")   Wt 70.5 kg (155 lb 6.4 oz)   SpO2 95%   BMI 23.63 kg/m²       HEENT: Normocephalic, atraumatic   COR: RRR  Resp: Even and unlabored  Extremities: Equal pulses, non distal embolization    Neurological:   MS: AO. Language normal. No neglect. Higher integrative function normal  CN: II-XII normal  Motor: 5/5, normal tone  Reflexes: Toes downgoing   Sensory: Intact  Coordination: Normal    Laboratory results:  Lab Results   Component Value Date    GLUCOSE 111 (H) 2019    CALCIUM 7.9 (L) 2019     2019    K 3.7 2019    CO2 22.7 2019     2019    BUN 22 2019    CREATININE 1.30 (H) 2019    EGFRIFNONA 52 (L) 2019    BCR 16.9 2019    ANIONGAP 12.3 2019     Lab Results   Component Value Date    WBC 7.00 2019    HGB 11.6 (L) 2019    HCT 33.6 (L) 2019    MCV 97.4 (H) 2019     (L) 2019     Lab Results   Component Value Date    CHOL 122 2019     Lab Results   Component Value Date    HDL 48 2019     Lab Results   Component Value Date    LDL 60 2019     Lab Results   Component Value Date    TRIG 69 2019     Lab Results   Component Value Date    TSH 4.420 09/15/2019     Lab Results   Component Value Date    [id number] 276 09/15/2019     Lab Results   Component Value Date    HGBA1C 5.68 (H) 2019     Lab Results   Component Value Date    CHOL 122 2019     Lab Results   Component Value Date    TRIG 69 2019     Lab Results   Component Value Date    HDL 48 2019     Lab Results   Component Value Date    LDL 60 2019       Review and " interpretation of imaging:  Interpretation Summary     · Normal bilateral lower extremity venous duplex scan.        Interpretation Summary     · Left ventricular systolic function is normal. Estimated EF = 55%. Normal left ventricular cavity size and wall thickness noted. All left ventricular wall segments contract normally. Left ventricular diastolic dysfunction is noted (grade I) consistent with impaired relaxation.  · Right ventricular cavity is mildly dilated.  · Saline study suggested probable small degree of right to left sided intra-atrial shunting noted with Valsalva. This was technically difficult..  · Mild aortic valve regurgitation is present.  · Moderate MAC is present. Mild-to-moderate mitral valve regurgitation is present.  · Trace tricuspid valve regurgitation is present. Estimated right ventricular systolic pressure from tricuspid regurgitation is normal (<35 mmHg). Calculated right ventricular systolic pressure from tricuspid regurgitation is 32 mmHg.        Narrative & Impression     HISTORY: History of epilepsy, plans to stop phenytoin  INDICATION: Epilepsy  SEDATION: None  MEDICATIONS: Phenytoin     START DATE/TIME: 9/17/2019 816  STOP DATE/TIME: 9/17/2019 a 45     EEG DESCRIPTION: The study is a 21-channel routine digital EEG captured in the awake and asleep state. The International 10-20 electrode system was used and displayed in referential and bipolar montages. The predominant background rhythm consisted of a 210 Hz low amplitude alpha range activity that was symmetric. Sleep architecture was present with sleep spindles and K complexes. No focal slowing was present. Photic stimulation was performed. Hyperventilation was not performed.  Right anterior temporal epileptiform sharp waves were seen.  Interpretation was not significantly limited by artifact.     ABNORMALITIES:  1.  Right anterior temporal epileptiform sharp waves        CLINICAL INTERPRETATION: This is a abnormal routine EEG in  the awake and asleep state because of occasional right anterior temporal epileptiform sharp waves.  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  No electrographic seizures were seen on this recording.             Last Resulted: 09/17/19 16:48       Impression: This is an 85-year-old gentleman with history of PPM, hypertension and seizures who presented to [facility] on September 15 2019th due to fall after the fall he was noted to have left-sided facial droop and weakness.  On arrival to the emergency room patient he was found to have left sided arm/leg numbness/weakness and left-sided facial weakness along with slowed speech.  NIH SS was 6.  Blood pressure 150s/119.  Initial CT of the head was negative for acute findings specifically no hemorrhage.  CTA revealed distal right M1 occlusion.  He subsequently received rtPA and was transferred to [facility] for possible mechanical thrombectomy.  Upon arrival his CT/CTP were negative.  CTA showed no MCA thrombus with questionable distal LVA occlusion.  His symptoms had resolved.  TTE showed ejection fraction 55%.  LV/LA normal.  Saline study suggestive of small degree of left to right shunting.  Lower extremity duplex was normal no evidence of DVT.  Pacemaker interrogation showed no recent arrhythmias.  Etiology of event unknown although suspicious for cardioembolic source therefore cardiology consulted to evaluate for APRIL. EEG showed evidence of sharp  Epileptiform waves within right anterior temporal lobe; no evidence of seizures. Recommend no AEDs moving forward d/t isolated episode and lack of seizure-like activity w/ subtherapeutic Dilantin level.     Neurologically unchanged. TTE done yesterday showed EF 60%. LV/LA normal. Evidence of small PFO.  We recommend dual oral antiplatelets x1 month (aspirin 81 mg and Plavix 75 mg) after 1 month discontinue Plavix and increase aspirin to 325 mg daily.  Continue high-dose  statin as written. No further neurology workup indicated. We will sign off and see again upon request.      Plan:  ASA 81mg daily (patient supposed to be on PTA; he admits to stopping x 1 week and taking intermittently) after 1 month increase ASA 325mg daily   Plavix 75mg daily (not on PTA) x30 days then discontinue and continue aspirin alone  Lipitor 40 mg daily (LDL 60)   Neurochecks  BP control  Stroke Education  JORDY/SCDs  PT/OT/ST  Follow-up w/ me in OP clinic in 3 months or Neurologist that he is already established with.       Case reviewed w/ Dr. Solares 09/20 and he agrees with plan above.     Alondra Mcleod, APRN

## 2019-09-20 NOTE — PROGRESS NOTES
"    Patient Name: Neptali Vera  :1933  85 y.o.      Patient Care Team:  Provider, No Known as PCP - General    Chief Complaint: follow up stroke s/p TPA    Interval History: he complains of diarrhea. No chest pain or dyspnea. APRIL yesterday with small PFO.        Objective   Vital Signs  Temp:  [97.6 °F (36.4 °C)-98.9 °F (37.2 °C)] 98.1 °F (36.7 °C)  Heart Rate:  [60-66] 62  Resp:  [14-18] 18  BP: (108-174)/(46-79) 126/60    Intake/Output Summary (Last 24 hours) at 2019 0946  Last data filed at 2019 0903  Gross per 24 hour   Intake 360 ml   Output 400 ml   Net -40 ml     Flowsheet Rows      First Filed Value   Admission Height  172.7 cm (68\") Documented at 2019 1402   Admission Weight  73.5 kg (162 lb 0.6 oz) Documented at 09/15/2019 1015          Physical Exam:   General Appearance:    Alert, cooperative, in no acute distress   Lungs:     Clear to auscultation.  Normal respiratory effort and rate.      Heart:    Regular rhythm and normal rate, normal S1 and S2, no murmurs, gallops or rubs.     Chest Wall:    No abnormalities observed   Abdomen:     Soft, nontender, positive bowel sounds.     Extremities:   no cyanosis, clubbing or edema.  No marked joint deformities.  Adequate musculoskeletal strength.       Results Review:    Results from last 7 days   Lab Units 19  0533   SODIUM mmol/L 139   POTASSIUM mmol/L 3.7   CHLORIDE mmol/L 104   CO2 mmol/L 22.7   BUN mg/dL 22   CREATININE mg/dL 1.30*   GLUCOSE mg/dL 111*   CALCIUM mg/dL 7.9*     Results from last 7 days   Lab Units 09/15/19  0656   TROPONIN I ng/mL <0.030     Results from last 7 days   Lab Units 19  0533   WBC 10*3/mm3 7.00   HEMOGLOBIN g/dL 11.6*   HEMATOCRIT % 33.6*   PLATELETS 10*3/mm3 137*     Results from last 7 days   Lab Units 09/15/19  0656   INR  1.05   APTT seconds 25.6         Results from last 7 days   Lab Units 19  0529   CHOLESTEROL mg/dL 122   TRIGLYCERIDES mg/dL 69   HDL CHOL mg/dL 48   LDL CHOL mg/dL " 60               Medication Review:     aspirin 81 mg Oral Daily   atorvastatin 40 mg Oral Nightly   clopidogrel 75 mg Oral Daily   famotidine 20 mg Oral BID AC   sodium chloride 10 mL Intravenous Q12H   [START ON 9/21/2019] vitamin B-12 1,000 mcg Oral Daily             Assessment/Plan   1. Acute right MCA stroke - s/p TPA.  Chronic bilateral occipital infarcts also noted. APRIL yesterday with small PFO, otherwise unremarkable. Lower extremity doppler negative. On dual antiplatelet therapy per neurology.   2. SSS s/p PPM - no arrhythmias noted on interrogation.   3. HTN - one isolated elevated BP. Overall pretty well controlled. He was not taking an antihypertensive at hoime.   4. Diarrhea - per internal medicine  5. MICHELA - may be a little dry. Hydrated on admission and renal function improved. Now creatinine back up again. Encourage oral intake.     OK to discharge from cardiac standpoint. Dual antiplatelet therapy is sufficient from cardiac standpoint. He should follow up with usual cardiologist (Dr. Sb Barney) as outpatient. Will sign off. Please call with any additional questions.     BARBIE Jones  [facility]  09/20/19  9:46 AM

## 2019-09-20 NOTE — DISCHARGE SUMMARY
DISCHARGE SUMMARY    Patient Name: Neptali Vera  Age/Sex: 85 y.o. male  : 1933  MRN: [id number]  Patient Care Team:  Provider, No Known as PCP - General       Date of Admit: 9/15/2019  Date of Discharge:  19  Discharge Condition: Good    Discharge Diagnoses:  1. RMCA ischemic stroke s/p TPA 9/15/2019  2. 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 MICHELA versus CKD, stable  4. Essential hypertension  5. Dyslipidemia  6. Sick sinus syndrome s/p pacemaker  7. Urinary frequency and dysuria  8. Microscopic hematuria  9. History of seizure in   10. Hypophosphatemia  11. Anemia, stable  12. Mild to moderate MR  13. Possible cardiac shunt  14. Mild leukocytosis, likely stress-induced  15. Hypokalemia, corrected    History of present illness from H&P from 9/15/2019:   History is limited.  He is not confused but somewhat poor historian.     This is an 85-year-old male patient with history of HTN and sick sinus syndrome.  He stated that he got up to use the bathroom and fell so he was taken to the ED.  He stated that he was not sure whether he got dizzy before the event but denies weakness, numbness or seizure-like activities.     Per reports, he was found by family with left-sided weakness and slurred speech.  He was taken to [facility] ED and had TPA.  CT perfusion images of the brain suspected right MCA occlusion.  Our neurology and neuro interventional service was contacted and they recommended transfer to our facility for possible intervention.  However, on arrival to our intensive care unit, patient was noted to be normal on the exam with no apparent weakness and his NIH was 1 initially.  His CT perfusion images were normal.    Hospital Course:   Patient suffered from right MCA stroke but his neurological symptoms/signs have completely resolved at the time of discharge.  His work-up was mostly unrevealing with exception of right to left shunt on bubble studies which was thought to be related  to very small atrial shunt.  He underwent 2D echo and APRIL which showed and revealed no evidence of  clots.  He also had interrogation of his pacemaker during his stay and otherwise no significant arrhythmia to explain a cardioembolic phenomenon.    He will need to take aspirin 81 mg and Plavix 75 mg for 1 month then stop the Plavix and switch the aspirin to 325 mg thereafter.    Follow-up with PCP in 1-2 weeks to address the issues with the medications and follow-up with neurology in 3-month.    Consults:   IP CONSULT TO CASE MANAGEMENT   IP CONSULT TO NEURO CLINICAL SPECIALIST  IP CONSULT TO REHAB ADMISSION  IP CONSULT TO CASE MANAGEMENT   IP CONSULT TO CARDIOLOGY  IP CONSULT TO CARDIOLOGY  IP CONSULT TO CASE MANAGEMENT     Significant Discharge Diagnostics   Procedures Performed:         Pertinent Lab Results:  Results from last 7 days   Lab Units 09/20/19  0533 09/19/19  0540 09/18/19  0604 09/17/19  0418 09/16/19  0529 09/15/19  0656   SODIUM mmol/L 139 136 140 138 139 139   POTASSIUM mmol/L 3.7 3.4* 3.6 3.8 3.7 3.9   CHLORIDE mmol/L 104 105 109* 103 105 107   CO2 mmol/L 22.7 20.0* 21.0* 22.3 22.8 25.0   BUN mg/dL 22 22 25* 18 20 20   CREATININE mg/dL 1.30* 1.23 1.30* 1.04 1.16 1.40*   GLUCOSE mg/dL 111* 108* 110* 104* 99 109*   CALCIUM mg/dL 7.9* 7.1* 7.5* 7.9* 8.2* 8.9   AST (SGOT) U/L  --   --   --   --   --  24   ALT (SGPT) U/L  --   --   --   --   --  17     Results from last 7 days   Lab Units 09/15/19  0656   TROPONIN I ng/mL <0.030     Results from last 7 days   Lab Units 09/20/19  0533 09/18/19  0604 09/16/19  0529  09/15/19  0656   WBC 10*3/mm3 7.00 12.06* 6.96  --  5.70   HEMOGLOBIN g/dL 11.6* 11.7* 11.8*  --  14.0   HEMATOCRIT % 33.6* 35.1* 35.3*  --  41.0   PLATELETS 10*3/mm3 137* 119* 142  --  160   MCV fL 97.4* 101.7* 101.1*  --  101.1*   MCH pg 33.6* 33.9* 33.8*  --  34.4*   MCHC g/dL 34.5 33.3 33.4  --  34.1   RDW % 12.5 12.9 13.2  --  13.3   RDW-SD  fl 43.8 47.5 48.5  --  46.4   MPV fL 10.0 10.2 10.0  --  7.7   NEUTROPHIL % % 63.7 82.5* 59.1  --  53.8   LYMPHOCYTE % % 17.4* 9.2* 23.3  --  27.8   MONOCYTES % % 13.0* 5.9 11.6  --  10.0   EOSINOPHIL % % 4.4 1.2 4.3  --  6.4*   BASOPHIL % % 0.9 0.7 1.1  --  2.0*   IMM GRAN % % 0.6* 0.5 0.6*   < >  --    NEUTROS ABS 10*3/mm3 4.46 9.96* 4.11  --  3.10   LYMPHS ABS 10*3/mm3 1.22 1.11 1.62  --  1.60   MONOS ABS 10*3/mm3 0.91* 0.71 0.81  --  0.60   EOS ABS 10*3/mm3 0.31 0.14 0.30  --  0.40   BASOS ABS 10*3/mm3 0.06 0.08 0.08  --  0.10   IMMATURE GRANS (ABS) 10*3/mm3 0.04 0.06* 0.04   < >  --    NRBC /100 WBC 0.0 0.0 0.0  --  0.0    < > = values in this interval not displayed.     Results from last 7 days   Lab Units 09/15/19  0656   INR  1.05   APTT seconds 25.6         Results from last 7 days   Lab Units 09/16/19  0529   CHOLESTEROL mg/dL 122   TRIGLYCERIDES mg/dL 69   HDL CHOL mg/dL 48   LDL CHOL mg/dL 60         Results from last 7 days   Lab Units 09/15/19  0656   TSH uIU/mL 4.420                         Results from last 7 days   Lab Units 09/15/19  1040   NITRITE UA  Negative   WBC UA /HPF 0-2   BACTERIA UA /HPF 1+*   SQUAM EPITHEL UA /HPF None Seen               Imaging Results:  Imaging Results (all)     Procedure Component Value Units Date/Time    CT Head Without Contrast [[id number]] Collected:  09/16/19 0433     Updated:  09/16/19 0440    Narrative:       CT OF THE HEAD WITHOUT CONTRAST     HISTORY: Follow-up stroke     COMPARISON: 09/15/2019     TECHNIQUE: Axial CT imaging was obtained from vertex to the skull  through the skull base. No IV contrast administered.      FINDINGS:  There is diffuse atrophy. There is periventricular deep white matter  microangiopathic change. There is no acute intracranial hemorrhage.  There is no midline shift or mass effect. No new areas of decreased  attenuation are identified. The paranasal sinuses and mastoid air cells  are clear.       Impression:       No acute  intracranial process is identified.     Radiation dose reduction techniques were utilized, including automated  exposure control and exposure modulation based on body size.     This report was finalized on 9/16/2019 4:37 AM by Dr. Sharon Vines M.D.       CT Angiogram Head With & Without Contrast [[id number]] Collected:  09/15/19 0940     Updated:  09/15/19 0951    Narrative:       CT ANGIOGRAM HEAD AND NECK AND CT PERFUSION STUDY     CLINICAL HISTORY: Stroke.     Radiation dose reduction techniques were utilized, including automated  exposure control and exposure modulation based on body size. CT scan of  the head was obtained with 3 mm axial images without the use  of IV contrast. The patient underwent a CT  perfusion study with a dynamic bolus of IV contrast. Standard perfusion  maps were constructed. The patient then underwent a CT angiogram of the  head and neck with 1 mm axial images following the administration of IV  contrast. Sagittal, coronal, and 3-dimensional reconstructed images were  obtained.  Percent stenosis was assessed in accordance with NASCET  criteria.     COMPARISON: None available     FINDINGS:     NONCONTRAST HEAD CT: Residual contrast material is present in the  vasculature related to prior imaging, limiting assessment for  intracranial hemorrhage. No acute hemorrhage or hydrocephalus is  identified. No midline shift. Hypodensities at the bilateral occipital  lobes suggest chronic or potentially subacute areas of infarct. Mild to  moderate periventricular hypodensities suggest chronic small vessel  ischemic change in a patient this age.     No enhancing lesions the brain are noted following contrast material  administration.        CT PERFUSION STUDY:  No CBF (under 30%) deficit or perfusion abnormality  is demonstrated where the T MAX is greater than 6 seconds. The  calculated mismatch volume was 0 cc.     CTA HEAD and neck: The CT angiogram of the head and neck demonstrates  occlusion  of a 7 mm segment of the intracanalicular portion of the left  vertebral artery, for example image 95 of series 6. Left vertebral  artery is dominant. Focal narrowing is apparent at the junction of the  right vertebral artery and the basilar artery, percent stenosis  difficult to characterize owing to very small caliber of the vessel.  Scattered areas of mild arterial narrowing are seen (0-49% narrowing),  for example 40% narrowing at the origin of the left vertebral artery,  estimated 40% narrowing in the supraclinoid segment of the right ICA.  The right A1 segment is apparently undeveloped, the anterior circulation  being provided from the left. The bilateral posterior cerebral arteries  are fairly diminutive, but without focal high-grade stenosis. Otherwise,  no hemodynamically significant focal stenosis, aneurysm, or dissection  in the cervical carotid or vertebral arteries, or in the arteries at the  base of the brain.        Fibronodular changes at the lung apices. Left-sided pacemaker, only  partly included.     Thyroid nodules are evident, follow-up thyroid ultrasound can be  obtained.          Impression:             1. No perfusion abnormality to suggest completed or threatened acute  infarct.  2. 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  Scattered areas of arterial narrowing, as detailed above.     3. No acute intracranial hemorrhage or hydrocephalus identified, limited  by pre-existing intravascular contrast material on the unenhanced  portion of the exam. No enhancing lesions of brain. Hypodensity in the  bilateral occipital lobes suggesting chronic to subacute areas infarct.  4. Thyroid nodularity.                 Discussed by telephone with Dr. Braun at [phone], [phone], 09/15/2019     This report was finalized on 9/15/2019 9:48 AM by Dr. Geovanni Martinez M.D.       CT Angiogram Neck With & Without Contrast [[id number]] Collected:  09/15/19 0940     Updated:  09/15/19 0951     Narrative:       CT ANGIOGRAM HEAD AND NECK AND CT PERFUSION STUDY     CLINICAL HISTORY: Stroke.     Radiation dose reduction techniques were utilized, including automated  exposure control and exposure modulation based on body size. CT scan of  the head was obtained with 3 mm axial images without the use  of IV contrast. The patient underwent a CT  perfusion study with a dynamic bolus of IV contrast. Standard perfusion  maps were constructed. The patient then underwent a CT angiogram of the  head and neck with 1 mm axial images following the administration of IV  contrast. Sagittal, coronal, and 3-dimensional reconstructed images were  obtained.  Percent stenosis was assessed in accordance with NASCET  criteria.     COMPARISON: None available     FINDINGS:     NONCONTRAST HEAD CT: Residual contrast material is present in the  vasculature related to prior imaging, limiting assessment for  intracranial hemorrhage. No acute hemorrhage or hydrocephalus is  identified. No midline shift. Hypodensities at the bilateral occipital  lobes suggest chronic or potentially subacute areas of infarct. Mild to  moderate periventricular hypodensities suggest chronic small vessel  ischemic change in a patient this age.     No enhancing lesions the brain are noted following contrast material  administration.        CT PERFUSION STUDY:  No CBF (under 30%) deficit or perfusion abnormality  is demonstrated where the T MAX is greater than 6 seconds. The  calculated mismatch volume was 0 cc.     CTA HEAD and neck: The CT angiogram of the head and neck demonstrates  occlusion of a 7 mm segment of the intracanalicular portion of the left  vertebral artery, for example image 95 of series 6. Left vertebral  artery is dominant. Focal narrowing is apparent at the junction of the  right vertebral artery and the basilar artery, percent stenosis  difficult to characterize owing to very small caliber of the vessel.  Scattered areas of mild  arterial narrowing are seen (0-49% narrowing),  for example 40% narrowing at the origin of the left vertebral artery,  estimated 40% narrowing in the supraclinoid segment of the right ICA.  The right A1 segment is apparently undeveloped, the anterior circulation  being provided from the left. The bilateral posterior cerebral arteries  are fairly diminutive, but without focal high-grade stenosis. Otherwise,  no hemodynamically significant focal stenosis, aneurysm, or dissection  in the cervical carotid or vertebral arteries, or in the arteries at the  base of the brain.        Fibronodular changes at the lung apices. Left-sided pacemaker, only  partly included.     Thyroid nodules are evident, follow-up thyroid ultrasound can be  obtained.          Impression:             1. No perfusion abnormality to suggest completed or threatened acute  infarct.  2. 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  Scattered areas of arterial narrowing, as detailed above.     3. No acute intracranial hemorrhage or hydrocephalus identified, limited  by pre-existing intravascular contrast material on the unenhanced  portion of the exam. No enhancing lesions of brain. Hypodensity in the  bilateral occipital lobes suggesting chronic to subacute areas infarct.  4. Thyroid nodularity.                 Discussed by telephone with Dr. Braun at 0 [phone], [phone], 09/15/2019     This report was finalized on 9/15/2019 9:48 AM by Dr. Geovanni Martinez M.D.       CT Cerebral Perfusion With & Without Contrast [[id number]] Collected:  09/15/19 0940     Updated:  09/15/19 0951    Narrative:       CT ANGIOGRAM HEAD AND NECK AND CT PERFUSION STUDY     CLINICAL HISTORY: Stroke.     Radiation dose reduction techniques were utilized, including automated  exposure control and exposure modulation based on body size. CT scan of  the head was obtained with 3 mm axial images without the use  of IV contrast. The patient underwent a  CT  perfusion study with a dynamic bolus of IV contrast. Standard perfusion  maps were constructed. The patient then underwent a CT angiogram of the  head and neck with 1 mm axial images following the administration of IV  contrast. Sagittal, coronal, and 3-dimensional reconstructed images were  obtained.  Percent stenosis was assessed in accordance with NASCET  criteria.     COMPARISON: None available     FINDINGS:     NONCONTRAST HEAD CT: Residual contrast material is present in the  vasculature related to prior imaging, limiting assessment for  intracranial hemorrhage. No acute hemorrhage or hydrocephalus is  identified. No midline shift. Hypodensities at the bilateral occipital  lobes suggest chronic or potentially subacute areas of infarct. Mild to  moderate periventricular hypodensities suggest chronic small vessel  ischemic change in a patient this age.     No enhancing lesions the brain are noted following contrast material  administration.        CT PERFUSION STUDY:  No CBF (under 30%) deficit or perfusion abnormality  is demonstrated where the T MAX is greater than 6 seconds. The  calculated mismatch volume was 0 cc.     CTA HEAD and neck: The CT angiogram of the head and neck demonstrates  occlusion of a 7 mm segment of the intracanalicular portion of the left  vertebral artery, for example image 95 of series 6. Left vertebral  artery is dominant. Focal narrowing is apparent at the junction of the  right vertebral artery and the basilar artery, percent stenosis  difficult to characterize owing to very small caliber of the vessel.  Scattered areas of mild arterial narrowing are seen (0-49% narrowing),  for example 40% narrowing at the origin of the left vertebral artery,  estimated 40% narrowing in the supraclinoid segment of the right ICA.  The right A1 segment is apparently undeveloped, the anterior circulation  being provided from the left. The bilateral posterior cerebral arteries  are fairly  diminutive, but without focal high-grade stenosis. Otherwise,  no hemodynamically significant focal stenosis, aneurysm, or dissection  in the cervical carotid or vertebral arteries, or in the arteries at the  base of the brain.        Fibronodular changes at the lung apices. Left-sided pacemaker, only  partly included.     Thyroid nodules are evident, follow-up thyroid ultrasound can be  obtained.          Impression:             1. No perfusion abnormality to suggest completed or threatened acute  infarct.  2. 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  Scattered areas of arterial narrowing, as detailed above.     3. No acute intracranial hemorrhage or hydrocephalus identified, limited  by pre-existing intravascular contrast material on the unenhanced  portion of the exam. No enhancing lesions of brain. Hypodensity in the  bilateral occipital lobes suggesting chronic to subacute areas infarct.  4. Thyroid nodularity.                 Discussed by telephone with Dr. Braun at 0 [phone], [phone], 09/15/2019     This report was finalized on 9/15/2019 9:48 AM by Dr. Geovanni Martinez M.D.             Objective:   Temp:  [97.8 °F (36.6 °C)-98.9 °F (37.2 °C)] 97.8 °F (36.6 °C)  Heart Rate:  [62-65] 65  Resp:  [18] 18  BP: (126-174)/(60-79) 148/71   SpO2:  [94 %-96 %] 96 %  on    Device (Oxygen Therapy): room air    Intake/Output Summary (Last 24 hours) at 9/20/2019 1649  Last data filed at 9/20/2019 1251  Gross per 24 hour   Intake 480 ml   Output 785 ml   Net -305 ml     Body mass index is 23.63 kg/m².      09/17/19  1402 09/19/19  0330 09/20/19  0500   Weight: 72.2 kg (159 lb 1.6 oz) 72.7 kg (160 lb 3.2 oz) 70.5 kg (155 lb 6.4 oz)     Weight change: -2.177 kg (-12.8 oz)    Physical Exam:  General Appearance:    Alert, cooperative, in no acute distress   ENMT:  Mallampati score 3, moist mucous membrane   Eyes: Pupils equals and reactive to light. EOMI   Neck:   Trachea midline. No thyromegaly.    Lungs:     Clear to auscultation,respirations regular, even and                  unlabored    Heart:    Regular rhythm and normal rate, normal S1 and S2, no            murmur   Skin:    No abnormalities observed   Abdomen:     Soft. No tenderness. No HSM.   Neuro:   Conscious, alert, oriented x3. Strength 5/5 in upper and lower ext   Extremities:   Moves all extremities well, no edema, no cyanosis, no             Redness         Discharge Medications and Instructions:     Discharge Medications     Discharge Medications      New Medications      Instructions Start Date   atorvastatin 40 MG tablet  Commonly known as:  LIPITOR   40 mg, Oral, Nightly      clopidogrel 75 MG tablet  Commonly known as:  PLAVIX   75 mg, Oral, Daily   Start Date:  9/21/2019     cyanocobalamin 1000 MCG tablet  Commonly known as:  VITAMIN B-12   1,000 mcg, Oral, Daily   Start Date:  9/21/2019     famotidine 20 MG tablet  Commonly known as:  PEPCID   20 mg, Oral, 2 Times Daily Before Meals         Continue These Medications      Instructions Start Date   aspirin 81 MG chewable tablet   81 mg, Oral, Daily         Stop These Medications    phenytoin 100 MG ER capsule  Commonly known as:  DILANTIN            Discharge Diet:    Dietary Orders (From admission, onward)    Start     Ordered    09/19/19 1620  Diet Soft Texture; Ground; Thin; Cardiac  Diet Effective Now     Question Answer Comment   Diet Texture / Consistency Soft Texture    Select Texture: Ground    Fluid Consistency Thin    Common Modifiers Cardiac        09/19/19 1619          Activity at Discharge:   as tolerated    Discharge disposition: Home    Discharge Instructions and Follow ups:    Follow-up Information     Alondra Mcleod APRN. Schedule an appointment as soon as possible for a visit in 3 month(s).    Specialties:  Emergency Medicine, Neurology, Nurse Practitioner  Contact information:  [address] [place], [place] [id number] [phone]             Kirill Mccoy  MD Elaine. Schedule an appointment as soon as possible for a visit in 1 week(s).    Specialty:  Family Medicine  Contact information:  [address]  [place] [address]  [phone]             Provider, No Known .    Contact information:  [facility] [place] [address]                 No future appointments.     Medication Reconciliation: Please see electronically completed Med Rec.    Total time spent discharging patient including evaluation, medication reconciliation, arranging follow up, and post hospitalization instructions and education total time exceeds 30 minutes.     Aniceto Dexter MD  09/20/19  4:49 PM      Dictated utilizing Dragon dictation

## 2019-09-21 ENCOUNTER — READMISSION MANAGEMENT (OUTPATIENT)
Dept: CALL CENTER | Facility: HOSPITAL | Age: 84
End: 2019-09-21

## 2019-09-21 NOTE — OUTREACH NOTE
Prep Survey      Responses   Facility patient discharged from?  [facility]   Is patient eligible?  Yes   Discharge diagnosis  RMCA ischemic stroke    Does the patient have one of the following disease processes/diagnoses(primary or secondary)?  Stroke (TIA)   Does the patient have Home health ordered?  No   Is there a DME ordered?  No   Prep survey completed?  Yes          Sailaja Wallace RN

## 2019-09-24 ENCOUNTER — READMISSION MANAGEMENT (OUTPATIENT)
Dept: CALL CENTER | Facility: HOSPITAL | Age: 84
End: 2019-09-24

## 2019-09-24 NOTE — OUTREACH NOTE
Stroke Week 1 Survey      Responses   Facility patient discharged from?  [facility]   Does the patient have one of the following disease processes/diagnoses(primary or secondary)?  Stroke (TIA)   Is there a successful TCM telephone encounter documented?  No   Week 1 attempt successful?  Yes   Call start time  1611   Call end time  1618   Discharge diagnosis  RMCA ischemic stroke    Is patient permission given to speak with other caregiver?  Yes   List who call center can speak with  granddaughter,Shavon and daughter Flor.   Person spoke with today (if not patient) and relationship  Both granddaughter Shavon and daughter Flor.    Meds reviewed with patient/caregiver?  Yes   Is the patient having any side effects they believe may be caused by any medication additions or changes?  No   Does the patient have all medications ordered at discharge?  Yes   Is the patient taking all medications as directed (includes completed medication regime)?  Yes   Medication comments  Daughter states some difficulty in getting meds, but did get them. She states that [facility]    Does the patient have a primary care provider?   Yes   Does the patient have an appointment with their PCP within 7 days of discharge?  No   Comments regarding PCP  [facility]    Nursing Interventions  Educated patient on importance of making appointment, Advised patient to make appointment   Has the patient kept scheduled appointments due by today?  N/A   Has home health visited the patient within 72 hours of discharge?  N/A   DME comments  Daughter states patient using cane for ambulation.    Psychosocial issues?  No   Does the patient require any assistance with activities of daily living such as eating, bathing, dressing, walking, etc.?  No   Does the patient have any residual symptoms from stroke/TIA?  No   Residual symptoms comments  Daughter states that she feels like patient is back to his normal.    Does the  patient understand the diet ordered at discharge?  Yes   Did the patient receive a copy of their discharge instructions?  Yes   Nursing interventions  Reviewed instructions with patient   What is the patient's perception of their health status since discharge?  Returned to baseline/stable   Is the patient able to teach back FAST for Stroke?  Yes   Is the patient/caregiver able to teach back the risk factors for a stroke?  High blood pressure-goal below 120/80, High Cholesterol, Carotid or other artery disease   Is the patient/caregiver able to teach back signs and symptoms related to disease process for when to call PCP?  Yes   Is the patient/caregiver able to teach back signs and symptoms related to disease process for when to call 911?  Yes   Is the patient/caregiver able to teach back the hierarchy of who to call/visit for symptoms/problems? PCP, Specialist, Home health nurse, Urgent Care, ED, 911  Yes   Week 1 call completed?  Yes          Liz Mejía RN

## 2019-10-03 ENCOUNTER — READMISSION MANAGEMENT (OUTPATIENT)
Dept: CALL CENTER | Facility: HOSPITAL | Age: 84
End: 2019-10-03

## 2019-10-03 NOTE — OUTREACH NOTE
Stroke Week 2 Survey      Responses   Facility patient discharged from?  [place]   Does the patient have one of the following disease processes/diagnoses(primary or secondary)?  Stroke (TIA)   Week 2 attempt successful?  Yes   Call start time  1621   Call end time  1623   Discharge diagnosis  [facility] ischemic stroke    Is patient permission given to speak with other caregiver?  Yes   List who call center can speak with  granddaShavon love and daughter Flor.   Meds reviewed with patient/caregiver?  Yes   Is the patient taking all medications as directed (includes completed medication regime)?  Yes   Does the patient have a primary care provider?   Yes   Comments regarding PCP  [facility]    Has the patient kept scheduled appointments due by today?  Yes   Has home health visited the patient within 72 hours of discharge?  N/A   Psychosocial issues?  No   Does the patient require any assistance with activities of daily living such as eating, bathing, dressing, walking, etc.?  No   Does the patient have any residual symptoms from stroke/TIA?  No   Does the patient understand the diet ordered at discharge?  Yes   Did the patient receive a copy of their discharge instructions?  Yes   What is the patient's perception of their health status since discharge?  Improving   Is the patient/caregiver able to teach back signs and symptoms related to disease process for when to call PCP?  Yes   Is the patient/caregiver able to teach back signs and symptoms related to disease process for when to call 911?  Yes   Is the patient/caregiver able to teach back the hierarchy of who to call/visit for symptoms/problems? PCP, Specialist, Home health nurse, Urgent Care, ED, 911  Yes   Week 2 call completed?  Yes          Liz Mejía RN

## 2019-10-11 ENCOUNTER — READMISSION MANAGEMENT (OUTPATIENT)
Dept: CALL CENTER | Facility: HOSPITAL | Age: 84
End: 2019-10-11

## 2019-10-18 ENCOUNTER — READMISSION MANAGEMENT (OUTPATIENT)
Dept: CALL CENTER | Facility: HOSPITAL | Age: 84
End: 2019-10-18

## 2019-10-18 NOTE — OUTREACH NOTE
Stroke Week 4 Survey      Responses   Facility patient discharged from?  [facility]   Does the patient have one of the following disease processes/diagnoses(primary or secondary)?  Stroke (TIA)   Week 4 attempt successful?  Yes   Call start time  1811   Call end time  1815   Discharge diagnosis  [facility] ischemic stroke    Is patient permission given to speak with other caregiver?  Yes   List who call center can speak with  granddaughterShavon and daughter Flor.   Meds reviewed with patient/caregiver?  Yes   Is the patient taking all medications as directed (includes completed medication regime)?  Yes   Has the patient kept scheduled appointments due by today?  Yes   Is the patient still receiving Home Health Services?  N/A   Psychosocial issues?  No   Does the patient require any assistance with activities of daily living such as eating, bathing, dressing, walking, etc.?  No   Does the patient have any residual symptoms from stroke/TIA?  No   What is the patient's perception of their health status since discharge?  Improving   Nursing interventions  Nurse provided patient education   Is the patient able to teach back FAST for Stroke?  Yes   Is the patient/caregiver able to teach back signs and symptoms related to disease process for when to call PCP?  Yes   Is the patient/caregiver able to teach back signs and symptoms related to disease process for when to call 911?  Yes   Is the patient/caregiver able to teach back the hierarchy of who to call/visit for symptoms/problems? PCP, Specialist, Home health nurse, Urgent Care, ED, 911  Yes   Week 4 Call Completed?  Yes   Would the patient like one additional call?  No   Graduated  Yes   Did the patient feel the follow up calls were helpful during their recovery period?  Yes   Was the number of calls appropriate?  Yes          Liz Mejía RN

## 2019-11-06 ENCOUNTER — OFFICE VISIT (OUTPATIENT)
Dept: NEUROLOGY | Facility: CLINIC | Age: 84
End: 2019-11-06

## 2019-11-06 ENCOUNTER — TELEPHONE (OUTPATIENT)
Dept: NEUROLOGY | Facility: CLINIC | Age: 84
End: 2019-11-06

## 2019-11-06 ENCOUNTER — DOCUMENTATION (OUTPATIENT)
Dept: NEUROLOGY | Facility: CLINIC | Age: 84
End: 2019-11-06

## 2019-11-06 VITALS
DIASTOLIC BLOOD PRESSURE: 70 MMHG | OXYGEN SATURATION: 99 % | BODY MASS INDEX: 24.1 KG/M2 | HEIGHT: 68 IN | HEART RATE: 70 BPM | WEIGHT: 159 LBS | SYSTOLIC BLOOD PRESSURE: 164 MMHG

## 2019-11-06 DIAGNOSIS — I63.9 ISCHEMIC STROKE (HCC): Primary | ICD-10-CM

## 2019-11-06 DIAGNOSIS — E53.8 LOW SERUM VITAMIN B12: ICD-10-CM

## 2019-11-06 DIAGNOSIS — Z91.89 AT RISK FOR OBSTRUCTIVE SLEEP APNEA: ICD-10-CM

## 2019-11-06 DIAGNOSIS — R56.9 SEIZURE-LIKE ACTIVITY (HCC): ICD-10-CM

## 2019-11-06 DIAGNOSIS — F41.9 ANXIETY: ICD-10-CM

## 2019-11-06 PROCEDURE — 99213 OFFICE O/P EST LOW 20 MIN: CPT | Performed by: NURSE PRACTITIONER

## 2019-11-06 RX ORDER — ASPIRIN 325 MG
325 TABLET ORAL DAILY
Qty: 100 TABLET | Refills: 3 | Status: SHIPPED | OUTPATIENT
Start: 2019-11-06 | End: 2019-11-06 | Stop reason: SDUPTHER

## 2019-11-06 RX ORDER — SERTRALINE HYDROCHLORIDE 25 MG/1
25 TABLET, FILM COATED ORAL DAILY
Qty: 30 TABLET | Refills: 2 | Status: SHIPPED | OUTPATIENT
Start: 2019-11-06 | End: 2019-11-20 | Stop reason: SDUPTHER

## 2019-11-06 RX ORDER — SERTRALINE HYDROCHLORIDE 25 MG/1
25 TABLET, FILM COATED ORAL DAILY
Qty: 30 TABLET | Refills: 2 | Status: SHIPPED | OUTPATIENT
Start: 2019-11-06 | End: 2019-11-06 | Stop reason: SDUPTHER

## 2019-11-06 RX ORDER — ATORVASTATIN CALCIUM 20 MG/1
20 TABLET, FILM COATED ORAL DAILY
Qty: 30 TABLET | Refills: 11 | Status: SHIPPED | OUTPATIENT
Start: 2019-11-06 | End: 2019-11-06 | Stop reason: SDUPTHER

## 2019-11-06 RX ORDER — ATORVASTATIN CALCIUM 20 MG/1
20 TABLET, FILM COATED ORAL DAILY
Qty: 30 TABLET | Refills: 11 | Status: SHIPPED | OUTPATIENT
Start: 2019-11-06 | End: 2020-11-05

## 2019-11-06 RX ORDER — LISINOPRIL 20 MG/1
10 TABLET ORAL DAILY
COMMUNITY

## 2019-11-06 RX ORDER — ASPIRIN 325 MG
325 TABLET ORAL DAILY
Qty: 100 TABLET | Refills: 3 | Status: SHIPPED | OUTPATIENT
Start: 2019-11-06 | End: 2020-11-05

## 2019-11-06 NOTE — TELEPHONE ENCOUNTER
Pt had an appt for a BP check at [facility].  BP in office 164/70.  Discussed case with Vesta RN at [facility] who states that she will cancel today's appt and send a message to Dr. Mccoy regarding pt's BP to make any necessary changes to medication.

## 2019-11-06 NOTE — PROGRESS NOTES
DOS: 2019  NAME: Neptali Vera   : 1933  PCP: Provider, No Known    Chief Complaint   Patient presents with   • Stroke      Is accompanied by his daughter who assist with providing history    Neurological Problem and Interval History:  85 y.o. right-handed male with a Hx of Permanent pacemaker, hypertension and questionable seizures (on Dilantin since pacemaker placement ) who I am seen today in follow-up for threatened right MCA stroke, right MCA occlusion noted on CT 2019.    Mr. Vera presented to [facility] on September 15 2019th after sustaining a fall.  On arrival he was noted to have left-sided facial droop and left-sided weakness along with slowed speech.  His NIHSS on arrival was 6.  Blood pressure 150s/119.  His initial head CT was negative for acute findings specifically no hemorrhage therefore he received anti-thrombolytic therapy.  CTA revealed distal right M1 occlusion therefore he was transferred to [facility] for possible mechanical embolectomy.  CT a and CTP here showed no MCA thrombus with questionable distal left vertebral artery occlusion.  On arrival his symptoms had resolved therefore mechanical thrombectomy was not indicated.  Surface echo showed ejection fraction 55%.  LV/LA was normal.  Saline studies were suggestive of small degree of left to right shunting therefore lower extremity Dopplers were obtained to further evaluate for DVT.  No evidence of DVT was found.  Pacemaker interrogation showed no recent arrhythmias.  MRI was unable to be completed due to presence of pacemaker.  Etiology of this event was thought to be secondary to cardioembolic source therefore cardiology was consulted to pleat APRIL.  APRIL showed ejection fraction 60%.  LV/LA normal.  There is evidence of a small PFO.  EEG showed evidence of sharp epileptiform waves within the right anterior temporal lobe; there was no evidence of seizure activity.  His Dilantin level was  subtherapeutic on arrival therefore given the history (isolated event after pacemaker concerning for seizure) it was recommended by the neurology team to discontinue Dilantin.  Was discharged home on dual oral antiplatelets x1 month and recommended to discontinue Plavix after 1 month and increase aspirin to 325 mg daily as he was on aspirin 81 mg daily prior to this event.  He completed dual oral antiplatelet therapy and in the last couple of days actually increased aspirin to 325 mg daily.  He continues to take high-dose statin.  He has planned follow-up with cardiology February 5, 2020 with Dr. Barney; I recommended they discuss PFO treatment options at that time.    Since discharge, daughter states that patient has had several episodes of loss of time for 24-hour periods she describes that patient does not appear to know family and has no recall or recollection of the days of these events.  She denies any staring no specific loss of memory other than the date of the episode, seizure-like activity, no urinary incontinence and/or tongue biting.  Daughter is concerned about recurrent seizure episodes as patient had very similar episodes to this when he was placed on Dilantin.  I will plan to obtain a prolonged EEG and further evaluate need for AEDs at that time.  Also patient is at moderate risk for obstructive sleep apnea therefore I will refer him to Dr. Catie Zhang who is also an epilepsy neurologist I will ask that she weigh in on his EEG and need for AEDs at that time.  Daughter reports increased anxiety as their family has lots of things going on; daughter who he lives with recently had a stroke and her  is facing end-of-life care as he was just admitted to hospice.  He previously was on Zoloft; discharge for unknown reason daughter would like to resume if possible.  I agree with low-dose Zoloft 25 mg daily we can possibly increase to 50 mg daily at next appointment if he is tolerating without  any issues.  Pressure somewhat elevated since discharge; daughter reports that he was recently restarted on lisinopril 40 mg daily.  Today blood pressure 164/70.  [facility]  I will asked that my staff call to update them of today's manual blood pressure cuff findings.  He denies any falls since discharge.  He does not use any assistive devices.  Is essentially back to his baseline minus the intermittent episodes noted above.    Review of Systems:        Review of Systems   Constitutional: Negative for activity change, appetite change and unexpected weight change.   HENT: Positive for sinus pressure. Negative for facial swelling, trouble swallowing and voice change.    Eyes: Negative for photophobia, pain and visual disturbance.   Respiratory: Negative for chest tightness, shortness of breath and wheezing.    Cardiovascular: Negative for chest pain, palpitations and leg swelling.   Gastrointestinal: Negative for abdominal pain, nausea and vomiting.   Endocrine: Negative for polydipsia and polyphagia.   Musculoskeletal: Negative for arthralgias, back pain, gait problem, joint swelling, myalgias, neck pain and neck stiffness.   Neurological: Positive for numbness (occasional ). Negative for dizziness, tremors, seizures, syncope, facial asymmetry, speech difficulty, weakness, light-headedness and headaches.   Hematological: Does not bruise/bleed easily.   Psychiatric/Behavioral: Positive for confusion and decreased concentration. Negative for agitation, behavioral problems, dysphoric mood, hallucinations, self-injury, sleep disturbance and suicidal ideas. The patient is nervous/anxious. The patient is not hyperactive.          Current Outpatient Medications:   •  Cholecalciferol (VITAMIN D-1000 MAX ST) 25 MCG (1000 UT) tablet, Take 1,000 Units by mouth Daily., Disp: , Rfl:   •  cyanocobalamin (VITAMIN B-12) 1000 MCG tablet, Take 1 tablet by mouth Daily., Disp: 30 tablet, Rfl:  "0  •  famotidine (PEPCID) 20 MG tablet, Take 1 tablet by mouth 2 (Two) Times a Day Before Meals., Disp: 60 tablet, Rfl: 0  •  lisinopril (PRINIVIL,ZESTRIL) 40 MG tablet, Take 40 mg by mouth Daily., Disp: , Rfl:   •  aspirin (YUDELKA ASPIRIN) 325 MG tablet, Take 1 tablet by mouth Daily., Disp: 100 tablet, Rfl: 3  •  atorvastatin (LIPITOR) 20 MG tablet, Take 1 tablet by mouth Daily., Disp: 30 tablet, Rfl: 11  •  sertraline (ZOLOFT) 25 MG tablet, Take 1 tablet by mouth Daily., Disp: 30 tablet, Rfl: 2    \"The following portions of the patient's history were reviewed and updated as appropriate: allergies, current medications, past family history, past medical history, past social history, past surgical history and problem list.\"  Review and Interpretation of Imaging:  Reviewed inpatient work-up from September 2019    Laboratory Results:             Lab Results   Component Value Date    HGBA1C 5.68 (H) 09/16/2019         Lab Results   Component Value Date    CHOL 122 09/16/2019         Lab Results   Component Value Date    HDL 48 09/16/2019         Lab Results   Component Value Date    LDL 60 09/16/2019         Lab Results   Component Value Date    TRIG 69 09/16/2019     No results found for: RPR  Lab Results   Component Value Date    TSH 4.420 09/15/2019     Lab Results   Component Value Date    BVMXVZFM06 276 09/15/2019       Physical Examination: NIHSS: 0 mRS: 0  General Appearance:   Well developed, well nourished, well groomed, alert, and cooperative.  HEENT: Normocephalic.   Neck and Spine: Normal range of motion.  Normal alignment. No mass or tenderness. No bruits.  Cardiac: Regular rate and rhythm. No murmurs.  Peripheral Vasculature: Radial and pedal pulses are equal and symmetric. .  Extremities:    No edema or deformities. Normal joint ROM.  Skin:    No rashes or birth marks.    Neurological examination:  Higher Integrative  Function: Oriented to time, place and person. Normal registration, recall, attention span " and concentration. Language: mild dysarthria; intermittent word finding issues. No neglect with normal visual-spatial function and construction.   CN II: Pupils are equal, round, and reactive to light. Normal visual acuity and visual fields.    CN III IV VI: Extraocular movements are full without nystagmus.   CN V: Normal facial sensation and strength of muscles of mastication.  CN VII: Facial movements are symmetric. No weakness.  CN VIII:   Auditory acuity is normal.  CN IX & X:   Symmetric palatal movement.  CN XI: Sternocleidomastoid and trapezius are normal.  No weakness.  CN XII:   The tongue is midline.  No atrophy or fasciculations.  Motor: Normal muscle strength, bulk and tone in upper and lower extremities.  No fasciculations, rigidity, spasticity, or abnormal movements.  Reflexes: Plantar responses are flexor.  Sensation: Normal to light touch in arms and legs.   Station and Gait: Normal gait and station.    Coordination:  Finger to nose test shows no dysmetria.        Diagnoses / Discussion: 85-year-old male with permanent pacemaker, hypertension and questionable seizures (on Dilantin since pacemaker placement 2009; d/c'd 9/19) who I saw today in follow-up for threatened right MCA stroke, right MCA occlusion noted on CT September 2019.  His exam is nonfocal.  Since discharge family reports several episodes of confusion/loss of time and memory for up to 24-hour timeframe.  Family concern for seizures as patient had very similar episodes after pacemaker placement when he was started on AED (Dilantin 2009).  Given his history and concern for seizure I have recommended prolonged EEG.  I have referred patient to Dr. Catie Zhang for DYLON evaluation I will asked that she also IN on EEG results and need for resuming AED.  He reports some increased anxiety.  Previously tolerated Zoloft without any issues both he and family would like to resume; order placed.  Other recommendations below.  Keep plan follow-up.   Call with any questions.    Plan:   Decrease atorvastatin to 20 mg daily   Increase aspirin 325 days   Continue B12 replacement   Prolonged; 4-hour EEG d/t suspicion for seizure    Zoloft 25 mg daily   Blood pressure control to <130/80   Goal LDL <70-recommend high dose statins-    Serum glucose < 140   Call 911 for stroke any stroke symptoms   Follow-up 6 months; sooner if needed   Van was seen today for stroke.    Diagnoses and all orders for this visit:    Ischemic stroke (CMS/HCC)  -     Discontinue: atorvastatin (LIPITOR) 20 MG tablet; Take 1 tablet by mouth Daily.  -     Discontinue: aspirin (YUDELKA ASPIRIN) 325 MG tablet; Take 1 tablet by mouth Daily.  -     Discontinue: atorvastatin (LIPITOR) 20 MG tablet; Take 1 tablet by mouth Daily.  -     Discontinue: aspirin (YUDELKA ASPIRIN) 325 MG tablet; Take 1 tablet by mouth Daily.  -     atorvastatin (LIPITOR) 20 MG tablet; Take 1 tablet by mouth Daily.  -     aspirin (YUDELKA ASPIRIN) 325 MG tablet; Take 1 tablet by mouth Daily.    Seizure-like activity (CMS/HCC)  -     EEG (Hospital Performed); Future    At risk for obstructive sleep apnea  -     Ambulatory Referral to Sleep Medicine    Anxiety  -     Discontinue: sertraline (ZOLOFT) 25 MG tablet; Take 1 tablet by mouth Daily.  -     Discontinue: sertraline (ZOLOFT) 25 MG tablet; Take 1 tablet by mouth Daily.  -     sertraline (ZOLOFT) 25 MG tablet; Take 1 tablet by mouth Daily.    Low serum vitamin B12  -     cyanocobalamin (VITAMIN B-12) 1000 MCG tablet; Take 1 tablet by mouth Daily.        MDM  Reviewed: previous chart  Reviewed previous: labs and CT scan  Interpretation: CT scan and labs  Consults: Sleep medicine.

## 2019-11-14 ENCOUNTER — APPOINTMENT (OUTPATIENT)
Dept: NEUROLOGY | Facility: HOSPITAL | Age: 84
End: 2019-11-14

## 2019-11-18 ENCOUNTER — HOSPITAL ENCOUNTER (OUTPATIENT)
Dept: NEUROLOGY | Facility: HOSPITAL | Age: 84
Discharge: HOME | End: 2019-11-18
Admitting: NURSE PRACTITIONER

## 2019-11-18 DIAGNOSIS — R56.9 SEIZURE-LIKE ACTIVITY (HCC): ICD-10-CM

## 2019-11-18 PROCEDURE — 95812 EEG 41-60 MINUTES: CPT | Performed by: PSYCHIATRY & NEUROLOGY

## 2019-11-18 PROCEDURE — 95812 EEG 41-60 MINUTES: CPT

## 2019-11-20 ENCOUNTER — TELEPHONE (OUTPATIENT)
Dept: NEUROLOGY | Facility: CLINIC | Age: 84
End: 2019-11-20

## 2019-11-20 ENCOUNTER — DOCUMENTATION (OUTPATIENT)
Dept: NEUROLOGY | Facility: CLINIC | Age: 84
End: 2019-11-20

## 2019-11-20 DIAGNOSIS — R94.01 ABNORMAL EEG: Primary | ICD-10-CM

## 2019-11-20 DIAGNOSIS — I49.9 CARDIAC ARRHYTHMIA, UNSPECIFIED CARDIAC ARRHYTHMIA TYPE: Primary | ICD-10-CM

## 2019-11-20 DIAGNOSIS — I63.9 CEREBROVASCULAR ACCIDENT (CVA), UNSPECIFIED MECHANISM (HCC): ICD-10-CM

## 2019-11-20 DIAGNOSIS — F41.9 ANXIETY: ICD-10-CM

## 2019-11-20 RX ORDER — SERTRALINE HYDROCHLORIDE 25 MG/1
25 TABLET, FILM COATED ORAL DAILY
Qty: 30 TABLET | Refills: 2 | Status: SHIPPED | OUTPATIENT
Start: 2019-11-20 | End: 2019-12-04 | Stop reason: SDUPTHER

## 2019-11-20 RX ORDER — LEVETIRACETAM 500 MG/1
500 TABLET ORAL 2 TIMES DAILY
Qty: 60 TABLET | Refills: 3 | Status: SHIPPED | OUTPATIENT
Start: 2019-11-20 | End: 2019-12-11 | Stop reason: HOSPADM

## 2019-11-20 RX ORDER — LEVETIRACETAM 500 MG/1
500 TABLET ORAL 2 TIMES DAILY
Qty: 60 TABLET | Refills: 3 | Status: SHIPPED | OUTPATIENT
Start: 2019-11-20 | End: 2019-11-20 | Stop reason: SDUPTHER

## 2019-11-20 NOTE — PROGRESS NOTES
"EEG Results called to [facility] Emergency contact on chart.   EEG abnormal. Evidence of right anterior temporal lobe epileptiform discharges.No electrographic seizures seen. Given patients confusion/ \"loss of time\" will plan initiate AED and re-evaluate in clinic in 2 months. Arrhythmia noted on EEG will plan to complete LT Holter to further evaluate.      Plan:   Keppra 500mg BID   LT Holter      Clinical Interpretation:  This greater than 1 hour continuous EEG recording is abnormal due to the presence of 2 right anterior temporal interictal epileptiform discharges.  The results of study likely indicate a predilection focal onset seizures originating the right anterior temporal head region.  No electrographic seizures were present during this recording.  Careful clinical and radiographic correlation is advised.                Alondra Mcleod, APRREANNA  11/20/19  3:13 PM    "

## 2019-11-20 NOTE — TELEPHONE ENCOUNTER
Pt's daughter states that [facility]oloft [facility].  [facility] at [phone].  Fax confirmation received.

## 2019-11-27 ENCOUNTER — TELEPHONE (OUTPATIENT)
Dept: NEUROLOGY | Facility: CLINIC | Age: 84
End: 2019-11-27

## 2019-11-27 NOTE — TELEPHONE ENCOUNTER
I called Sravani to reevaluate and see how the pt was doing.  She states that since starting Keppra, he's been very drowsy.  They are concerned that his drowsiness will cause him to fall.  Sravani states that the pt decided to start breaking the Keppra in half and is currently taking 250mg BID.  She states that he is less tired this way and currently tolerating it.  I did let her know that the ordered dose is 500mg BID.  She denies that he is having seizures or any seizure like activity.    She states that when he was taking Dilantin, he was not drowsy.  She mentioned possibly switching him back to Dilantin if he will tolerate it.

## 2019-12-04 ENCOUNTER — TELEPHONE (OUTPATIENT)
Dept: NEUROLOGY | Facility: CLINIC | Age: 84
End: 2019-12-04

## 2019-12-04 DIAGNOSIS — F41.9 ANXIETY: ICD-10-CM

## 2019-12-04 RX ORDER — SERTRALINE HYDROCHLORIDE 25 MG/1
25 TABLET, FILM COATED ORAL DAILY
Qty: 30 TABLET | Refills: 2 | Status: SHIPPED | OUTPATIENT
Start: 2019-12-04 | End: 2020-12-03

## 2019-12-04 NOTE — TELEPHONE ENCOUNTER
Left message for Sravani to let her know that rx for Zoloft was sent to [facility] per her request and to return my call about Keppra dose.    Per previous conversation, Sravani states that pt is taking Keppra 250mg qam and qhs, as the prescribed dose (500mg BID) makes him drowsy.  Discussed situation with BARBIE Vyas.  She recommends that patient take Keppra 250mg qam and 500mg qhs. Alternative medications may be expensive but Keppra XR or Vimpat could be considered.

## 2019-12-10 ENCOUNTER — TELEPHONE (OUTPATIENT)
Dept: NEUROLOGY | Facility: CLINIC | Age: 84
End: 2019-12-10

## 2019-12-10 NOTE — TELEPHONE ENCOUNTER
Pt doesn't want to take Keppra. He c/o fo stomach issues and being groggy. Would like to go back on Dilantin and  that the he took it for 10 years and was doing fine.

## 2019-12-10 NOTE — TELEPHONE ENCOUNTER
Pt daughter  Sravani Foreman sts that pt is not taking his med (KEPPRA) can he go back to his old med that he took 10 years ago .Please call her at [phone]...Sravani Foreman.

## 2019-12-11 DIAGNOSIS — R56.9 OBSERVED SEIZURE-LIKE ACTIVITY (HCC): Primary | ICD-10-CM

## 2019-12-11 RX ORDER — PHENYTOIN SODIUM 100 MG/1
200 CAPSULE, EXTENDED RELEASE ORAL 2 TIMES DAILY
Qty: 90 CAPSULE | Refills: 2 | Status: SHIPPED | OUTPATIENT
Start: 2019-12-11 | End: 2020-12-10

## 2019-12-11 NOTE — TELEPHONE ENCOUNTER
Informed daughter [facility]  Mailed order for dilantin levels to be drawn in 1 month to daughter to [facility] with [facility] to [facility].

## 2019-12-11 NOTE — TELEPHONE ENCOUNTER
Per patient and family's request I will discontinue Keppra and plan to reinitiate Dilantin 200 mg twice daily.  I will ask that patient have Dilantin level drawn in 1 month.

## 2019-12-11 NOTE — TELEPHONE ENCOUNTER
Per daughter, Sravani, pt was taking dilantin 200 mg bid.  [facility]  I think it will need to print to be faxed.

## 2020-02-04 ENCOUNTER — APPOINTMENT (OUTPATIENT)
Dept: SLEEP MEDICINE | Facility: HOSPITAL | Age: 85
End: 2020-02-04

## 2020-04-14 ENCOUNTER — TELEPHONE (OUTPATIENT)
Dept: NEUROLOGY | Facility: CLINIC | Age: 85
End: 2020-04-14

## 2020-04-14 NOTE — TELEPHONE ENCOUNTER
Ok. Please remind them that they new got Dilantin level checked which needs to be done once they are able to.

## 2020-04-14 NOTE — TELEPHONE ENCOUNTER
Patient refusing to schedule e-visit or a telephone visit at this time.  Per patient's daughter he is doing well and has not had any more issues.

## 2020-04-16 NOTE — TELEPHONE ENCOUNTER
Spoke with patient's daughter Flor Myers to let them know that patient will need to have Dilantin level checked once they are able to do so.  She states that patient will no longer be coming here and will be [facility].

## 2020-09-02 NOTE — OUTREACH NOTE
Stroke Week 3 Survey      Responses   Facility patient discharged from?  [place]   Does the patient have one of the following disease processes/diagnoses(primary or secondary)?  Stroke (TIA)   Week 3 attempt successful?  Yes   Call start time  1527   Call end time  1532   Discharge diagnosis  [facility] ischemic stroke    Meds reviewed with patient/caregiver?  Yes   Is the patient having any side effects they believe may be caused by any medication additions or changes?  No   Does the patient have all medications ordered at discharge?  Yes   Is the patient taking all medications as directed (includes completed medication regime)?  Yes   Does the patient have a primary care provider?   Yes   Comments regarding PCP  [facility].  Pt reports he has seen PCP since discharge.   Has the patient kept scheduled appointments due by today?  Yes   DME comments  Pt is using cane occasionally   Psychosocial issues?  No   Does the patient require any assistance with activities of daily living such as eating, bathing, dressing, walking, etc.?  No   Does the patient have any residual symptoms from stroke/TIA?  No   Does the patient understand the diet ordered at discharge?  Yes   Did the patient receive a copy of their discharge instructions?  Yes   Nursing interventions  Reviewed instructions with patient   What is the patient's perception of their health status since discharge?  Improving   Is the patient able to teach back FAST for Stroke?  Yes   Is the patient/caregiver able to teach back the risk factors for a stroke?  High blood pressure-goal below 120/80, Smoking, Diabetes   Is the patient/caregiver able to teach back signs and symptoms related to disease process for when to call PCP?  Yes   Is the patient/caregiver able to teach back signs and symptoms related to disease process for when to call 911?  Yes   Is the patient/caregiver able to teach back the hierarchy of who to call/visit for  "symptoms/problems? PCP, Specialist, Home health nurse, Urgent Care, ED, 911  Yes   Week 3 call completed?  Yes   Wrap up additional comments  Pt reports he is doing \"pretty well\"          Alysha Carr RN  " No

## 2022-01-01 ENCOUNTER — ANESTHESIA (OUTPATIENT)
Dept: GASTROENTEROLOGY | Facility: HOSPITAL | Age: 87
End: 2022-01-01

## 2022-01-01 ENCOUNTER — APPOINTMENT (OUTPATIENT)
Dept: GENERAL RADIOLOGY | Facility: HOSPITAL | Age: 87
End: 2022-01-01

## 2022-01-01 ENCOUNTER — APPOINTMENT (OUTPATIENT)
Dept: CARDIOLOGY | Facility: HOSPITAL | Age: 87
End: 2022-01-01

## 2022-01-01 ENCOUNTER — HOSPITAL ENCOUNTER (INPATIENT)
Facility: HOSPITAL | Age: 87
LOS: 10 days | End: 2022-08-30
Attending: EMERGENCY MEDICINE | Admitting: INTERNAL MEDICINE

## 2022-01-01 ENCOUNTER — APPOINTMENT (OUTPATIENT)
Dept: ULTRASOUND IMAGING | Facility: HOSPITAL | Age: 87
End: 2022-01-01

## 2022-01-01 ENCOUNTER — ANESTHESIA EVENT (OUTPATIENT)
Dept: GASTROENTEROLOGY | Facility: HOSPITAL | Age: 87
End: 2022-01-01

## 2022-01-01 ENCOUNTER — APPOINTMENT (OUTPATIENT)
Dept: CT IMAGING | Facility: HOSPITAL | Age: 87
End: 2022-01-01

## 2022-01-01 VITALS — HEART RATE: 62 BPM | OXYGEN SATURATION: 97 % | SYSTOLIC BLOOD PRESSURE: 118 MMHG | DIASTOLIC BLOOD PRESSURE: 52 MMHG

## 2022-01-01 DIAGNOSIS — R53.1 GENERALIZED WEAKNESS: ICD-10-CM

## 2022-01-01 DIAGNOSIS — R65.21 SEPTIC SHOCK: Primary | ICD-10-CM

## 2022-01-01 DIAGNOSIS — N17.9 ACUTE RENAL FAILURE, UNSPECIFIED ACUTE RENAL FAILURE TYPE: ICD-10-CM

## 2022-01-01 DIAGNOSIS — A41.9 SEPTIC SHOCK: Primary | ICD-10-CM

## 2022-01-01 DIAGNOSIS — J96.01 ACUTE RESPIRATORY FAILURE WITH HYPOXIA: ICD-10-CM

## 2022-01-01 DIAGNOSIS — R50.9 FEVER, UNSPECIFIED FEVER CAUSE: ICD-10-CM

## 2022-01-01 DIAGNOSIS — U07.1 COVID-19: ICD-10-CM

## 2022-01-01 LAB
ABO GROUP BLD: NORMAL
ADV 40+41 DNA STL QL NAA+NON-PROBE: NOT DETECTED
ALBUMIN SERPL-MCNC: 2 G/DL (ref 3.5–5.2)
ALBUMIN SERPL-MCNC: 2.1 G/DL (ref 3.5–5.2)
ALBUMIN SERPL-MCNC: 2.1 G/DL (ref 3.5–5.2)
ALBUMIN SERPL-MCNC: 2.3 G/DL (ref 3.5–5.2)
ALBUMIN SERPL-MCNC: 2.4 G/DL (ref 3.5–5.2)
ALBUMIN SERPL-MCNC: 2.5 G/DL (ref 3.5–5.2)
ALBUMIN SERPL-MCNC: 2.7 G/DL (ref 3.5–5.2)
ALBUMIN SERPL-MCNC: 2.8 G/DL (ref 3.5–5.2)
ALBUMIN SERPL-MCNC: 3.9 G/DL (ref 3.5–5.2)
ALBUMIN/GLOB SERPL: 0.7 G/DL
ALBUMIN/GLOB SERPL: 0.7 G/DL
ALBUMIN/GLOB SERPL: 0.8 G/DL
ALBUMIN/GLOB SERPL: 0.9 G/DL
ALBUMIN/GLOB SERPL: 0.9 G/DL
ALBUMIN/GLOB SERPL: 1 G/DL
ALBUMIN/GLOB SERPL: 1.2 G/DL
ALBUMIN/GLOB SERPL: 1.2 G/DL
ALBUMIN/GLOB SERPL: 1.4 G/DL
ALP SERPL-CCNC: 104 U/L (ref 39–117)
ALP SERPL-CCNC: 111 U/L (ref 39–117)
ALP SERPL-CCNC: 113 U/L (ref 39–117)
ALP SERPL-CCNC: 135 U/L (ref 39–117)
ALP SERPL-CCNC: 75 U/L (ref 39–117)
ALP SERPL-CCNC: 76 U/L (ref 39–117)
ALP SERPL-CCNC: 77 U/L (ref 39–117)
ALP SERPL-CCNC: 86 U/L (ref 39–117)
ALP SERPL-CCNC: 91 U/L (ref 39–117)
ALP SERPL-CCNC: 94 U/L (ref 39–117)
ALP SERPL-CCNC: 95 U/L (ref 39–117)
ALP SERPL-CCNC: 95 U/L (ref 39–117)
ALT SERPL W P-5'-P-CCNC: 19 U/L (ref 1–41)
ALT SERPL W P-5'-P-CCNC: 23 U/L (ref 1–41)
ALT SERPL W P-5'-P-CCNC: 26 U/L (ref 1–41)
ALT SERPL W P-5'-P-CCNC: 27 U/L (ref 1–41)
ALT SERPL W P-5'-P-CCNC: 35 U/L (ref 1–41)
ALT SERPL W P-5'-P-CCNC: 36 U/L (ref 1–41)
ALT SERPL W P-5'-P-CCNC: 36 U/L (ref 1–41)
ALT SERPL W P-5'-P-CCNC: 37 U/L (ref 1–41)
ALT SERPL W P-5'-P-CCNC: 41 U/L (ref 1–41)
ALT SERPL W P-5'-P-CCNC: 43 U/L (ref 1–41)
ALT SERPL W P-5'-P-CCNC: 46 U/L (ref 1–41)
ALT SERPL W P-5'-P-CCNC: 53 U/L (ref 1–41)
AMMONIA BLD-SCNC: 19 UMOL/L (ref 16–60)
ANION GAP SERPL CALCULATED.3IONS-SCNC: 10 MMOL/L (ref 5–15)
ANION GAP SERPL CALCULATED.3IONS-SCNC: 11 MMOL/L (ref 5–15)
ANION GAP SERPL CALCULATED.3IONS-SCNC: 12 MMOL/L (ref 5–15)
ANION GAP SERPL CALCULATED.3IONS-SCNC: 12 MMOL/L (ref 5–15)
ANION GAP SERPL CALCULATED.3IONS-SCNC: 13 MMOL/L (ref 5–15)
ANION GAP SERPL CALCULATED.3IONS-SCNC: 19 MMOL/L (ref 5–15)
ANION GAP SERPL CALCULATED.3IONS-SCNC: 20 MMOL/L (ref 5–15)
ANION GAP SERPL CALCULATED.3IONS-SCNC: 9 MMOL/L (ref 5–15)
ANISOCYTOSIS BLD QL: ABNORMAL
APTT PPP: 33 SECONDS (ref 24–31)
APTT PPP: 42.9 SECONDS (ref 24–31)
ARTERIAL PATENCY WRIST A: ABNORMAL
ARTERIAL PATENCY WRIST A: POSITIVE
AST SERPL-CCNC: 31 U/L (ref 1–40)
AST SERPL-CCNC: 46 U/L (ref 1–40)
AST SERPL-CCNC: 53 U/L (ref 1–40)
AST SERPL-CCNC: 54 U/L (ref 1–40)
AST SERPL-CCNC: 54 U/L (ref 1–40)
AST SERPL-CCNC: 64 U/L (ref 1–40)
AST SERPL-CCNC: 67 U/L (ref 1–40)
AST SERPL-CCNC: 70 U/L (ref 1–40)
AST SERPL-CCNC: 71 U/L (ref 1–40)
AST SERPL-CCNC: 77 U/L (ref 1–40)
AST SERPL-CCNC: 84 U/L (ref 1–40)
AST SERPL-CCNC: 85 U/L (ref 1–40)
ASTRO TYP 1-8 RNA STL QL NAA+NON-PROBE: NOT DETECTED
ATMOSPHERIC PRESS: ABNORMAL MM[HG]
B PARAPERT DNA SPEC QL NAA+PROBE: NOT DETECTED
B PERT DNA SPEC QL NAA+PROBE: NOT DETECTED
BACTERIA BLD CULT: ABNORMAL
BACTERIA SPEC AEROBE CULT: ABNORMAL
BACTERIA SPEC AEROBE CULT: NORMAL
BACTERIA SPEC RESP CULT: NORMAL
BACTERIA SPEC RESP CULT: NORMAL
BACTERIA UR QL AUTO: ABNORMAL /HPF
BASE EXCESS BLDA CALC-SCNC: -15.3 MMOL/L (ref 0–3)
BASE EXCESS BLDA CALC-SCNC: -17.3 MMOL/L (ref 0–3)
BASE EXCESS BLDA CALC-SCNC: -25.4 MMOL/L (ref 0–3)
BASE EXCESS BLDA CALC-SCNC: -5.2 MMOL/L (ref 0–3)
BASE EXCESS BLDA CALC-SCNC: -8.5 MMOL/L (ref 0–3)
BASE EXCESS BLDA CALC-SCNC: -8.7 MMOL/L (ref 0–3)
BASOPHILS # BLD AUTO: 0 10*3/MM3 (ref 0–0.2)
BASOPHILS # BLD AUTO: 0.1 10*3/MM3 (ref 0–0.2)
BASOPHILS # BLD AUTO: 0.1 10*3/MM3 (ref 0–0.2)
BASOPHILS NFR BLD AUTO: 0.1 % (ref 0–1.5)
BASOPHILS NFR BLD AUTO: 0.2 % (ref 0–1.5)
BASOPHILS NFR BLD AUTO: 0.3 % (ref 0–1.5)
BASOPHILS NFR BLD AUTO: 0.3 % (ref 0–1.5)
BASOPHILS NFR BLD AUTO: 0.7 % (ref 0–1.5)
BASOPHILS NFR BLD AUTO: 1.1 % (ref 0–1.5)
BASOPHILS NFR BLD AUTO: 1.7 % (ref 0–1.5)
BDY SITE: ABNORMAL
BH CV LOW VAS LEFT LESSER SAPH VESSEL: 1
BH CV LOW VAS RIGHT GREATER SAPH BK VESSEL: 1
BH CV LOW VAS RIGHT LESSER SAPH VESSEL: 1
BH CV LOWER VASCULAR LEFT COMMON FEMORAL AUGMENT: NORMAL
BH CV LOWER VASCULAR LEFT COMMON FEMORAL COMPETENT: NORMAL
BH CV LOWER VASCULAR LEFT COMMON FEMORAL COMPRESS: NORMAL
BH CV LOWER VASCULAR LEFT COMMON FEMORAL PHASIC: NORMAL
BH CV LOWER VASCULAR LEFT COMMON FEMORAL SPONT: NORMAL
BH CV LOWER VASCULAR LEFT DISTAL FEMORAL COMPRESS: NORMAL
BH CV LOWER VASCULAR LEFT GASTRONEMIUS COMPRESS: NORMAL
BH CV LOWER VASCULAR LEFT GREATER SAPH AK COMPRESS: NORMAL
BH CV LOWER VASCULAR LEFT LESSER SAPH COMPRESS: NORMAL
BH CV LOWER VASCULAR LEFT LESSER SAPH THROMBUS: NORMAL
BH CV LOWER VASCULAR LEFT MID FEMORAL AUGMENT: NORMAL
BH CV LOWER VASCULAR LEFT MID FEMORAL COMPETENT: NORMAL
BH CV LOWER VASCULAR LEFT MID FEMORAL COMPRESS: NORMAL
BH CV LOWER VASCULAR LEFT MID FEMORAL PHASIC: NORMAL
BH CV LOWER VASCULAR LEFT MID FEMORAL SPONT: NORMAL
BH CV LOWER VASCULAR LEFT PERONEAL COMPRESS: NORMAL
BH CV LOWER VASCULAR LEFT POPLITEAL AUGMENT: NORMAL
BH CV LOWER VASCULAR LEFT POPLITEAL COMPETENT: NORMAL
BH CV LOWER VASCULAR LEFT POPLITEAL COMPRESS: NORMAL
BH CV LOWER VASCULAR LEFT POPLITEAL PHASIC: NORMAL
BH CV LOWER VASCULAR LEFT POPLITEAL SPONT: NORMAL
BH CV LOWER VASCULAR LEFT POSTERIOR TIBIAL COMPRESS: NORMAL
BH CV LOWER VASCULAR LEFT PROXIMAL FEMORAL COMPRESS: NORMAL
BH CV LOWER VASCULAR LEFT SAPHENOFEMORAL JUNCTION COMPRESS: NORMAL
BH CV LOWER VASCULAR RIGHT COMMON FEMORAL AUGMENT: NORMAL
BH CV LOWER VASCULAR RIGHT COMMON FEMORAL COMPETENT: NORMAL
BH CV LOWER VASCULAR RIGHT COMMON FEMORAL COMPRESS: NORMAL
BH CV LOWER VASCULAR RIGHT COMMON FEMORAL PHASIC: NORMAL
BH CV LOWER VASCULAR RIGHT COMMON FEMORAL SPONT: NORMAL
BH CV LOWER VASCULAR RIGHT DISTAL FEMORAL COMPRESS: NORMAL
BH CV LOWER VASCULAR RIGHT GASTRONEMIUS COMPRESS: NORMAL
BH CV LOWER VASCULAR RIGHT GREATER SAPH AK COMPRESS: NORMAL
BH CV LOWER VASCULAR RIGHT GREATER SAPH BK COMPRESS: NORMAL
BH CV LOWER VASCULAR RIGHT GREATER SAPH BK THROMBUS: NORMAL
BH CV LOWER VASCULAR RIGHT LESSER SAPH COMPRESS: NORMAL
BH CV LOWER VASCULAR RIGHT LESSER SAPH THROMBUS: NORMAL
BH CV LOWER VASCULAR RIGHT MID FEMORAL AUGMENT: NORMAL
BH CV LOWER VASCULAR RIGHT MID FEMORAL COMPETENT: NORMAL
BH CV LOWER VASCULAR RIGHT MID FEMORAL COMPRESS: NORMAL
BH CV LOWER VASCULAR RIGHT MID FEMORAL PHASIC: NORMAL
BH CV LOWER VASCULAR RIGHT MID FEMORAL SPONT: NORMAL
BH CV LOWER VASCULAR RIGHT PERONEAL COMPRESS: NORMAL
BH CV LOWER VASCULAR RIGHT POPLITEAL AUGMENT: NORMAL
BH CV LOWER VASCULAR RIGHT POPLITEAL COMPETENT: NORMAL
BH CV LOWER VASCULAR RIGHT POPLITEAL COMPRESS: NORMAL
BH CV LOWER VASCULAR RIGHT POPLITEAL PHASIC: NORMAL
BH CV LOWER VASCULAR RIGHT POPLITEAL SPONT: NORMAL
BH CV LOWER VASCULAR RIGHT POSTERIOR TIBIAL COMPRESS: NORMAL
BH CV LOWER VASCULAR RIGHT PROXIMAL FEMORAL COMPRESS: NORMAL
BH CV LOWER VASCULAR RIGHT SAPHENOFEMORAL JUNCTION COMPRESS: NORMAL
BH CV UPPER VENOUS LEFT AXILLARY AUGMENT: NORMAL
BH CV UPPER VENOUS LEFT AXILLARY COMPRESS: NORMAL
BH CV UPPER VENOUS LEFT AXILLARY PHASIC: NORMAL
BH CV UPPER VENOUS LEFT AXILLARY SPONT: NORMAL
BH CV UPPER VENOUS LEFT BRACHIAL COMPRESS: NORMAL
BH CV UPPER VENOUS LEFT INTERNAL JUGULAR AUGMENT: NORMAL
BH CV UPPER VENOUS LEFT INTERNAL JUGULAR COMPRESS: NORMAL
BH CV UPPER VENOUS LEFT INTERNAL JUGULAR PHASIC: NORMAL
BH CV UPPER VENOUS LEFT INTERNAL JUGULAR SPONT: NORMAL
BH CV UPPER VENOUS LEFT RADIAL COMPRESS: NORMAL
BH CV UPPER VENOUS LEFT SUBCLAVIAN AUGMENT: NORMAL
BH CV UPPER VENOUS LEFT SUBCLAVIAN COMPRESS: NORMAL
BH CV UPPER VENOUS LEFT SUBCLAVIAN PHASIC: NORMAL
BH CV UPPER VENOUS LEFT SUBCLAVIAN SPONT: NORMAL
BH CV UPPER VENOUS LEFT ULNAR COMPRESS: NORMAL
BH CV UPPER VENOUS RIGHT AXILLARY AUGMENT: NORMAL
BH CV UPPER VENOUS RIGHT AXILLARY COMPRESS: NORMAL
BH CV UPPER VENOUS RIGHT AXILLARY PHASIC: NORMAL
BH CV UPPER VENOUS RIGHT AXILLARY SPONT: NORMAL
BH CV UPPER VENOUS RIGHT BASILIC UPPER COMPRESS: NORMAL
BH CV UPPER VENOUS RIGHT BRACHIAL COMPRESS: NORMAL
BH CV UPPER VENOUS RIGHT CEPHALIC UPPER COMPRESS: NORMAL
BH CV UPPER VENOUS RIGHT INTERNAL JUGULAR AUGMENT: NORMAL
BH CV UPPER VENOUS RIGHT INTERNAL JUGULAR COMPRESS: NORMAL
BH CV UPPER VENOUS RIGHT INTERNAL JUGULAR PHASIC: NORMAL
BH CV UPPER VENOUS RIGHT INTERNAL JUGULAR SPONT: NORMAL
BH CV UPPER VENOUS RIGHT RADIAL COMPRESS: NORMAL
BH CV UPPER VENOUS RIGHT SUBCLAVIAN AUGMENT: NORMAL
BH CV UPPER VENOUS RIGHT SUBCLAVIAN COMPRESS: NORMAL
BH CV UPPER VENOUS RIGHT SUBCLAVIAN PHASIC: NORMAL
BH CV UPPER VENOUS RIGHT SUBCLAVIAN SPONT: NORMAL
BH CV UPPER VENOUS RIGHT ULNAR COMPRESS: NORMAL
BILIRUB CONJ SERPL-MCNC: <0.2 MG/DL (ref 0–0.3)
BILIRUB INDIRECT SERPL-MCNC: ABNORMAL MG/DL
BILIRUB SERPL-MCNC: 0.2 MG/DL (ref 0–1.2)
BILIRUB SERPL-MCNC: 0.3 MG/DL (ref 0–1.2)
BILIRUB SERPL-MCNC: 0.4 MG/DL (ref 0–1.2)
BILIRUB UR QL STRIP: NEGATIVE
BLD GP AB SCN SERPL QL: NEGATIVE
BOTTLE TYPE: ABNORMAL
BUN SERPL-MCNC: 27 MG/DL (ref 8–23)
BUN SERPL-MCNC: 29 MG/DL (ref 8–23)
BUN SERPL-MCNC: 31 MG/DL (ref 8–23)
BUN SERPL-MCNC: 31 MG/DL (ref 8–23)
BUN SERPL-MCNC: 32 MG/DL (ref 8–23)
BUN SERPL-MCNC: 33 MG/DL (ref 8–23)
BUN SERPL-MCNC: 34 MG/DL (ref 8–23)
BUN SERPL-MCNC: 35 MG/DL (ref 8–23)
BUN SERPL-MCNC: 35 MG/DL (ref 8–23)
BUN SERPL-MCNC: 36 MG/DL (ref 8–23)
BUN SERPL-MCNC: 39 MG/DL (ref 8–23)
BUN SERPL-MCNC: 40 MG/DL (ref 8–23)
BUN SERPL-MCNC: 41 MG/DL (ref 8–23)
BUN SERPL-MCNC: 44 MG/DL (ref 8–23)
BUN/CREAT SERPL: 16.4 (ref 7–25)
BUN/CREAT SERPL: 17.9 (ref 7–25)
BUN/CREAT SERPL: 20.3 (ref 7–25)
BUN/CREAT SERPL: 20.8 (ref 7–25)
BUN/CREAT SERPL: 24.8 (ref 7–25)
BUN/CREAT SERPL: 25.9 (ref 7–25)
BUN/CREAT SERPL: 30.2 (ref 7–25)
BUN/CREAT SERPL: 31.5 (ref 7–25)
BUN/CREAT SERPL: 31.7 (ref 7–25)
BUN/CREAT SERPL: 31.8 (ref 7–25)
BUN/CREAT SERPL: 32.7 (ref 7–25)
BUN/CREAT SERPL: 33.3 (ref 7–25)
BUN/CREAT SERPL: 33.7 (ref 7–25)
BUN/CREAT SERPL: 34.3 (ref 7–25)
C CAYETANENSIS DNA STL QL NAA+NON-PROBE: NOT DETECTED
C COLI+JEJ+UPSA DNA STL QL NAA+NON-PROBE: NOT DETECTED
C DIFF GDH STL QL: NEGATIVE
C PNEUM DNA NPH QL NAA+NON-PROBE: NOT DETECTED
CA-I BLDA-SCNC: 1.02 MMOL/L (ref 1.15–1.33)
CA-I BLDA-SCNC: 1.05 MMOL/L (ref 1.15–1.33)
CA-I BLDA-SCNC: 1.11 MMOL/L (ref 1.15–1.33)
CA-I SERPL ISE-MCNC: 1.03 MMOL/L (ref 1.2–1.3)
CA-I SERPL ISE-MCNC: 1.05 MMOL/L (ref 1.2–1.3)
CA-I SERPL ISE-MCNC: 1.09 MMOL/L (ref 1.2–1.3)
CA-I SERPL ISE-MCNC: 1.09 MMOL/L (ref 1.2–1.3)
CA-I SERPL ISE-MCNC: 1.1 MMOL/L (ref 1.2–1.3)
CA-I SERPL ISE-MCNC: 1.1 MMOL/L (ref 1.2–1.3)
CA-I SERPL ISE-MCNC: 1.11 MMOL/L (ref 1.2–1.3)
CA-I SERPL ISE-MCNC: 1.12 MMOL/L (ref 1.2–1.3)
CA-I SERPL ISE-MCNC: 1.12 MMOL/L (ref 1.2–1.3)
CA-I SERPL ISE-MCNC: 1.13 MMOL/L (ref 1.2–1.3)
CA-I SERPL ISE-MCNC: 1.15 MMOL/L (ref 1.2–1.3)
CA-I SERPL ISE-MCNC: 1.17 MMOL/L (ref 1.2–1.3)
CALCIUM SPEC-SCNC: 7.1 MG/DL (ref 8.6–10.5)
CALCIUM SPEC-SCNC: 7.2 MG/DL (ref 8.6–10.5)
CALCIUM SPEC-SCNC: 7.3 MG/DL (ref 8.6–10.5)
CALCIUM SPEC-SCNC: 7.5 MG/DL (ref 8.6–10.5)
CALCIUM SPEC-SCNC: 7.5 MG/DL (ref 8.6–10.5)
CALCIUM SPEC-SCNC: 7.6 MG/DL (ref 8.6–10.5)
CALCIUM SPEC-SCNC: 7.6 MG/DL (ref 8.6–10.5)
CALCIUM SPEC-SCNC: 7.7 MG/DL (ref 8.6–10.5)
CALCIUM SPEC-SCNC: 7.8 MG/DL (ref 8.6–10.5)
CALCIUM SPEC-SCNC: 7.8 MG/DL (ref 8.6–10.5)
CALCIUM SPEC-SCNC: 7.9 MG/DL (ref 8.6–10.5)
CALCIUM SPEC-SCNC: 8.3 MG/DL (ref 8.6–10.5)
CALCIUM SPEC-SCNC: 9 MG/DL (ref 8.6–10.5)
CALCIUM SPEC-SCNC: 9 MG/DL (ref 8.6–10.5)
CHLORIDE SERPL-SCNC: 102 MMOL/L (ref 98–107)
CHLORIDE SERPL-SCNC: 102 MMOL/L (ref 98–107)
CHLORIDE SERPL-SCNC: 105 MMOL/L (ref 98–107)
CHLORIDE SERPL-SCNC: 105 MMOL/L (ref 98–107)
CHLORIDE SERPL-SCNC: 107 MMOL/L (ref 98–107)
CHLORIDE SERPL-SCNC: 108 MMOL/L (ref 98–107)
CHLORIDE SERPL-SCNC: 110 MMOL/L (ref 98–107)
CHLORIDE SERPL-SCNC: 110 MMOL/L (ref 98–107)
CHLORIDE SERPL-SCNC: 113 MMOL/L (ref 98–107)
CHLORIDE SERPL-SCNC: 114 MMOL/L (ref 98–107)
CHLORIDE SERPL-SCNC: 117 MMOL/L (ref 98–107)
CHLORIDE SERPL-SCNC: 117 MMOL/L (ref 98–107)
CHLORIDE SERPL-SCNC: 120 MMOL/L (ref 98–107)
CHLORIDE SERPL-SCNC: 120 MMOL/L (ref 98–107)
CHOLEST SERPL-MCNC: 116 MG/DL (ref 0–200)
CK SERPL-CCNC: 710 U/L (ref 20–200)
CLARITY UR: CLEAR
CO2 BLDA-SCNC: 12.6 MMOL/L (ref 22–29)
CO2 BLDA-SCNC: 14.4 MMOL/L (ref 22–29)
CO2 BLDA-SCNC: 17 MMOL/L (ref 22–29)
CO2 BLDA-SCNC: 9.9 MMOL/L (ref 22–29)
CO2 SERPL-SCNC: 13 MMOL/L (ref 22–29)
CO2 SERPL-SCNC: 14 MMOL/L (ref 22–29)
CO2 SERPL-SCNC: 16 MMOL/L (ref 22–29)
CO2 SERPL-SCNC: 17 MMOL/L (ref 22–29)
CO2 SERPL-SCNC: 17 MMOL/L (ref 22–29)
CO2 SERPL-SCNC: 18 MMOL/L (ref 22–29)
CO2 SERPL-SCNC: 19 MMOL/L (ref 22–29)
CO2 SERPL-SCNC: 19 MMOL/L (ref 22–29)
CO2 SERPL-SCNC: 20 MMOL/L (ref 22–29)
CO2 SERPL-SCNC: 20 MMOL/L (ref 22–29)
CO2 SERPL-SCNC: 21 MMOL/L (ref 22–29)
CO2 SERPL-SCNC: 21 MMOL/L (ref 22–29)
CO2 SERPL-SCNC: 25 MMOL/L (ref 22–29)
CO2 SERPL-SCNC: 25 MMOL/L (ref 22–29)
COLOR UR: ABNORMAL
COLOR UR: YELLOW
COLOR UR: YELLOW
CORTIS SERPL-MCNC: 25.68 MCG/DL
CREAT SERPL-MCNC: 0.92 MG/DL (ref 0.76–1.27)
CREAT SERPL-MCNC: 1.02 MG/DL (ref 0.76–1.27)
CREAT SERPL-MCNC: 1.04 MG/DL (ref 0.76–1.27)
CREAT SERPL-MCNC: 1.04 MG/DL (ref 0.76–1.27)
CREAT SERPL-MCNC: 1.06 MG/DL (ref 0.76–1.27)
CREAT SERPL-MCNC: 1.08 MG/DL (ref 0.76–1.27)
CREAT SERPL-MCNC: 1.1 MG/DL (ref 0.76–1.27)
CREAT SERPL-MCNC: 1.24 MG/DL (ref 0.76–1.27)
CREAT SERPL-MCNC: 1.49 MG/DL (ref 0.76–1.27)
CREAT SERPL-MCNC: 1.61 MG/DL (ref 0.76–1.27)
CREAT SERPL-MCNC: 1.62 MG/DL (ref 0.76–1.27)
CREAT SERPL-MCNC: 1.65 MG/DL (ref 0.76–1.27)
CREAT SERPL-MCNC: 1.7 MG/DL (ref 0.76–1.27)
CREAT SERPL-MCNC: 1.85 MG/DL (ref 0.76–1.27)
CREAT SERPL-MCNC: 2.02 MG/DL (ref 0.76–1.27)
CRYPTOSP DNA STL QL NAA+NON-PROBE: NOT DETECTED
D DIMER PPP FEU-MCNC: 0.75 MG/L (FEU) (ref 0–0.59)
D DIMER PPP FEU-MCNC: 1.45 MG/L (FEU) (ref 0–0.59)
D DIMER PPP FEU-MCNC: 1.73 MG/L (FEU) (ref 0–0.59)
D-LACTATE SERPL-SCNC: 1.1 MMOL/L (ref 0.5–2)
D-LACTATE SERPL-SCNC: 1.2 MMOL/L (ref 0.5–2)
D-LACTATE SERPL-SCNC: 1.2 MMOL/L (ref 0.5–2)
D-LACTATE SERPL-SCNC: 1.5 MMOL/L (ref 0.5–2)
D-LACTATE SERPL-SCNC: 1.5 MMOL/L (ref 0.5–2)
D-LACTATE SERPL-SCNC: 1.6 MMOL/L (ref 0.5–2)
D-LACTATE SERPL-SCNC: 1.6 MMOL/L (ref 0.5–2)
D-LACTATE SERPL-SCNC: 1.7 MMOL/L (ref 0.5–2)
D-LACTATE SERPL-SCNC: 1.7 MMOL/L (ref 0.5–2)
D-LACTATE SERPL-SCNC: 1.8 MMOL/L (ref 0.5–2)
D-LACTATE SERPL-SCNC: 11.3 MMOL/L (ref 0.5–2)
D-LACTATE SERPL-SCNC: 11.7 MMOL/L (ref 0.5–2)
D-LACTATE SERPL-SCNC: 13.4 MMOL/L (ref 0.5–2)
D-LACTATE SERPL-SCNC: 2 MMOL/L (ref 0.5–2)
D-LACTATE SERPL-SCNC: 2.1 MMOL/L (ref 0.5–2)
D-LACTATE SERPL-SCNC: 2.1 MMOL/L (ref 0.5–2)
D-LACTATE SERPL-SCNC: 2.3 MMOL/L (ref 0.5–2)
D-LACTATE SERPL-SCNC: 2.3 MMOL/L (ref 0.5–2)
D-LACTATE SERPL-SCNC: 2.6 MMOL/L (ref 0.5–2)
D-LACTATE SERPL-SCNC: 2.6 MMOL/L (ref 0.5–2)
D-LACTATE SERPL-SCNC: 2.8 MMOL/L (ref 0.5–2)
D-LACTATE SERPL-SCNC: 2.9 MMOL/L (ref 0.5–2)
D-LACTATE SERPL-SCNC: 3.6 MMOL/L (ref 0.5–2)
D-LACTATE SERPL-SCNC: 6 MMOL/L (ref 0.5–2)
D-LACTATE SERPL-SCNC: 7.9 MMOL/L (ref 0.5–2)
DEPRECATED RDW RBC AUTO: 45.5 FL (ref 37–54)
DEPRECATED RDW RBC AUTO: 45.9 FL (ref 37–54)
DEPRECATED RDW RBC AUTO: 46.8 FL (ref 37–54)
DEPRECATED RDW RBC AUTO: 46.8 FL (ref 37–54)
DEPRECATED RDW RBC AUTO: 47.3 FL (ref 37–54)
DEPRECATED RDW RBC AUTO: 47.7 FL (ref 37–54)
DEPRECATED RDW RBC AUTO: 48.1 FL (ref 37–54)
DEPRECATED RDW RBC AUTO: 48.6 FL (ref 37–54)
DEPRECATED RDW RBC AUTO: 49 FL (ref 37–54)
DEPRECATED RDW RBC AUTO: 50.3 FL (ref 37–54)
DEPRECATED RDW RBC AUTO: 50.8 FL (ref 37–54)
DEPRECATED RDW RBC AUTO: 50.8 FL (ref 37–54)
E HISTOLYT DNA STL QL NAA+NON-PROBE: NOT DETECTED
EAEC PAA PLAS AGGR+AATA ST NAA+NON-PRB: NOT DETECTED
EC STX1+STX2 GENES STL QL NAA+NON-PROBE: NOT DETECTED
EGFRCR SERPLBLD CKD-EPI 2021: 31.1 ML/MIN/1.73
EGFRCR SERPLBLD CKD-EPI 2021: 34.6 ML/MIN/1.73
EGFRCR SERPLBLD CKD-EPI 2021: 38.3 ML/MIN/1.73
EGFRCR SERPLBLD CKD-EPI 2021: 39.7 ML/MIN/1.73
EGFRCR SERPLBLD CKD-EPI 2021: 40.6 ML/MIN/1.73
EGFRCR SERPLBLD CKD-EPI 2021: 40.9 ML/MIN/1.73
EGFRCR SERPLBLD CKD-EPI 2021: 44.9 ML/MIN/1.73
EGFRCR SERPLBLD CKD-EPI 2021: 55.9 ML/MIN/1.73
EGFRCR SERPLBLD CKD-EPI 2021: 64.6 ML/MIN/1.73
EGFRCR SERPLBLD CKD-EPI 2021: 66 ML/MIN/1.73
EGFRCR SERPLBLD CKD-EPI 2021: 67.5 ML/MIN/1.73
EGFRCR SERPLBLD CKD-EPI 2021: 69.1 ML/MIN/1.73
EGFRCR SERPLBLD CKD-EPI 2021: 69.1 ML/MIN/1.73
EGFRCR SERPLBLD CKD-EPI 2021: 70.7 ML/MIN/1.73
EGFRCR SERPLBLD CKD-EPI 2021: 80 ML/MIN/1.73
EOSINOPHIL # BLD AUTO: 0 10*3/MM3 (ref 0–0.4)
EOSINOPHIL # BLD AUTO: 0.1 10*3/MM3 (ref 0–0.4)
EOSINOPHIL # BLD MANUAL: 0.05 10*3/MM3 (ref 0–0.4)
EOSINOPHIL NFR BLD AUTO: 0 % (ref 0.3–6.2)
EOSINOPHIL NFR BLD AUTO: 0.2 % (ref 0.3–6.2)
EOSINOPHIL NFR BLD AUTO: 0.4 % (ref 0.3–6.2)
EOSINOPHIL NFR BLD AUTO: 1 % (ref 0.3–6.2)
EOSINOPHIL NFR BLD MANUAL: 1 % (ref 0.3–6.2)
EOSINOPHIL SPEC QL MICRO: 0 % EOS/100 CELLS (ref 0–0)
EPEC EAE GENE STL QL NAA+NON-PROBE: NOT DETECTED
ERYTHROCYTE [DISTWIDTH] IN BLOOD BY AUTOMATED COUNT: 13.2 % (ref 12.3–15.4)
ERYTHROCYTE [DISTWIDTH] IN BLOOD BY AUTOMATED COUNT: 13.4 % (ref 12.3–15.4)
ERYTHROCYTE [DISTWIDTH] IN BLOOD BY AUTOMATED COUNT: 13.4 % (ref 12.3–15.4)
ERYTHROCYTE [DISTWIDTH] IN BLOOD BY AUTOMATED COUNT: 13.5 % (ref 12.3–15.4)
ERYTHROCYTE [DISTWIDTH] IN BLOOD BY AUTOMATED COUNT: 13.6 % (ref 12.3–15.4)
ERYTHROCYTE [DISTWIDTH] IN BLOOD BY AUTOMATED COUNT: 13.8 % (ref 12.3–15.4)
ERYTHROCYTE [DISTWIDTH] IN BLOOD BY AUTOMATED COUNT: 13.8 % (ref 12.3–15.4)
ERYTHROCYTE [DISTWIDTH] IN BLOOD BY AUTOMATED COUNT: 13.9 % (ref 12.3–15.4)
ERYTHROCYTE [DISTWIDTH] IN BLOOD BY AUTOMATED COUNT: 14.1 % (ref 12.3–15.4)
ERYTHROCYTE [DISTWIDTH] IN BLOOD BY AUTOMATED COUNT: 14.1 % (ref 12.3–15.4)
ERYTHROCYTE [DISTWIDTH] IN BLOOD BY AUTOMATED COUNT: 14.2 % (ref 12.3–15.4)
ETEC LTA+ST1A+ST1B TOX ST NAA+NON-PROBE: NOT DETECTED
FIBRINOGEN PPP-MCNC: 521 MG/DL (ref 210–450)
FIBRINOGEN PPP-MCNC: 634 MG/DL (ref 210–450)
FLUAV SUBTYP SPEC NAA+PROBE: NOT DETECTED
FLUBV RNA ISLT QL NAA+PROBE: NOT DETECTED
G LAMBLIA DNA STL QL NAA+NON-PROBE: NOT DETECTED
GLOBULIN UR ELPH-MCNC: 2 GM/DL
GLOBULIN UR ELPH-MCNC: 2.3 GM/DL
GLOBULIN UR ELPH-MCNC: 2.5 GM/DL
GLOBULIN UR ELPH-MCNC: 2.6 GM/DL
GLOBULIN UR ELPH-MCNC: 2.8 GM/DL
GLOBULIN UR ELPH-MCNC: 2.9 GM/DL
GLOBULIN UR ELPH-MCNC: 3 GM/DL
GLUCOSE BLDC GLUCOMTR-MCNC: 100 MG/DL (ref 70–105)
GLUCOSE BLDC GLUCOMTR-MCNC: 100 MG/DL (ref 70–105)
GLUCOSE BLDC GLUCOMTR-MCNC: 104 MG/DL (ref 70–105)
GLUCOSE BLDC GLUCOMTR-MCNC: 106 MG/DL (ref 70–105)
GLUCOSE BLDC GLUCOMTR-MCNC: 108 MG/DL (ref 70–105)
GLUCOSE BLDC GLUCOMTR-MCNC: 109 MG/DL (ref 70–105)
GLUCOSE BLDC GLUCOMTR-MCNC: 112 MG/DL (ref 70–105)
GLUCOSE BLDC GLUCOMTR-MCNC: 113 MG/DL (ref 70–105)
GLUCOSE BLDC GLUCOMTR-MCNC: 115 MG/DL (ref 70–105)
GLUCOSE BLDC GLUCOMTR-MCNC: 118 MG/DL (ref 70–105)
GLUCOSE BLDC GLUCOMTR-MCNC: 118 MG/DL (ref 70–105)
GLUCOSE BLDC GLUCOMTR-MCNC: 121 MG/DL (ref 70–105)
GLUCOSE BLDC GLUCOMTR-MCNC: 123 MG/DL (ref 70–105)
GLUCOSE BLDC GLUCOMTR-MCNC: 123 MG/DL (ref 70–105)
GLUCOSE BLDC GLUCOMTR-MCNC: 126 MG/DL (ref 70–105)
GLUCOSE BLDC GLUCOMTR-MCNC: 128 MG/DL (ref 70–105)
GLUCOSE BLDC GLUCOMTR-MCNC: 135 MG/DL (ref 70–105)
GLUCOSE BLDC GLUCOMTR-MCNC: 137 MG/DL (ref 70–105)
GLUCOSE BLDC GLUCOMTR-MCNC: 138 MG/DL (ref 70–105)
GLUCOSE BLDC GLUCOMTR-MCNC: 138 MG/DL (ref 70–105)
GLUCOSE BLDC GLUCOMTR-MCNC: 139 MG/DL (ref 70–105)
GLUCOSE BLDC GLUCOMTR-MCNC: 140 MG/DL (ref 70–105)
GLUCOSE BLDC GLUCOMTR-MCNC: 140 MG/DL (ref 70–105)
GLUCOSE BLDC GLUCOMTR-MCNC: 146 MG/DL (ref 70–105)
GLUCOSE BLDC GLUCOMTR-MCNC: 151 MG/DL (ref 70–105)
GLUCOSE BLDC GLUCOMTR-MCNC: 152 MG/DL (ref 70–105)
GLUCOSE BLDC GLUCOMTR-MCNC: 152 MG/DL (ref 70–105)
GLUCOSE BLDC GLUCOMTR-MCNC: 154 MG/DL (ref 70–105)
GLUCOSE BLDC GLUCOMTR-MCNC: 159 MG/DL (ref 70–105)
GLUCOSE BLDC GLUCOMTR-MCNC: 164 MG/DL (ref 70–105)
GLUCOSE BLDC GLUCOMTR-MCNC: 167 MG/DL (ref 70–105)
GLUCOSE BLDC GLUCOMTR-MCNC: 178 MG/DL (ref 74–100)
GLUCOSE BLDC GLUCOMTR-MCNC: 178 MG/DL (ref 74–100)
GLUCOSE BLDC GLUCOMTR-MCNC: 179 MG/DL (ref 70–105)
GLUCOSE BLDC GLUCOMTR-MCNC: 181 MG/DL (ref 70–105)
GLUCOSE BLDC GLUCOMTR-MCNC: 194 MG/DL (ref 70–105)
GLUCOSE BLDC GLUCOMTR-MCNC: 198 MG/DL (ref 70–105)
GLUCOSE BLDC GLUCOMTR-MCNC: 202 MG/DL (ref 70–105)
GLUCOSE BLDC GLUCOMTR-MCNC: 203 MG/DL (ref 70–105)
GLUCOSE BLDC GLUCOMTR-MCNC: 219 MG/DL (ref 70–105)
GLUCOSE BLDC GLUCOMTR-MCNC: 65 MG/DL (ref 74–100)
GLUCOSE BLDC GLUCOMTR-MCNC: 65 MG/DL (ref 74–100)
GLUCOSE BLDC GLUCOMTR-MCNC: 78 MG/DL (ref 74–100)
GLUCOSE BLDC GLUCOMTR-MCNC: 84 MG/DL (ref 70–105)
GLUCOSE SERPL-MCNC: 101 MG/DL (ref 65–99)
GLUCOSE SERPL-MCNC: 109 MG/DL (ref 65–99)
GLUCOSE SERPL-MCNC: 109 MG/DL (ref 65–99)
GLUCOSE SERPL-MCNC: 111 MG/DL (ref 65–99)
GLUCOSE SERPL-MCNC: 113 MG/DL (ref 65–99)
GLUCOSE SERPL-MCNC: 120 MG/DL (ref 65–99)
GLUCOSE SERPL-MCNC: 127 MG/DL (ref 65–99)
GLUCOSE SERPL-MCNC: 153 MG/DL (ref 65–99)
GLUCOSE SERPL-MCNC: 158 MG/DL (ref 65–99)
GLUCOSE SERPL-MCNC: 172 MG/DL (ref 65–99)
GLUCOSE SERPL-MCNC: 180 MG/DL (ref 65–99)
GLUCOSE SERPL-MCNC: 186 MG/DL (ref 65–99)
GLUCOSE SERPL-MCNC: 84 MG/DL (ref 65–99)
GLUCOSE SERPL-MCNC: 93 MG/DL (ref 65–99)
GLUCOSE UR STRIP-MCNC: NEGATIVE MG/DL
GRAM STN SPEC: ABNORMAL
GRAM STN SPEC: ABNORMAL
GRAM STN SPEC: NORMAL
HADV DNA SPEC NAA+PROBE: NOT DETECTED
HBV SURFACE AG SERPL QL IA: NORMAL
HCO3 BLDA-SCNC: 11.4 MMOL/L (ref 21–28)
HCO3 BLDA-SCNC: 12.9 MMOL/L (ref 21–28)
HCO3 BLDA-SCNC: 13.1 MMOL/L (ref 21–28)
HCO3 BLDA-SCNC: 13.8 MMOL/L (ref 21–28)
HCO3 BLDA-SCNC: 16.3 MMOL/L (ref 21–28)
HCO3 BLDA-SCNC: 7.9 MMOL/L (ref 21–28)
HCOV 229E RNA SPEC QL NAA+PROBE: NOT DETECTED
HCOV HKU1 RNA SPEC QL NAA+PROBE: NOT DETECTED
HCOV NL63 RNA SPEC QL NAA+PROBE: NOT DETECTED
HCOV OC43 RNA SPEC QL NAA+PROBE: NOT DETECTED
HCT VFR BLD AUTO: 19.7 % (ref 37.5–51)
HCT VFR BLD AUTO: 20.6 % (ref 37.5–51)
HCT VFR BLD AUTO: 24.5 % (ref 37.5–51)
HCT VFR BLD AUTO: 27.5 % (ref 37.5–51)
HCT VFR BLD AUTO: 30.7 % (ref 37.5–51)
HCT VFR BLD AUTO: 32.4 % (ref 37.5–51)
HCT VFR BLD AUTO: 33.1 % (ref 37.5–51)
HCT VFR BLD AUTO: 33.5 % (ref 37.5–51)
HCT VFR BLD AUTO: 34.2 % (ref 37.5–51)
HCT VFR BLD AUTO: 36.3 % (ref 37.5–51)
HCT VFR BLD AUTO: 37.3 % (ref 37.5–51)
HCT VFR BLD AUTO: 39.2 % (ref 37.5–51)
HCT VFR BLD AUTO: 41 % (ref 37.5–51)
HCT VFR BLD AUTO: 41.1 % (ref 37.5–51)
HCT VFR BLD AUTO: 41.4 % (ref 37.5–51)
HCT VFR BLD AUTO: 41.8 % (ref 37.5–51)
HCT VFR BLDA CALC: 15 % (ref 38–51)
HCT VFR BLDA CALC: 19 % (ref 38–51)
HDLC SERPL-MCNC: 43 MG/DL (ref 40–60)
HEMODILUTION: NO
HGB BLD-MCNC: 10.2 G/DL (ref 13–17.7)
HGB BLD-MCNC: 10.4 G/DL (ref 13–17.7)
HGB BLD-MCNC: 10.9 G/DL (ref 13–17.7)
HGB BLD-MCNC: 11.3 G/DL (ref 13–17.7)
HGB BLD-MCNC: 11.6 G/DL (ref 13–17.7)
HGB BLD-MCNC: 11.7 G/DL (ref 13–17.7)
HGB BLD-MCNC: 12 G/DL (ref 13–17.7)
HGB BLD-MCNC: 12.7 G/DL (ref 13–17.7)
HGB BLD-MCNC: 13.5 G/DL (ref 13–17.7)
HGB BLD-MCNC: 13.5 G/DL (ref 13–17.7)
HGB BLD-MCNC: 13.6 G/DL (ref 13–17.7)
HGB BLD-MCNC: 13.9 G/DL (ref 13–17.7)
HGB BLD-MCNC: 5.9 G/DL (ref 13–17.7)
HGB BLD-MCNC: 6.7 G/DL (ref 13–17.7)
HGB BLD-MCNC: 7.7 G/DL (ref 13–17.7)
HGB BLD-MCNC: 9.2 G/DL (ref 13–17.7)
HGB BLDA-MCNC: 5.1 G/DL (ref 12–17)
HGB BLDA-MCNC: 6.6 G/DL (ref 12–17)
HGB UR QL STRIP.AUTO: ABNORMAL
HMPV RNA NPH QL NAA+NON-PROBE: NOT DETECTED
HOLD SPECIMEN: NORMAL
HOLD SPECIMEN: NORMAL
HPIV1 RNA ISLT QL NAA+PROBE: NOT DETECTED
HPIV2 RNA SPEC QL NAA+PROBE: NOT DETECTED
HPIV3 RNA NPH QL NAA+PROBE: NOT DETECTED
HPIV4 P GENE NPH QL NAA+PROBE: NOT DETECTED
HYALINE CASTS UR QL AUTO: ABNORMAL /LPF
INHALED O2 CONCENTRATION: 100 %
INHALED O2 CONCENTRATION: 24 %
INHALED O2 CONCENTRATION: 32 %
INHALED O2 CONCENTRATION: 60 %
INR PPP: 1.06 (ref 0.93–1.1)
INR PPP: 1.09 (ref 0.93–1.1)
IRON 24H UR-MRATE: 81 MCG/DL (ref 59–158)
ISOLATED FROM: ABNORMAL
KETONES UR QL STRIP: NEGATIVE
LARGE PLATELETS: ABNORMAL
LDH SERPL-CCNC: 240 U/L (ref 135–225)
LDH SERPL-CCNC: 260 U/L (ref 135–225)
LDH SERPL-CCNC: 275 U/L (ref 135–225)
LDLC SERPL CALC-MCNC: 57 MG/DL (ref 0–100)
LDLC/HDLC SERPL: 1.33 {RATIO}
LEUKOCYTE ESTERASE UR QL STRIP.AUTO: ABNORMAL
LEUKOCYTE ESTERASE UR QL STRIP.AUTO: NEGATIVE
LEUKOCYTE ESTERASE UR QL STRIP.AUTO: NEGATIVE
LV EF 2D ECHO EST: 60 %
LYMPHOCYTES # BLD AUTO: 0.3 10*3/MM3 (ref 0.7–3.1)
LYMPHOCYTES # BLD AUTO: 0.3 10*3/MM3 (ref 0.7–3.1)
LYMPHOCYTES # BLD AUTO: 0.4 10*3/MM3 (ref 0.7–3.1)
LYMPHOCYTES # BLD AUTO: 0.6 10*3/MM3 (ref 0.7–3.1)
LYMPHOCYTES # BLD AUTO: 0.8 10*3/MM3 (ref 0.7–3.1)
LYMPHOCYTES # BLD AUTO: 0.9 10*3/MM3 (ref 0.7–3.1)
LYMPHOCYTES # BLD MANUAL: 0.22 10*3/MM3 (ref 0.7–3.1)
LYMPHOCYTES # BLD MANUAL: 0.5 10*3/MM3 (ref 0.7–3.1)
LYMPHOCYTES # BLD MANUAL: 0.64 10*3/MM3 (ref 0.7–3.1)
LYMPHOCYTES # BLD MANUAL: 0.78 10*3/MM3 (ref 0.7–3.1)
LYMPHOCYTES # BLD MANUAL: 1.43 10*3/MM3 (ref 0.7–3.1)
LYMPHOCYTES NFR BLD AUTO: 10.5 % (ref 19.6–45.3)
LYMPHOCYTES NFR BLD AUTO: 15.6 % (ref 19.6–45.3)
LYMPHOCYTES NFR BLD AUTO: 16.9 % (ref 19.6–45.3)
LYMPHOCYTES NFR BLD AUTO: 4.2 % (ref 19.6–45.3)
LYMPHOCYTES NFR BLD AUTO: 5.3 % (ref 19.6–45.3)
LYMPHOCYTES NFR BLD AUTO: 6.8 % (ref 19.6–45.3)
LYMPHOCYTES NFR BLD AUTO: 6.8 % (ref 19.6–45.3)
LYMPHOCYTES NFR BLD AUTO: 9.2 % (ref 19.6–45.3)
LYMPHOCYTES NFR BLD AUTO: 9.2 % (ref 19.6–45.3)
LYMPHOCYTES NFR BLD MANUAL: 1 % (ref 5–12)
LYMPHOCYTES NFR BLD MANUAL: 12 % (ref 5–12)
LYMPHOCYTES NFR BLD MANUAL: 5 % (ref 5–12)
LYMPHOCYTES NFR BLD MANUAL: 8 % (ref 5–12)
M PNEUMO IGG SER IA-ACNC: NOT DETECTED
MAGNESIUM SERPL-MCNC: 1.6 MG/DL (ref 1.6–2.4)
MAGNESIUM SERPL-MCNC: 1.6 MG/DL (ref 1.6–2.4)
MAGNESIUM SERPL-MCNC: 1.7 MG/DL (ref 1.6–2.4)
MAGNESIUM SERPL-MCNC: 1.7 MG/DL (ref 1.6–2.4)
MAGNESIUM SERPL-MCNC: 1.8 MG/DL (ref 1.6–2.4)
MAGNESIUM SERPL-MCNC: 2 MG/DL (ref 1.6–2.4)
MAGNESIUM SERPL-MCNC: 2.1 MG/DL (ref 1.6–2.4)
MAGNESIUM SERPL-MCNC: 2.1 MG/DL (ref 1.6–2.4)
MAGNESIUM SERPL-MCNC: 2.2 MG/DL (ref 1.6–2.4)
MAGNESIUM SERPL-MCNC: 2.4 MG/DL (ref 1.6–2.4)
MAXIMAL PREDICTED HEART RATE: 132 BPM
MCH RBC QN AUTO: 32.8 PG (ref 26.6–33)
MCH RBC QN AUTO: 32.9 PG (ref 26.6–33)
MCH RBC QN AUTO: 33.1 PG (ref 26.6–33)
MCH RBC QN AUTO: 33.1 PG (ref 26.6–33)
MCH RBC QN AUTO: 33.4 PG (ref 26.6–33)
MCH RBC QN AUTO: 33.5 PG (ref 26.6–33)
MCH RBC QN AUTO: 33.7 PG (ref 26.6–33)
MCH RBC QN AUTO: 33.9 PG (ref 26.6–33)
MCH RBC QN AUTO: 34.1 PG (ref 26.6–33)
MCHC RBC AUTO-ENTMCNC: 32.1 G/DL (ref 31.5–35.7)
MCHC RBC AUTO-ENTMCNC: 32.2 G/DL (ref 31.5–35.7)
MCHC RBC AUTO-ENTMCNC: 32.3 G/DL (ref 31.5–35.7)
MCHC RBC AUTO-ENTMCNC: 32.4 G/DL (ref 31.5–35.7)
MCHC RBC AUTO-ENTMCNC: 33 G/DL (ref 31.5–35.7)
MCHC RBC AUTO-ENTMCNC: 33 G/DL (ref 31.5–35.7)
MCHC RBC AUTO-ENTMCNC: 33.1 G/DL (ref 31.5–35.7)
MCHC RBC AUTO-ENTMCNC: 33.4 G/DL (ref 31.5–35.7)
MCHC RBC AUTO-ENTMCNC: 33.4 G/DL (ref 31.5–35.7)
MCHC RBC AUTO-ENTMCNC: 33.6 G/DL (ref 31.5–35.7)
MCHC RBC AUTO-ENTMCNC: 33.6 G/DL (ref 31.5–35.7)
MCHC RBC AUTO-ENTMCNC: 33.9 G/DL (ref 31.5–35.7)
MCV RBC AUTO: 101.4 FL (ref 79–97)
MCV RBC AUTO: 101.4 FL (ref 79–97)
MCV RBC AUTO: 101.5 FL (ref 79–97)
MCV RBC AUTO: 101.7 FL (ref 79–97)
MCV RBC AUTO: 101.8 FL (ref 79–97)
MCV RBC AUTO: 102.5 FL (ref 79–97)
MCV RBC AUTO: 102.8 FL (ref 79–97)
MCV RBC AUTO: 102.9 FL (ref 79–97)
MCV RBC AUTO: 103.3 FL (ref 79–97)
MCV RBC AUTO: 104.1 FL (ref 79–97)
MCV RBC AUTO: 99.4 FL (ref 79–97)
MCV RBC AUTO: 99.4 FL (ref 79–97)
MCV RBC AUTO: 99.5 FL (ref 79–97)
MCV RBC AUTO: 99.8 FL (ref 79–97)
METAMYELOCYTES NFR BLD MANUAL: 2 % (ref 0–0)
METAMYELOCYTES NFR BLD MANUAL: 2 % (ref 0–0)
METAMYELOCYTES NFR BLD MANUAL: 3 % (ref 0–0)
MODALITY: ABNORMAL
MONOCYTES # BLD AUTO: 0.4 10*3/MM3 (ref 0.1–0.9)
MONOCYTES # BLD AUTO: 0.5 10*3/MM3 (ref 0.1–0.9)
MONOCYTES # BLD AUTO: 0.5 10*3/MM3 (ref 0.1–0.9)
MONOCYTES # BLD AUTO: 0.6 10*3/MM3 (ref 0.1–0.9)
MONOCYTES # BLD AUTO: 0.7 10*3/MM3 (ref 0.1–0.9)
MONOCYTES # BLD AUTO: 0.9 10*3/MM3 (ref 0.1–0.9)
MONOCYTES # BLD AUTO: 0.9 10*3/MM3 (ref 0.1–0.9)
MONOCYTES # BLD: 0.04 10*3/MM3 (ref 0.1–0.9)
MONOCYTES # BLD: 0.29 10*3/MM3 (ref 0.1–0.9)
MONOCYTES # BLD: 0.45 10*3/MM3 (ref 0.1–0.9)
MONOCYTES # BLD: 0.55 10*3/MM3 (ref 0.1–0.9)
MONOCYTES NFR BLD AUTO: 10.1 % (ref 5–12)
MONOCYTES NFR BLD AUTO: 12.6 % (ref 5–12)
MONOCYTES NFR BLD AUTO: 17.6 % (ref 5–12)
MONOCYTES NFR BLD AUTO: 5.7 % (ref 5–12)
MONOCYTES NFR BLD AUTO: 6.9 % (ref 5–12)
MONOCYTES NFR BLD AUTO: 7.1 % (ref 5–12)
MONOCYTES NFR BLD AUTO: 7.2 % (ref 5–12)
MONOCYTES NFR BLD AUTO: 8.3 % (ref 5–12)
MONOCYTES NFR BLD AUTO: 8.4 % (ref 5–12)
MRSA DNA SPEC QL NAA+PROBE: NORMAL
MYELOCYTES NFR BLD MANUAL: 1 % (ref 0–0)
NEUTROPHILS # BLD AUTO: 2.96 10*3/MM3 (ref 1.7–7)
NEUTROPHILS # BLD AUTO: 3.36 10*3/MM3 (ref 1.7–7)
NEUTROPHILS # BLD AUTO: 3.82 10*3/MM3 (ref 1.7–7)
NEUTROPHILS # BLD AUTO: 4.65 10*3/MM3 (ref 1.7–7)
NEUTROPHILS # BLD AUTO: 4.97 10*3/MM3 (ref 1.7–7)
NEUTROPHILS NFR BLD AUTO: 10.6 10*3/MM3 (ref 1.7–7)
NEUTROPHILS NFR BLD AUTO: 3.5 10*3/MM3 (ref 1.7–7)
NEUTROPHILS NFR BLD AUTO: 3.8 10*3/MM3 (ref 1.7–7)
NEUTROPHILS NFR BLD AUTO: 3.9 10*3/MM3 (ref 1.7–7)
NEUTROPHILS NFR BLD AUTO: 4.3 10*3/MM3 (ref 1.7–7)
NEUTROPHILS NFR BLD AUTO: 4.4 10*3/MM3 (ref 1.7–7)
NEUTROPHILS NFR BLD AUTO: 5.4 10*3/MM3 (ref 1.7–7)
NEUTROPHILS NFR BLD AUTO: 5.6 10*3/MM3 (ref 1.7–7)
NEUTROPHILS NFR BLD AUTO: 64.9 % (ref 42.7–76)
NEUTROPHILS NFR BLD AUTO: 71.9 % (ref 42.7–76)
NEUTROPHILS NFR BLD AUTO: 76.2 % (ref 42.7–76)
NEUTROPHILS NFR BLD AUTO: 8.2 10*3/MM3 (ref 1.7–7)
NEUTROPHILS NFR BLD AUTO: 82.5 % (ref 42.7–76)
NEUTROPHILS NFR BLD AUTO: 84.5 % (ref 42.7–76)
NEUTROPHILS NFR BLD AUTO: 84.6 % (ref 42.7–76)
NEUTROPHILS NFR BLD AUTO: 84.8 % (ref 42.7–76)
NEUTROPHILS NFR BLD AUTO: 87.2 % (ref 42.7–76)
NEUTROPHILS NFR BLD AUTO: 88.6 % (ref 42.7–76)
NEUTROPHILS NFR BLD MANUAL: 38 % (ref 42.7–76)
NEUTROPHILS NFR BLD MANUAL: 61 % (ref 42.7–76)
NEUTROPHILS NFR BLD MANUAL: 64 % (ref 42.7–76)
NEUTROPHILS NFR BLD MANUAL: 65 % (ref 42.7–76)
NEUTROPHILS NFR BLD MANUAL: 66 % (ref 42.7–76)
NEUTS BAND NFR BLD MANUAL: 11 % (ref 0–5)
NEUTS BAND NFR BLD MANUAL: 17 % (ref 0–5)
NEUTS BAND NFR BLD MANUAL: 29 % (ref 0–5)
NEUTS BAND NFR BLD MANUAL: 31 % (ref 0–5)
NEUTS BAND NFR BLD MANUAL: 9 % (ref 0–5)
NITRITE UR QL STRIP: NEGATIVE
NOROVIRUS GI+II RNA STL QL NAA+NON-PROBE: NOT DETECTED
NRBC BLD AUTO-RTO: 0 /100 WBC (ref 0–0.2)
NRBC BLD AUTO-RTO: 0.1 /100 WBC (ref 0–0.2)
NRBC SPEC MANUAL: 1 /100 WBC (ref 0–0.2)
NT-PROBNP SERPL-MCNC: 1699 PG/ML (ref 0–1800)
NT-PROBNP SERPL-MCNC: 566.3 PG/ML (ref 0–1800)
P SHIGELLOIDES DNA STL QL NAA+NON-PROBE: NOT DETECTED
PCO2 BLDA: 14 MM HG (ref 35–48)
PCO2 BLDA: 20.8 MM HG (ref 35–48)
PCO2 BLDA: 22.1 MM HG (ref 35–48)
PCO2 BLDA: 38.7 MM HG (ref 35–48)
PCO2 BLDA: 42.2 MM HG (ref 35–48)
PCO2 BLDA: 65.3 MM HG (ref 35–48)
PEEP RESPIRATORY: 5 CM[H2O]
PF4 HEPARIN CMPLX IGG SERPL IA: 0.15 OD (ref 0–0.4)
PH BLDA: 6.69 PH UNITS (ref 7.35–7.45)
PH BLDA: 7.08 PH UNITS (ref 7.35–7.45)
PH BLDA: 7.1 PH UNITS (ref 7.35–7.45)
PH BLDA: 7.43 PH UNITS (ref 7.35–7.45)
PH BLDA: 7.48 PH UNITS (ref 7.35–7.45)
PH BLDA: 7.57 PH UNITS (ref 7.35–7.45)
PH UR STRIP.AUTO: 6.5 [PH] (ref 5–8)
PH UR STRIP.AUTO: 7.5 [PH] (ref 5–8)
PH UR STRIP.AUTO: <=5 [PH] (ref 5–8)
PHENYTOIN SERPL-MCNC: 3.8 MCG/ML (ref 10–20)
PHENYTOIN SERPL-MCNC: 4.4 MCG/ML (ref 10–20)
PHOSPHATE SERPL-MCNC: 1.1 MG/DL (ref 2.5–4.5)
PHOSPHATE SERPL-MCNC: 1.1 MG/DL (ref 2.5–4.5)
PHOSPHATE SERPL-MCNC: 1.3 MG/DL (ref 2.5–4.5)
PHOSPHATE SERPL-MCNC: 1.4 MG/DL (ref 2.5–4.5)
PHOSPHATE SERPL-MCNC: 1.7 MG/DL (ref 2.5–4.5)
PHOSPHATE SERPL-MCNC: 1.9 MG/DL (ref 2.5–4.5)
PHOSPHATE SERPL-MCNC: 2.2 MG/DL (ref 2.5–4.5)
PHOSPHATE SERPL-MCNC: 2.2 MG/DL (ref 2.5–4.5)
PHOSPHATE SERPL-MCNC: 2.3 MG/DL (ref 2.5–4.5)
PHOSPHATE SERPL-MCNC: 2.5 MG/DL (ref 2.5–4.5)
PHOSPHATE SERPL-MCNC: 2.9 MG/DL (ref 2.5–4.5)
PHOSPHATE SERPL-MCNC: 3.5 MG/DL (ref 2.5–4.5)
PHOSPHATE SERPL-MCNC: 4 MG/DL (ref 2.5–4.5)
PHOSPHATE SERPL-MCNC: 6.8 MG/DL (ref 2.5–4.5)
PLAT MORPH BLD: NORMAL
PLATELET # BLD AUTO: 100 10*3/MM3 (ref 140–450)
PLATELET # BLD AUTO: 123 10*3/MM3 (ref 140–450)
PLATELET # BLD AUTO: 140 10*3/MM3 (ref 140–450)
PLATELET # BLD AUTO: 141 10*3/MM3 (ref 140–450)
PLATELET # BLD AUTO: 146 10*3/MM3 (ref 140–450)
PLATELET # BLD AUTO: 149 10*3/MM3 (ref 140–450)
PLATELET # BLD AUTO: 152 10*3/MM3 (ref 140–450)
PLATELET # BLD AUTO: 163 10*3/MM3 (ref 140–450)
PLATELET # BLD AUTO: 171 10*3/MM3 (ref 140–450)
PLATELET # BLD AUTO: 178 10*3/MM3 (ref 140–450)
PLATELET # BLD AUTO: 227 10*3/MM3 (ref 140–450)
PLATELET # BLD AUTO: 84 10*3/MM3 (ref 140–450)
PLATELET # BLD AUTO: 90 10*3/MM3 (ref 140–450)
PLATELET # BLD AUTO: 96 10*3/MM3 (ref 140–450)
PMV BLD AUTO: 7.8 FL (ref 6–12)
PMV BLD AUTO: 7.9 FL (ref 6–12)
PMV BLD AUTO: 8.1 FL (ref 6–12)
PMV BLD AUTO: 8.2 FL (ref 6–12)
PMV BLD AUTO: 8.3 FL (ref 6–12)
PMV BLD AUTO: 8.4 FL (ref 6–12)
PMV BLD AUTO: 8.5 FL (ref 6–12)
PMV BLD AUTO: 8.6 FL (ref 6–12)
PMV BLD AUTO: 8.6 FL (ref 6–12)
PMV BLD AUTO: 8.8 FL (ref 6–12)
PMV BLD AUTO: 9 FL (ref 6–12)
PMV BLD AUTO: 9.1 FL (ref 6–12)
PO2 BLDA: 131.2 MM HG (ref 83–108)
PO2 BLDA: 269.4 MM HG (ref 83–108)
PO2 BLDA: 68.7 MM HG (ref 83–108)
PO2 BLDA: 69.3 MM HG (ref 83–108)
PO2 BLDA: 71.7 MM HG (ref 83–108)
PO2 BLDA: 79.5 MM HG (ref 83–108)
POTASSIUM BLDA-SCNC: 4.6 MMOL/L (ref 3.5–4.5)
POTASSIUM BLDA-SCNC: 4.7 MMOL/L (ref 3.5–4.5)
POTASSIUM BLDA-SCNC: 6 MMOL/L (ref 3.5–4.5)
POTASSIUM SERPL-SCNC: 3.4 MMOL/L (ref 3.5–5.2)
POTASSIUM SERPL-SCNC: 3.5 MMOL/L (ref 3.5–5.2)
POTASSIUM SERPL-SCNC: 3.6 MMOL/L (ref 3.5–5.2)
POTASSIUM SERPL-SCNC: 3.8 MMOL/L (ref 3.5–5.2)
POTASSIUM SERPL-SCNC: 3.8 MMOL/L (ref 3.5–5.2)
POTASSIUM SERPL-SCNC: 3.9 MMOL/L (ref 3.5–5.2)
POTASSIUM SERPL-SCNC: 4 MMOL/L (ref 3.5–5.2)
POTASSIUM SERPL-SCNC: 4.1 MMOL/L (ref 3.5–5.2)
POTASSIUM SERPL-SCNC: 4.2 MMOL/L (ref 3.5–5.2)
POTASSIUM SERPL-SCNC: 4.4 MMOL/L (ref 3.5–5.2)
POTASSIUM SERPL-SCNC: 4.9 MMOL/L (ref 3.5–5.2)
POTASSIUM SERPL-SCNC: 5.1 MMOL/L (ref 3.5–5.2)
PROCALCITONIN SERPL-MCNC: 0.07 NG/ML (ref 0–0.25)
PROCALCITONIN SERPL-MCNC: 0.19 NG/ML (ref 0–0.25)
PROCALCITONIN SERPL-MCNC: 0.34 NG/ML (ref 0–0.25)
PROCALCITONIN SERPL-MCNC: 4.66 NG/ML (ref 0–0.25)
PROT SERPL-MCNC: 4.3 G/DL (ref 6–8.5)
PROT SERPL-MCNC: 4.7 G/DL (ref 6–8.5)
PROT SERPL-MCNC: 4.8 G/DL (ref 6–8.5)
PROT SERPL-MCNC: 4.9 G/DL (ref 6–8.5)
PROT SERPL-MCNC: 5 G/DL (ref 6–8.5)
PROT SERPL-MCNC: 5 G/DL (ref 6–8.5)
PROT SERPL-MCNC: 5.1 G/DL (ref 6–8.5)
PROT SERPL-MCNC: 5.5 G/DL (ref 6–8.5)
PROT SERPL-MCNC: 5.6 G/DL (ref 6–8.5)
PROT SERPL-MCNC: 6.7 G/DL (ref 6–8.5)
PROT UR QL STRIP: ABNORMAL
PROTHROMBIN TIME: 10.9 SECONDS (ref 9.6–11.7)
PROTHROMBIN TIME: 11.2 SECONDS (ref 9.6–11.7)
QT INTERVAL: 409 MS
QT INTERVAL: 557 MS
RBC # BLD AUTO: 2.03 10*6/MM3 (ref 4.14–5.8)
RBC # BLD AUTO: 2.75 10*6/MM3 (ref 4.14–5.8)
RBC # BLD AUTO: 3.09 10*6/MM3 (ref 4.14–5.8)
RBC # BLD AUTO: 3.12 10*6/MM3 (ref 4.14–5.8)
RBC # BLD AUTO: 3.23 10*6/MM3 (ref 4.14–5.8)
RBC # BLD AUTO: 3.36 10*6/MM3 (ref 4.14–5.8)
RBC # BLD AUTO: 3.44 10*6/MM3 (ref 4.14–5.8)
RBC # BLD AUTO: 3.56 10*6/MM3 (ref 4.14–5.8)
RBC # BLD AUTO: 3.6 10*6/MM3 (ref 4.14–5.8)
RBC # BLD AUTO: 3.81 10*6/MM3 (ref 4.14–5.8)
RBC # BLD AUTO: 4.04 10*6/MM3 (ref 4.14–5.8)
RBC # BLD AUTO: 4.05 10*6/MM3 (ref 4.14–5.8)
RBC # BLD AUTO: 4.06 10*6/MM3 (ref 4.14–5.8)
RBC # BLD AUTO: 4.08 10*6/MM3 (ref 4.14–5.8)
RBC # UR STRIP: ABNORMAL /HPF
RBC MORPH BLD: NORMAL
REF LAB TEST METHOD: ABNORMAL
RESPIRATORY RATE: 20
RESPIRATORY RATE: 30
RESPIRATORY RATE: 30
RH BLD: POSITIVE
RHINOVIRUS RNA SPEC NAA+PROBE: NOT DETECTED
RSV RNA NPH QL NAA+NON-PROBE: NOT DETECTED
RVA RNA STL QL NAA+NON-PROBE: NOT DETECTED
S ENT+BONG DNA STL QL NAA+NON-PROBE: NOT DETECTED
SAO2 % BLDCOA: 72.5 % (ref 94–98)
SAO2 % BLDCOA: 85.8 % (ref 94–98)
SAO2 % BLDCOA: 95.2 % (ref 94–98)
SAO2 % BLDCOA: 96.6 % (ref 94–98)
SAO2 % BLDCOA: 99.3 % (ref 94–98)
SAO2 % BLDCOA: 99.7 % (ref 94–98)
SAPO I+II+IV+V RNA STL QL NAA+NON-PROBE: NOT DETECTED
SARS-COV-2 RNA NPH QL NAA+NON-PROBE: DETECTED
SCAN SLIDE: NORMAL
SHIGELLA SP+EIEC IPAH ST NAA+NON-PROBE: NOT DETECTED
SMALL PLATELETS BLD QL SMEAR: ABNORMAL
SMALL PLATELETS BLD QL SMEAR: ABNORMAL
SMALL PLATELETS BLD QL SMEAR: ADEQUATE
SODIUM BLD-SCNC: 133 MMOL/L (ref 138–146)
SODIUM BLD-SCNC: 140 MMOL/L (ref 138–146)
SODIUM SERPL-SCNC: 134 MMOL/L (ref 136–145)
SODIUM SERPL-SCNC: 135 MMOL/L (ref 136–145)
SODIUM SERPL-SCNC: 136 MMOL/L (ref 136–145)
SODIUM SERPL-SCNC: 138 MMOL/L (ref 136–145)
SODIUM SERPL-SCNC: 140 MMOL/L (ref 136–145)
SODIUM SERPL-SCNC: 141 MMOL/L (ref 136–145)
SODIUM SERPL-SCNC: 141 MMOL/L (ref 136–145)
SODIUM SERPL-SCNC: 142 MMOL/L (ref 136–145)
SODIUM SERPL-SCNC: 142 MMOL/L (ref 136–145)
SODIUM SERPL-SCNC: 143 MMOL/L (ref 136–145)
SODIUM SERPL-SCNC: 145 MMOL/L (ref 136–145)
SODIUM SERPL-SCNC: 147 MMOL/L (ref 136–145)
SODIUM SERPL-SCNC: 148 MMOL/L (ref 136–145)
SODIUM SERPL-SCNC: 149 MMOL/L (ref 136–145)
SODIUM UR-SCNC: 93 MMOL/L
SP GR UR STRIP: 1.02 (ref 1–1.03)
SP GR UR STRIP: 1.03 (ref 1–1.03)
SP GR UR STRIP: <=1.005 (ref 1–1.03)
SPERM URNS QL MICRO: ABNORMAL /HPF
SQUAMOUS #/AREA URNS HPF: ABNORMAL /HPF
STRESS TARGET HR: 112 BPM
T&S EXPIRATION DATE: NORMAL
TOTAL RATE: 22 BREATHS/MINUTE
TOXIC GRANULATION: ABNORMAL
TRIGL SERPL-MCNC: 79 MG/DL (ref 0–150)
TROPONIN T SERPL-MCNC: 0.01 NG/ML (ref 0–0.03)
TSH SERPL DL<=0.05 MIU/L-ACNC: 2.83 UIU/ML (ref 0.27–4.2)
URATE SERPL-MCNC: 2.8 MG/DL (ref 3.4–7)
UROBILINOGEN UR QL STRIP: ABNORMAL
V CHOL+PARA+VUL DNA STL QL NAA+NON-PROBE: NOT DETECTED
V CHOLERAE DNA STL QL NAA+NON-PROBE: NOT DETECTED
VARIANT LYMPHS NFR BLD MANUAL: 14 % (ref 19.6–45.3)
VARIANT LYMPHS NFR BLD MANUAL: 20 % (ref 19.6–45.3)
VARIANT LYMPHS NFR BLD MANUAL: 25 % (ref 19.6–45.3)
VARIANT LYMPHS NFR BLD MANUAL: 4 % (ref 19.6–45.3)
VARIANT LYMPHS NFR BLD MANUAL: 9 % (ref 19.6–45.3)
VENTILATOR MODE: ABNORMAL
VLDLC SERPL-MCNC: 16 MG/DL (ref 5–40)
VT ON VENT VENT: 500 ML
VT ON VENT VENT: 600 ML
VT ON VENT VENT: 600 ML
WBC # UR STRIP: ABNORMAL /HPF
WBC MORPH BLD: NORMAL
WBC NRBC COR # BLD: 12.2 10*3/MM3 (ref 3.4–10.8)
WBC NRBC COR # BLD: 3.9 10*3/MM3 (ref 3.4–10.8)
WBC NRBC COR # BLD: 4.5 10*3/MM3 (ref 3.4–10.8)
WBC NRBC COR # BLD: 4.6 10*3/MM3 (ref 3.4–10.8)
WBC NRBC COR # BLD: 5.1 10*3/MM3 (ref 3.4–10.8)
WBC NRBC COR # BLD: 5.3 10*3/MM3 (ref 3.4–10.8)
WBC NRBC COR # BLD: 5.4 10*3/MM3 (ref 3.4–10.8)
WBC NRBC COR # BLD: 5.4 10*3/MM3 (ref 3.4–10.8)
WBC NRBC COR # BLD: 5.6 10*3/MM3 (ref 3.4–10.8)
WBC NRBC COR # BLD: 5.7 10*3/MM3 (ref 3.4–10.8)
WBC NRBC COR # BLD: 5.8 10*3/MM3 (ref 3.4–10.8)
WBC NRBC COR # BLD: 6.6 10*3/MM3 (ref 3.4–10.8)
WBC NRBC COR # BLD: 6.6 10*3/MM3 (ref 3.4–10.8)
WBC NRBC COR # BLD: 9.2 10*3/MM3 (ref 3.4–10.8)
WHOLE BLOOD HOLD COAG: NORMAL
Y ENTEROCOL DNA STL QL NAA+NON-PROBE: NOT DETECTED

## 2022-01-01 PROCEDURE — 80048 BASIC METABOLIC PNL TOTAL CA: CPT | Performed by: NURSE PRACTITIONER

## 2022-01-01 PROCEDURE — 63710000001 INSULIN LISPRO (HUMAN) PER 5 UNITS

## 2022-01-01 PROCEDURE — 25010000002 PIPERACILLIN SOD-TAZOBACTAM PER 1 G

## 2022-01-01 PROCEDURE — 84100 ASSAY OF PHOSPHORUS: CPT | Performed by: NURSE PRACTITIONER

## 2022-01-01 PROCEDURE — 93005 ELECTROCARDIOGRAM TRACING: CPT | Performed by: STUDENT IN AN ORGANIZED HEALTH CARE EDUCATION/TRAINING PROGRAM

## 2022-01-01 PROCEDURE — 94799 UNLISTED PULMONARY SVC/PX: CPT

## 2022-01-01 PROCEDURE — 86923 COMPATIBILITY TEST ELECTRIC: CPT

## 2022-01-01 PROCEDURE — 85730 THROMBOPLASTIN TIME PARTIAL: CPT

## 2022-01-01 PROCEDURE — 84100 ASSAY OF PHOSPHORUS: CPT

## 2022-01-01 PROCEDURE — 85025 COMPLETE CBC W/AUTO DIFF WBC: CPT

## 2022-01-01 PROCEDURE — 83735 ASSAY OF MAGNESIUM: CPT

## 2022-01-01 PROCEDURE — 83605 ASSAY OF LACTIC ACID: CPT | Performed by: NURSE PRACTITIONER

## 2022-01-01 PROCEDURE — 93308 TTE F-UP OR LMTD: CPT | Performed by: INTERNAL MEDICINE

## 2022-01-01 PROCEDURE — 85007 BL SMEAR W/DIFF WBC COUNT: CPT

## 2022-01-01 PROCEDURE — 36556 INSERT NON-TUNNEL CV CATH: CPT | Performed by: NURSE PRACTITIONER

## 2022-01-01 PROCEDURE — 74018 RADEX ABDOMEN 1 VIEW: CPT

## 2022-01-01 PROCEDURE — 0BD68ZX EXTRACTION OF RIGHT LOWER LOBE BRONCHUS, VIA NATURAL OR ARTIFICIAL OPENING ENDOSCOPIC, DIAGNOSTIC: ICD-10-PCS | Performed by: INTERNAL MEDICINE

## 2022-01-01 PROCEDURE — 80053 COMPREHEN METABOLIC PANEL: CPT

## 2022-01-01 PROCEDURE — 92526 ORAL FUNCTION THERAPY: CPT

## 2022-01-01 PROCEDURE — 82140 ASSAY OF AMMONIA: CPT | Performed by: INTERNAL MEDICINE

## 2022-01-01 PROCEDURE — 71045 X-RAY EXAM CHEST 1 VIEW: CPT

## 2022-01-01 PROCEDURE — 87040 BLOOD CULTURE FOR BACTERIA: CPT | Performed by: EMERGENCY MEDICINE

## 2022-01-01 PROCEDURE — 25010000002 CALCIUM GLUCONATE 2-0.675 GM/100ML-% SOLUTION: Performed by: INTERNAL MEDICINE

## 2022-01-01 PROCEDURE — 99233 SBSQ HOSP IP/OBS HIGH 50: CPT | Performed by: INTERNAL MEDICINE

## 2022-01-01 PROCEDURE — 25010000002 FENTANYL CITRATE (PF) 50 MCG/ML SOLUTION: Performed by: STUDENT IN AN ORGANIZED HEALTH CARE EDUCATION/TRAINING PROGRAM

## 2022-01-01 PROCEDURE — 25010000002 DEXAMETHASONE PER 1 MG: Performed by: INTERNAL MEDICINE

## 2022-01-01 PROCEDURE — 94664 DEMO&/EVAL PT USE INHALER: CPT

## 2022-01-01 PROCEDURE — 97535 SELF CARE MNGMENT TRAINING: CPT

## 2022-01-01 PROCEDURE — 25010000002 ENOXAPARIN PER 10 MG: Performed by: INTERNAL MEDICINE

## 2022-01-01 PROCEDURE — 25010000002 ALBUMIN HUMAN 5% PER 50 ML: Performed by: NURSE PRACTITIONER

## 2022-01-01 PROCEDURE — 25010000002 HEPARIN (PORCINE) PER 1000 UNITS

## 2022-01-01 PROCEDURE — C1751 CATH, INF, PER/CENT/MIDLINE: HCPCS

## 2022-01-01 PROCEDURE — 82962 GLUCOSE BLOOD TEST: CPT

## 2022-01-01 PROCEDURE — 85018 HEMOGLOBIN: CPT

## 2022-01-01 PROCEDURE — 83605 ASSAY OF LACTIC ACID: CPT

## 2022-01-01 PROCEDURE — 97530 THERAPEUTIC ACTIVITIES: CPT

## 2022-01-01 PROCEDURE — 81001 URINALYSIS AUTO W/SCOPE: CPT | Performed by: INTERNAL MEDICINE

## 2022-01-01 PROCEDURE — 25010000002 CALCIUM GLUCONATE 2-0.675 GM/100ML-% SOLUTION: Performed by: STUDENT IN AN ORGANIZED HEALTH CARE EDUCATION/TRAINING PROGRAM

## 2022-01-01 PROCEDURE — 81001 URINALYSIS AUTO W/SCOPE: CPT | Performed by: NURSE PRACTITIONER

## 2022-01-01 PROCEDURE — 0 MAGNESIUM SULFATE 4 GM/100ML SOLUTION: Performed by: INTERNAL MEDICINE

## 2022-01-01 PROCEDURE — 82803 BLOOD GASES ANY COMBINATION: CPT

## 2022-01-01 PROCEDURE — G0378 HOSPITAL OBSERVATION PER HR: HCPCS

## 2022-01-01 PROCEDURE — 99232 SBSQ HOSP IP/OBS MODERATE 35: CPT | Performed by: INTERNAL MEDICINE

## 2022-01-01 PROCEDURE — 82550 ASSAY OF CK (CPK): CPT | Performed by: INTERNAL MEDICINE

## 2022-01-01 PROCEDURE — 97116 GAIT TRAINING THERAPY: CPT

## 2022-01-01 PROCEDURE — 99222 1ST HOSP IP/OBS MODERATE 55: CPT | Performed by: INTERNAL MEDICINE

## 2022-01-01 PROCEDURE — 83540 ASSAY OF IRON: CPT | Performed by: INTERNAL MEDICINE

## 2022-01-01 PROCEDURE — 85610 PROTHROMBIN TIME: CPT

## 2022-01-01 PROCEDURE — 25010000002 FENTANYL CITRATE (PF) 50 MCG/ML SOLUTION: Performed by: NURSE PRACTITIONER

## 2022-01-01 PROCEDURE — 93005 ELECTROCARDIOGRAM TRACING: CPT

## 2022-01-01 PROCEDURE — P9016 RBC LEUKOCYTES REDUCED: HCPCS

## 2022-01-01 PROCEDURE — 94761 N-INVAS EAR/PLS OXIMETRY MLT: CPT

## 2022-01-01 PROCEDURE — 25010000002 PHENYLEPHRINE 10 MG/ML SOLUTION 5 ML VIAL: Performed by: NURSE PRACTITIONER

## 2022-01-01 PROCEDURE — 84145 PROCALCITONIN (PCT): CPT

## 2022-01-01 PROCEDURE — 83735 ASSAY OF MAGNESIUM: CPT | Performed by: NURSE PRACTITIONER

## 2022-01-01 PROCEDURE — 93970 EXTREMITY STUDY: CPT

## 2022-01-01 PROCEDURE — 82330 ASSAY OF CALCIUM: CPT

## 2022-01-01 PROCEDURE — 80051 ELECTROLYTE PANEL: CPT

## 2022-01-01 PROCEDURE — 84300 ASSAY OF URINE SODIUM: CPT | Performed by: INTERNAL MEDICINE

## 2022-01-01 PROCEDURE — 84145 PROCALCITONIN (PCT): CPT | Performed by: NURSE PRACTITIONER

## 2022-01-01 PROCEDURE — 84484 ASSAY OF TROPONIN QUANT: CPT | Performed by: NURSE PRACTITIONER

## 2022-01-01 PROCEDURE — 87150 DNA/RNA AMPLIFIED PROBE: CPT | Performed by: EMERGENCY MEDICINE

## 2022-01-01 PROCEDURE — 87040 BLOOD CULTURE FOR BACTERIA: CPT | Performed by: INTERNAL MEDICINE

## 2022-01-01 PROCEDURE — 87070 CULTURE OTHR SPECIMN AEROBIC: CPT | Performed by: INTERNAL MEDICINE

## 2022-01-01 PROCEDURE — 80053 COMPREHEN METABOLIC PANEL: CPT | Performed by: INTERNAL MEDICINE

## 2022-01-01 PROCEDURE — 87102 FUNGUS ISOLATION CULTURE: CPT | Performed by: INTERNAL MEDICINE

## 2022-01-01 PROCEDURE — 85379 FIBRIN DEGRADATION QUANT: CPT

## 2022-01-01 PROCEDURE — 87641 MR-STAPH DNA AMP PROBE: CPT

## 2022-01-01 PROCEDURE — 25010000002 MIDAZOLAM PER 1 MG: Performed by: STUDENT IN AN ORGANIZED HEALTH CARE EDUCATION/TRAINING PROGRAM

## 2022-01-01 PROCEDURE — 87147 CULTURE TYPE IMMUNOLOGIC: CPT | Performed by: EMERGENCY MEDICINE

## 2022-01-01 PROCEDURE — 97110 THERAPEUTIC EXERCISES: CPT

## 2022-01-01 PROCEDURE — 36600 WITHDRAWAL OF ARTERIAL BLOOD: CPT

## 2022-01-01 PROCEDURE — 80061 LIPID PANEL: CPT

## 2022-01-01 PROCEDURE — 0BDB8ZX EXTRACTION OF LEFT LOWER LOBE BRONCHUS, VIA NATURAL OR ARTIFICIAL OPENING ENDOSCOPIC, DIAGNOSTIC: ICD-10-PCS | Performed by: INTERNAL MEDICINE

## 2022-01-01 PROCEDURE — P9045 ALBUMIN (HUMAN), 5%, 250 ML: HCPCS | Performed by: NURSE PRACTITIONER

## 2022-01-01 PROCEDURE — 84100 ASSAY OF PHOSPHORUS: CPT | Performed by: INTERNAL MEDICINE

## 2022-01-01 PROCEDURE — 25010000002 CEFEPIME PER 500 MG: Performed by: EMERGENCY MEDICINE

## 2022-01-01 PROCEDURE — 63710000001 DEXAMETHASONE PER 0.25 MG: Performed by: INTERNAL MEDICINE

## 2022-01-01 PROCEDURE — 93005 ELECTROCARDIOGRAM TRACING: CPT | Performed by: NURSE PRACTITIONER

## 2022-01-01 PROCEDURE — 87324 CLOSTRIDIUM AG IA: CPT | Performed by: NURSE PRACTITIONER

## 2022-01-01 PROCEDURE — 25010000002 EPINEPHRINE 1 MG/ML SOLUTION 30 ML VIAL: Performed by: INTERNAL MEDICINE

## 2022-01-01 PROCEDURE — 84443 ASSAY THYROID STIM HORMONE: CPT | Performed by: INTERNAL MEDICINE

## 2022-01-01 PROCEDURE — 94002 VENT MGMT INPAT INIT DAY: CPT

## 2022-01-01 PROCEDURE — 71250 CT THORAX DX C-: CPT

## 2022-01-01 PROCEDURE — C1752 CATH,HEMODIALYSIS,SHORT-TERM: HCPCS

## 2022-01-01 PROCEDURE — 83880 ASSAY OF NATRIURETIC PEPTIDE: CPT

## 2022-01-01 PROCEDURE — 86900 BLOOD TYPING SEROLOGIC ABO: CPT

## 2022-01-01 PROCEDURE — 25010000002 CALCIUM GLUCONATE 2-0.675 GM/100ML-% SOLUTION: Performed by: NURSE PRACTITIONER

## 2022-01-01 PROCEDURE — 25010000002 ALBUMIN HUMAN 25% PER 50 ML: Performed by: INTERNAL MEDICINE

## 2022-01-01 PROCEDURE — 86900 BLOOD TYPING SEROLOGIC ABO: CPT | Performed by: STUDENT IN AN ORGANIZED HEALTH CARE EDUCATION/TRAINING PROGRAM

## 2022-01-01 PROCEDURE — 0BH18EZ INSERTION OF ENDOTRACHEAL AIRWAY INTO TRACHEA, VIA NATURAL OR ARTIFICIAL OPENING ENDOSCOPIC: ICD-10-PCS | Performed by: STUDENT IN AN ORGANIZED HEALTH CARE EDUCATION/TRAINING PROGRAM

## 2022-01-01 PROCEDURE — 0 MAGNESIUM SULFATE 4 GM/100ML SOLUTION

## 2022-01-01 PROCEDURE — 86850 RBC ANTIBODY SCREEN: CPT | Performed by: STUDENT IN AN ORGANIZED HEALTH CARE EDUCATION/TRAINING PROGRAM

## 2022-01-01 PROCEDURE — 93010 ELECTROCARDIOGRAM REPORT: CPT | Performed by: INTERNAL MEDICINE

## 2022-01-01 PROCEDURE — 86022 PLATELET ANTIBODIES: CPT | Performed by: INTERNAL MEDICINE

## 2022-01-01 PROCEDURE — 83605 ASSAY OF LACTIC ACID: CPT | Performed by: STUDENT IN AN ORGANIZED HEALTH CARE EDUCATION/TRAINING PROGRAM

## 2022-01-01 PROCEDURE — 80185 ASSAY OF PHENYTOIN TOTAL: CPT

## 2022-01-01 PROCEDURE — 87205 SMEAR GRAM STAIN: CPT | Performed by: INTERNAL MEDICINE

## 2022-01-01 PROCEDURE — 25010000002 DEXAMETHASONE PER 1 MG

## 2022-01-01 PROCEDURE — P9047 ALBUMIN (HUMAN), 25%, 50ML: HCPCS | Performed by: INTERNAL MEDICINE

## 2022-01-01 PROCEDURE — 97166 OT EVAL MOD COMPLEX 45 MIN: CPT

## 2022-01-01 PROCEDURE — 85384 FIBRINOGEN ACTIVITY: CPT

## 2022-01-01 PROCEDURE — 74230 X-RAY XM SWLNG FUNCJ C+: CPT

## 2022-01-01 PROCEDURE — 99284 EMERGENCY DEPT VISIT MOD MDM: CPT

## 2022-01-01 PROCEDURE — 70486 CT MAXILLOFACIAL W/O DYE: CPT

## 2022-01-01 PROCEDURE — 25010000002 REMDESIVIR 100 MG RECONSTITUTED SOLUTION: Performed by: EMERGENCY MEDICINE

## 2022-01-01 PROCEDURE — 5A1935Z RESPIRATORY VENTILATION, LESS THAN 24 CONSECUTIVE HOURS: ICD-10-PCS | Performed by: STUDENT IN AN ORGANIZED HEALTH CARE EDUCATION/TRAINING PROGRAM

## 2022-01-01 PROCEDURE — 0202U NFCT DS 22 TRGT SARS-COV-2: CPT | Performed by: NURSE PRACTITIONER

## 2022-01-01 PROCEDURE — 31500 INSERT EMERGENCY AIRWAY: CPT | Performed by: STUDENT IN AN ORGANIZED HEALTH CARE EDUCATION/TRAINING PROGRAM

## 2022-01-01 PROCEDURE — 93325 DOPPLER ECHO COLOR FLOW MAPG: CPT | Performed by: INTERNAL MEDICINE

## 2022-01-01 PROCEDURE — 82248 BILIRUBIN DIRECT: CPT | Performed by: EMERGENCY MEDICINE

## 2022-01-01 PROCEDURE — 02HV33Z INSERTION OF INFUSION DEVICE INTO SUPERIOR VENA CAVA, PERCUTANEOUS APPROACH: ICD-10-PCS | Performed by: NURSE PRACTITIONER

## 2022-01-01 PROCEDURE — 82330 ASSAY OF CALCIUM: CPT | Performed by: NURSE PRACTITIONER

## 2022-01-01 PROCEDURE — 5A1D90Z PERFORMANCE OF URINARY FILTRATION, CONTINUOUS, GREATER THAN 18 HOURS PER DAY: ICD-10-PCS | Performed by: INTERNAL MEDICINE

## 2022-01-01 PROCEDURE — 25010000002 PROPOFOL 10 MG/ML EMULSION: Performed by: ANESTHESIOLOGY

## 2022-01-01 PROCEDURE — 83615 LACTATE (LD) (LDH) ENZYME: CPT

## 2022-01-01 PROCEDURE — 76937 US GUIDE VASCULAR ACCESS: CPT | Performed by: NURSE PRACTITIONER

## 2022-01-01 PROCEDURE — 25010000002 HYDRALAZINE PER 20 MG: Performed by: STUDENT IN AN ORGANIZED HEALTH CARE EDUCATION/TRAINING PROGRAM

## 2022-01-01 PROCEDURE — 82565 ASSAY OF CREATININE: CPT | Performed by: EMERGENCY MEDICINE

## 2022-01-01 PROCEDURE — 87206 SMEAR FLUORESCENT/ACID STAI: CPT | Performed by: INTERNAL MEDICINE

## 2022-01-01 PROCEDURE — 87449 NOS EACH ORGANISM AG IA: CPT | Performed by: NURSE PRACTITIONER

## 2022-01-01 PROCEDURE — 97112 NEUROMUSCULAR REEDUCATION: CPT

## 2022-01-01 PROCEDURE — 92610 EVALUATE SWALLOWING FUNCTION: CPT

## 2022-01-01 PROCEDURE — 86901 BLOOD TYPING SEROLOGIC RH(D): CPT | Performed by: STUDENT IN AN ORGANIZED HEALTH CARE EDUCATION/TRAINING PROGRAM

## 2022-01-01 PROCEDURE — 97162 PT EVAL MOD COMPLEX 30 MIN: CPT

## 2022-01-01 PROCEDURE — 25010000002 MIDAZOLAM PER 1 MG: Performed by: NURSE PRACTITIONER

## 2022-01-01 PROCEDURE — 80185 ASSAY OF PHENYTOIN TOTAL: CPT | Performed by: INTERNAL MEDICINE

## 2022-01-01 PROCEDURE — 82330 ASSAY OF CALCIUM: CPT | Performed by: INTERNAL MEDICINE

## 2022-01-01 PROCEDURE — 84165 PROTEIN E-PHORESIS SERUM: CPT | Performed by: INTERNAL MEDICINE

## 2022-01-01 PROCEDURE — 82533 TOTAL CORTISOL: CPT | Performed by: INTERNAL MEDICINE

## 2022-01-01 PROCEDURE — 84132 ASSAY OF SERUM POTASSIUM: CPT

## 2022-01-01 PROCEDURE — 80076 HEPATIC FUNCTION PANEL: CPT | Performed by: EMERGENCY MEDICINE

## 2022-01-01 PROCEDURE — 85025 COMPLETE CBC W/AUTO DIFF WBC: CPT | Performed by: NURSE PRACTITIONER

## 2022-01-01 PROCEDURE — B548ZZA ULTRASONOGRAPHY OF SUPERIOR VENA CAVA, GUIDANCE: ICD-10-PCS | Performed by: NURSE PRACTITIONER

## 2022-01-01 PROCEDURE — 25010000002 PIPERACILLIN SOD-TAZOBACTAM PER 1 G: Performed by: INTERNAL MEDICINE

## 2022-01-01 PROCEDURE — C1887 CATHETER, GUIDING: HCPCS

## 2022-01-01 PROCEDURE — 93325 DOPPLER ECHO COLOR FLOW MAPG: CPT

## 2022-01-01 PROCEDURE — 87070 CULTURE OTHR SPECIMN AEROBIC: CPT

## 2022-01-01 PROCEDURE — 83735 ASSAY OF MAGNESIUM: CPT | Performed by: INTERNAL MEDICINE

## 2022-01-01 PROCEDURE — 25010000002 CALCIUM GLUCONATE PER 10 ML: Performed by: NURSE PRACTITIONER

## 2022-01-01 PROCEDURE — 25010000002 PHENYLEPHRINE 10 MG/ML SOLUTION

## 2022-01-01 PROCEDURE — 85025 COMPLETE CBC W/AUTO DIFF WBC: CPT | Performed by: STUDENT IN AN ORGANIZED HEALTH CARE EDUCATION/TRAINING PROGRAM

## 2022-01-01 PROCEDURE — 36430 TRANSFUSION BLD/BLD COMPNT: CPT

## 2022-01-01 PROCEDURE — 83615 LACTATE (LD) (LDH) ENZYME: CPT | Performed by: NURSE PRACTITIONER

## 2022-01-01 PROCEDURE — 0B9F8ZX DRAINAGE OF RIGHT LOWER LUNG LOBE, VIA NATURAL OR ARTIFICIAL OPENING ENDOSCOPIC, DIAGNOSTIC: ICD-10-PCS | Performed by: INTERNAL MEDICINE

## 2022-01-01 PROCEDURE — 87507 IADNA-DNA/RNA PROBE TQ 12-25: CPT | Performed by: NURSE PRACTITIONER

## 2022-01-01 PROCEDURE — 80053 COMPREHEN METABOLIC PANEL: CPT | Performed by: NURSE PRACTITIONER

## 2022-01-01 PROCEDURE — 0B9J8ZX DRAINAGE OF LEFT LOWER LUNG LOBE, VIA NATURAL OR ARTIFICIAL OPENING ENDOSCOPIC, DIAGNOSTIC: ICD-10-PCS | Performed by: INTERNAL MEDICINE

## 2022-01-01 PROCEDURE — XW033E5 INTRODUCTION OF REMDESIVIR ANTI-INFECTIVE INTO PERIPHERAL VEIN, PERCUTANEOUS APPROACH, NEW TECHNOLOGY GROUP 5: ICD-10-PCS | Performed by: EMERGENCY MEDICINE

## 2022-01-01 PROCEDURE — 85014 HEMATOCRIT: CPT | Performed by: INTERNAL MEDICINE

## 2022-01-01 PROCEDURE — 76775 US EXAM ABDO BACK WALL LIM: CPT

## 2022-01-01 PROCEDURE — 93308 TTE F-UP OR LMTD: CPT

## 2022-01-01 PROCEDURE — 82330 ASSAY OF CALCIUM: CPT | Performed by: STUDENT IN AN ORGANIZED HEALTH CARE EDUCATION/TRAINING PROGRAM

## 2022-01-01 PROCEDURE — 94640 AIRWAY INHALATION TREATMENT: CPT

## 2022-01-01 PROCEDURE — 87205 SMEAR GRAM STAIN: CPT

## 2022-01-01 PROCEDURE — 92611 MOTION FLUOROSCOPY/SWALLOW: CPT

## 2022-01-01 PROCEDURE — 85018 HEMOGLOBIN: CPT | Performed by: INTERNAL MEDICINE

## 2022-01-01 PROCEDURE — 84550 ASSAY OF BLOOD/URIC ACID: CPT | Performed by: INTERNAL MEDICINE

## 2022-01-01 PROCEDURE — 87340 HEPATITIS B SURFACE AG IA: CPT | Performed by: INTERNAL MEDICINE

## 2022-01-01 PROCEDURE — 85379 FIBRIN DEGRADATION QUANT: CPT | Performed by: NURSE PRACTITIONER

## 2022-01-01 PROCEDURE — 83735 ASSAY OF MAGNESIUM: CPT | Performed by: STUDENT IN AN ORGANIZED HEALTH CARE EDUCATION/TRAINING PROGRAM

## 2022-01-01 PROCEDURE — 87116 MYCOBACTERIA CULTURE: CPT | Performed by: INTERNAL MEDICINE

## 2022-01-01 PROCEDURE — 88312 SPECIAL STAINS GROUP 1: CPT | Performed by: EMERGENCY MEDICINE

## 2022-01-01 PROCEDURE — 31500 INSERT EMERGENCY AIRWAY: CPT

## 2022-01-01 RX ORDER — SODIUM CHLORIDE 9 MG/ML
INJECTION, SOLUTION INTRAVENOUS
Status: COMPLETED
Start: 2022-01-01 | End: 2022-01-01

## 2022-01-01 RX ORDER — ALBUMIN, HUMAN INJ 5% 5 %
500 SOLUTION INTRAVENOUS ONCE
Status: COMPLETED | OUTPATIENT
Start: 2022-01-01 | End: 2022-01-01

## 2022-01-01 RX ORDER — CALCIUM CHLORIDE, MAGNESIUM CHLORIDE, SODIUM CHLORIDE, SODIUM BICARBONATE, POTASSIUM CHLORIDE AND SODIUM PHOSPHATE DIBASIC DIHYDRATE 3.68; 3.05; 6.34; 3.09; .314; .187 G/L; G/L; G/L; G/L; G/L; G/L
2000 INJECTION INTRAVENOUS CONTINUOUS
Status: DISCONTINUED | OUTPATIENT
Start: 2022-01-01 | End: 2022-01-01 | Stop reason: HOSPADM

## 2022-01-01 RX ORDER — CALCIUM GLUCONATE 20 MG/ML
2 INJECTION, SOLUTION INTRAVENOUS ONCE
Status: COMPLETED | OUTPATIENT
Start: 2022-01-01 | End: 2022-01-01

## 2022-01-01 RX ORDER — ACETAMINOPHEN 325 MG/1
650 TABLET ORAL EVERY 4 HOURS PRN
Status: DISCONTINUED | OUTPATIENT
Start: 2022-01-01 | End: 2022-01-01 | Stop reason: SDUPTHER

## 2022-01-01 RX ORDER — ASPIRIN 325 MG
325 TABLET ORAL DAILY
Status: DISCONTINUED | OUTPATIENT
Start: 2022-01-01 | End: 2022-01-01

## 2022-01-01 RX ORDER — FENTANYL CITRATE 50 UG/ML
25 INJECTION, SOLUTION INTRAMUSCULAR; INTRAVENOUS
Status: DISCONTINUED | OUTPATIENT
Start: 2022-01-01 | End: 2022-01-01 | Stop reason: HOSPADM

## 2022-01-01 RX ORDER — HYDRALAZINE HYDROCHLORIDE 20 MG/ML
10 INJECTION INTRAMUSCULAR; INTRAVENOUS EVERY 6 HOURS PRN
Status: DISCONTINUED | OUTPATIENT
Start: 2022-01-01 | End: 2022-01-01 | Stop reason: HOSPADM

## 2022-01-01 RX ORDER — POTASSIUM CHLORIDE 29.8 MG/ML
20 INJECTION INTRAVENOUS AS NEEDED
Status: DISCONTINUED | OUTPATIENT
Start: 2022-01-01 | End: 2022-01-01 | Stop reason: HOSPADM

## 2022-01-01 RX ORDER — PHENYTOIN 125 MG/5ML
150 SUSPENSION ORAL 2 TIMES DAILY
Status: DISCONTINUED | OUTPATIENT
Start: 2022-01-01 | End: 2022-01-01 | Stop reason: HOSPADM

## 2022-01-01 RX ORDER — FENTANYL CITRATE 50 UG/ML
100 INJECTION, SOLUTION INTRAMUSCULAR; INTRAVENOUS ONCE
Status: COMPLETED | OUTPATIENT
Start: 2022-01-01 | End: 2022-01-01

## 2022-01-01 RX ORDER — DEXAMETHASONE SODIUM PHOSPHATE 4 MG/ML
6 INJECTION, SOLUTION INTRA-ARTICULAR; INTRALESIONAL; INTRAMUSCULAR; INTRAVENOUS; SOFT TISSUE DAILY
Status: DISCONTINUED | OUTPATIENT
Start: 2022-01-01 | End: 2022-01-01

## 2022-01-01 RX ORDER — ALBUMIN (HUMAN) 12.5 G/50ML
25 SOLUTION INTRAVENOUS EVERY 8 HOURS
Status: DISCONTINUED | OUTPATIENT
Start: 2022-01-01 | End: 2022-01-01 | Stop reason: HOSPADM

## 2022-01-01 RX ORDER — ATORVASTATIN CALCIUM 20 MG/1
20 TABLET, FILM COATED ORAL DAILY
Status: DISCONTINUED | OUTPATIENT
Start: 2022-01-01 | End: 2022-01-01

## 2022-01-01 RX ORDER — NICOTINE POLACRILEX 4 MG
15 LOZENGE BUCCAL
Status: DISCONTINUED | OUTPATIENT
Start: 2022-01-01 | End: 2022-01-01 | Stop reason: HOSPADM

## 2022-01-01 RX ORDER — ATORVASTATIN CALCIUM 20 MG/1
20 TABLET, FILM COATED ORAL DAILY
COMMUNITY

## 2022-01-01 RX ORDER — SODIUM CHLORIDE 0.9 % (FLUSH) 0.9 %
10 SYRINGE (ML) INJECTION AS NEEDED
Status: DISCONTINUED | OUTPATIENT
Start: 2022-01-01 | End: 2022-01-01 | Stop reason: HOSPADM

## 2022-01-01 RX ORDER — AMLODIPINE BESYLATE 5 MG/1
2.5 TABLET ORAL DAILY
COMMUNITY

## 2022-01-01 RX ORDER — ACETAMINOPHEN 650 MG/1
650 SUPPOSITORY RECTAL EVERY 4 HOURS PRN
Status: DISCONTINUED | OUTPATIENT
Start: 2022-01-01 | End: 2022-01-01 | Stop reason: HOSPADM

## 2022-01-01 RX ORDER — ALUMINA, MAGNESIA, AND SIMETHICONE 2400; 2400; 240 MG/30ML; MG/30ML; MG/30ML
15 SUSPENSION ORAL EVERY 6 HOURS PRN
Status: DISCONTINUED | OUTPATIENT
Start: 2022-01-01 | End: 2022-01-01

## 2022-01-01 RX ORDER — PHENYTOIN SODIUM 100 MG/1
300 CAPSULE, EXTENDED RELEASE ORAL NIGHTLY
Status: DISCONTINUED | OUTPATIENT
Start: 2022-01-01 | End: 2022-01-01

## 2022-01-01 RX ORDER — IPRATROPIUM BROMIDE AND ALBUTEROL SULFATE 2.5; .5 MG/3ML; MG/3ML
3 SOLUTION RESPIRATORY (INHALATION)
Status: DISCONTINUED | OUTPATIENT
Start: 2022-01-01 | End: 2022-01-01 | Stop reason: HOSPADM

## 2022-01-01 RX ORDER — LEVETIRACETAM 500 MG/1
500 TABLET ORAL 2 TIMES DAILY
Status: ON HOLD | COMMUNITY
End: 2022-01-01

## 2022-01-01 RX ORDER — SODIUM CHLORIDE 9 MG/ML
100 INJECTION, SOLUTION INTRAVENOUS CONTINUOUS
Status: DISCONTINUED | OUTPATIENT
Start: 2022-01-01 | End: 2022-01-01

## 2022-01-01 RX ORDER — POLYETHYLENE GLYCOL 3350 17 G/17G
17 POWDER, FOR SOLUTION ORAL DAILY PRN
Status: DISCONTINUED | OUTPATIENT
Start: 2022-01-01 | End: 2022-01-01 | Stop reason: HOSPADM

## 2022-01-01 RX ORDER — AMLODIPINE BESYLATE 2.5 MG/1
2.5 TABLET ORAL
Status: DISCONTINUED | OUTPATIENT
Start: 2022-01-01 | End: 2022-01-01

## 2022-01-01 RX ORDER — PROPOFOL 10 MG/ML
VIAL (ML) INTRAVENOUS AS NEEDED
Status: DISCONTINUED | OUTPATIENT
Start: 2022-01-01 | End: 2022-01-01 | Stop reason: SURG

## 2022-01-01 RX ORDER — MAGNESIUM SULFATE HEPTAHYDRATE 40 MG/ML
2 INJECTION, SOLUTION INTRAVENOUS AS NEEDED
Status: DISCONTINUED | OUTPATIENT
Start: 2022-01-01 | End: 2022-01-01 | Stop reason: HOSPADM

## 2022-01-01 RX ORDER — POTASSIUM CHLORIDE 20 MEQ/1
40 TABLET, EXTENDED RELEASE ORAL AS NEEDED
Status: DISCONTINUED | OUTPATIENT
Start: 2022-01-01 | End: 2022-01-01 | Stop reason: HOSPADM

## 2022-01-01 RX ORDER — ONDANSETRON 4 MG/1
4 TABLET, FILM COATED ORAL EVERY 6 HOURS PRN
Status: DISCONTINUED | OUTPATIENT
Start: 2022-01-01 | End: 2022-01-01 | Stop reason: HOSPADM

## 2022-01-01 RX ORDER — POLYETHYLENE GLYCOL 3350 17 G/17G
17 POWDER, FOR SOLUTION ORAL DAILY PRN
Status: DISCONTINUED | OUTPATIENT
Start: 2022-01-01 | End: 2022-01-01

## 2022-01-01 RX ORDER — LEVETIRACETAM 500 MG/1
500 TABLET ORAL 2 TIMES DAILY
Status: DISCONTINUED | OUTPATIENT
Start: 2022-01-01 | End: 2022-01-01

## 2022-01-01 RX ORDER — ACETAMINOPHEN 325 MG/1
650 TABLET ORAL EVERY 4 HOURS PRN
Status: DISCONTINUED | OUTPATIENT
Start: 2022-01-01 | End: 2022-01-01 | Stop reason: HOSPADM

## 2022-01-01 RX ORDER — HEPARIN SODIUM 5000 [USP'U]/ML
5000 INJECTION, SOLUTION INTRAVENOUS; SUBCUTANEOUS EVERY 12 HOURS SCHEDULED
Status: DISCONTINUED | OUTPATIENT
Start: 2022-01-01 | End: 2022-01-01

## 2022-01-01 RX ORDER — NOREPINEPHRINE BIT/0.9 % NACL 8 MG/250ML
INFUSION BOTTLE (ML) INTRAVENOUS
Status: COMPLETED
Start: 2022-01-01 | End: 2022-01-01

## 2022-01-01 RX ORDER — BISACODYL 5 MG/1
5 TABLET, DELAYED RELEASE ORAL DAILY PRN
Status: DISCONTINUED | OUTPATIENT
Start: 2022-01-01 | End: 2022-01-01

## 2022-01-01 RX ORDER — ASPIRIN 325 MG
325 TABLET ORAL DAILY
COMMUNITY

## 2022-01-01 RX ORDER — SODIUM CHLORIDE 9 MG/ML
50 INJECTION, SOLUTION INTRAVENOUS CONTINUOUS
Status: DISCONTINUED | OUTPATIENT
Start: 2022-01-01 | End: 2022-01-01

## 2022-01-01 RX ORDER — FENTANYL/ROPIVACAINE/NS/PF 2-625MCG/1
15 PLASTIC BAG, INJECTION (ML) EPIDURAL ONCE
Status: COMPLETED | OUTPATIENT
Start: 2022-01-01 | End: 2022-01-01

## 2022-01-01 RX ORDER — INSULIN LISPRO 100 [IU]/ML
0-7 INJECTION, SOLUTION INTRAVENOUS; SUBCUTANEOUS EVERY 6 HOURS SCHEDULED
Status: DISCONTINUED | OUTPATIENT
Start: 2022-01-01 | End: 2022-01-01 | Stop reason: HOSPADM

## 2022-01-01 RX ORDER — MIDAZOLAM HYDROCHLORIDE 1 MG/ML
2 INJECTION INTRAMUSCULAR; INTRAVENOUS
Status: DISCONTINUED | OUTPATIENT
Start: 2022-01-01 | End: 2022-01-01 | Stop reason: HOSPADM

## 2022-01-01 RX ORDER — ONDANSETRON 2 MG/ML
4 INJECTION INTRAMUSCULAR; INTRAVENOUS EVERY 6 HOURS PRN
Status: DISCONTINUED | OUTPATIENT
Start: 2022-01-01 | End: 2022-01-01 | Stop reason: HOSPADM

## 2022-01-01 RX ORDER — OLANZAPINE 10 MG/2ML
1 INJECTION, POWDER, LYOPHILIZED, FOR SOLUTION INTRAMUSCULAR
Status: DISCONTINUED | OUTPATIENT
Start: 2022-01-01 | End: 2022-01-01 | Stop reason: HOSPADM

## 2022-01-01 RX ORDER — ASPIRIN 81 MG/1
81 TABLET, CHEWABLE ORAL DAILY
Status: DISCONTINUED | OUTPATIENT
Start: 2022-01-01 | End: 2022-01-01 | Stop reason: HOSPADM

## 2022-01-01 RX ORDER — MAGNESIUM SULFATE HEPTAHYDRATE 40 MG/ML
4 INJECTION, SOLUTION INTRAVENOUS AS NEEDED
Status: DISCONTINUED | OUTPATIENT
Start: 2022-01-01 | End: 2022-01-01 | Stop reason: HOSPADM

## 2022-01-01 RX ORDER — DEXAMETHASONE 4 MG/1
6 TABLET ORAL DAILY
Status: DISCONTINUED | OUTPATIENT
Start: 2022-01-01 | End: 2022-01-01

## 2022-01-01 RX ORDER — PANTOPRAZOLE SODIUM 40 MG/10ML
40 INJECTION, POWDER, LYOPHILIZED, FOR SOLUTION INTRAVENOUS
Status: DISCONTINUED | OUTPATIENT
Start: 2022-01-01 | End: 2022-01-01

## 2022-01-01 RX ORDER — MIDAZOLAM HYDROCHLORIDE 1 MG/ML
4 INJECTION INTRAMUSCULAR; INTRAVENOUS ONCE
Status: COMPLETED | OUTPATIENT
Start: 2022-01-01 | End: 2022-01-01

## 2022-01-01 RX ORDER — BISACODYL 10 MG
10 SUPPOSITORY, RECTAL RECTAL DAILY PRN
Status: DISCONTINUED | OUTPATIENT
Start: 2022-01-01 | End: 2022-01-01 | Stop reason: HOSPADM

## 2022-01-01 RX ORDER — LANOLIN ALCOHOL/MO/W.PET/CERES
1000 CREAM (GRAM) TOPICAL DAILY
Status: DISCONTINUED | OUTPATIENT
Start: 2022-01-01 | End: 2022-01-01

## 2022-01-01 RX ORDER — HEPARIN SODIUM 1000 [USP'U]/ML
INJECTION, SOLUTION INTRAVENOUS; SUBCUTANEOUS AS NEEDED
Status: DISCONTINUED | OUTPATIENT
Start: 2022-01-01 | End: 2022-01-01 | Stop reason: HOSPADM

## 2022-01-01 RX ORDER — BISACODYL 10 MG
10 SUPPOSITORY, RECTAL RECTAL DAILY PRN
Status: DISCONTINUED | OUTPATIENT
Start: 2022-01-01 | End: 2022-01-01

## 2022-01-01 RX ORDER — LISINOPRIL 20 MG/1
40 TABLET ORAL DAILY
Status: DISCONTINUED | OUTPATIENT
Start: 2022-01-01 | End: 2022-01-01

## 2022-01-01 RX ORDER — DEXTROSE MONOHYDRATE 25 G/50ML
25 INJECTION, SOLUTION INTRAVENOUS
Status: DISCONTINUED | OUTPATIENT
Start: 2022-01-01 | End: 2022-01-01 | Stop reason: HOSPADM

## 2022-01-01 RX ORDER — NOREPINEPHRINE BIT/0.9 % NACL 8 MG/250ML
.02-.5 INFUSION BOTTLE (ML) INTRAVENOUS
Status: DISCONTINUED | OUTPATIENT
Start: 2022-01-01 | End: 2022-01-01 | Stop reason: HOSPADM

## 2022-01-01 RX ORDER — ASPIRIN 81 MG/1
81 TABLET ORAL DAILY
Status: DISCONTINUED | OUTPATIENT
Start: 2022-01-01 | End: 2022-01-01

## 2022-01-01 RX ORDER — FENTANYL CITRATE-0.9 % NACL/PF 10 MCG/ML
50-300 PLASTIC BAG, INJECTION (ML) INTRAVENOUS
Status: DISCONTINUED | OUTPATIENT
Start: 2022-01-01 | End: 2022-01-01 | Stop reason: HOSPADM

## 2022-01-01 RX ORDER — SODIUM CHLORIDE 0.9 % (FLUSH) 0.9 %
10 SYRINGE (ML) INJECTION EVERY 12 HOURS SCHEDULED
Status: DISCONTINUED | OUTPATIENT
Start: 2022-01-01 | End: 2022-01-01 | Stop reason: HOSPADM

## 2022-01-01 RX ORDER — PHENYTOIN SODIUM 100 MG/1
300 CAPSULE, EXTENDED RELEASE ORAL NIGHTLY
COMMUNITY

## 2022-01-01 RX ORDER — NITROGLYCERIN 0.4 MG/1
0.4 TABLET SUBLINGUAL
Status: DISCONTINUED | OUTPATIENT
Start: 2022-01-01 | End: 2022-01-01 | Stop reason: HOSPADM

## 2022-01-01 RX ORDER — ALBUMIN (HUMAN) 12.5 G/50ML
SOLUTION INTRAVENOUS
Status: DISCONTINUED
Start: 2022-01-01 | End: 2022-01-01 | Stop reason: HOSPADM

## 2022-01-01 RX ORDER — FAMOTIDINE 20 MG/1
20 TABLET, FILM COATED ORAL
Status: DISCONTINUED | OUTPATIENT
Start: 2022-01-01 | End: 2022-01-01

## 2022-01-01 RX ORDER — CHOLECALCIFEROL (VITAMIN D3) 125 MCG
5 CAPSULE ORAL NIGHTLY PRN
Status: DISCONTINUED | OUTPATIENT
Start: 2022-01-01 | End: 2022-01-01 | Stop reason: HOSPADM

## 2022-01-01 RX ORDER — PANTOPRAZOLE SODIUM 40 MG/1
40 TABLET, DELAYED RELEASE ORAL
Status: DISCONTINUED | OUTPATIENT
Start: 2022-01-01 | End: 2022-01-01

## 2022-01-01 RX ORDER — POTASSIUM CHLORIDE 1.5 G/1.77G
40 POWDER, FOR SOLUTION ORAL AS NEEDED
Status: DISCONTINUED | OUTPATIENT
Start: 2022-01-01 | End: 2022-01-01 | Stop reason: HOSPADM

## 2022-01-01 RX ORDER — BISACODYL 5 MG/1
5 TABLET, DELAYED RELEASE ORAL DAILY PRN
Status: DISCONTINUED | OUTPATIENT
Start: 2022-01-01 | End: 2022-01-01 | Stop reason: HOSPADM

## 2022-01-01 RX ORDER — IPRATROPIUM BROMIDE AND ALBUTEROL SULFATE 2.5; .5 MG/3ML; MG/3ML
3 SOLUTION RESPIRATORY (INHALATION)
Status: DISCONTINUED | OUTPATIENT
Start: 2022-01-01 | End: 2022-01-01

## 2022-01-01 RX ORDER — FOLIC ACID/MULTIVIT,IRON,MINER .4-18-35
1 TABLET,CHEWABLE ORAL DAILY
Status: DISCONTINUED | OUTPATIENT
Start: 2022-01-01 | End: 2022-01-01 | Stop reason: HOSPADM

## 2022-01-01 RX ORDER — ACETAMINOPHEN 500 MG
1000 TABLET ORAL ONCE
Status: COMPLETED | OUTPATIENT
Start: 2022-01-01 | End: 2022-01-01

## 2022-01-01 RX ORDER — CALCIUM GLUCONATE 20 MG/ML
1 INJECTION, SOLUTION INTRAVENOUS ONCE AS NEEDED
Status: DISCONTINUED | OUTPATIENT
Start: 2022-01-01 | End: 2022-01-01 | Stop reason: HOSPADM

## 2022-01-01 RX ORDER — LIDOCAINE HYDROCHLORIDE 20 MG/ML
INJECTION, SOLUTION INFILTRATION; PERINEURAL AS NEEDED
Status: DISCONTINUED | OUTPATIENT
Start: 2022-01-01 | End: 2022-01-01 | Stop reason: HOSPADM

## 2022-01-01 RX ORDER — DEXAMETHASONE 4 MG/1
4 TABLET ORAL DAILY
Status: DISCONTINUED | OUTPATIENT
Start: 2022-01-01 | End: 2022-01-01 | Stop reason: HOSPADM

## 2022-01-01 RX ORDER — LIDOCAINE 50 MG/G
OINTMENT TOPICAL AS NEEDED
Status: DISCONTINUED | OUTPATIENT
Start: 2022-01-01 | End: 2022-01-01 | Stop reason: HOSPADM

## 2022-01-01 RX ORDER — SODIUM CHLORIDE 0.9 % (FLUSH) 0.9 %
10 SYRINGE (ML) INJECTION EVERY 12 HOURS SCHEDULED
Status: DISCONTINUED | OUTPATIENT
Start: 2022-01-01 | End: 2022-01-01

## 2022-01-01 RX ORDER — PHENYLEPHRINE HYDROCHLORIDE 10 MG/ML
INJECTION INTRAVENOUS
Status: COMPLETED
Start: 2022-01-01 | End: 2022-01-01

## 2022-01-01 RX ORDER — LANSOPRAZOLE
30 KIT EVERY MORNING
Status: DISCONTINUED | OUTPATIENT
Start: 2022-01-01 | End: 2022-01-01

## 2022-01-01 RX ORDER — CALCIUM GLUCONATE 20 MG/ML
2 INJECTION, SOLUTION INTRAVENOUS ONCE AS NEEDED
Status: DISCONTINUED | OUTPATIENT
Start: 2022-01-01 | End: 2022-01-01 | Stop reason: HOSPADM

## 2022-01-01 RX ORDER — ALBUTEROL SULFATE 2.5 MG/3ML
2.5 SOLUTION RESPIRATORY (INHALATION)
Status: DISCONTINUED | OUTPATIENT
Start: 2022-01-01 | End: 2022-01-01

## 2022-01-01 RX ORDER — ALBUMIN (HUMAN) 12.5 G/50ML
12.5 SOLUTION INTRAVENOUS ONCE
Status: DISCONTINUED | OUTPATIENT
Start: 2022-01-01 | End: 2022-01-01

## 2022-01-01 RX ORDER — CHLORHEXIDINE GLUCONATE 0.12 MG/ML
15 RINSE ORAL EVERY 12 HOURS SCHEDULED
Status: DISCONTINUED | OUTPATIENT
Start: 2022-01-01 | End: 2022-01-01 | Stop reason: HOSPADM

## 2022-01-01 RX ORDER — SODIUM CHLORIDE 450 MG/100ML
50 INJECTION, SOLUTION INTRAVENOUS CONTINUOUS
Status: DISCONTINUED | OUTPATIENT
Start: 2022-01-01 | End: 2022-01-01

## 2022-01-01 RX ORDER — TRAMADOL HYDROCHLORIDE 50 MG/1
50 TABLET ORAL EVERY 4 HOURS PRN
Status: DISCONTINUED | OUTPATIENT
Start: 2022-01-01 | End: 2022-01-01 | Stop reason: HOSPADM

## 2022-01-01 RX ORDER — GLYCOPYRROLATE 0.2 MG/ML
0.3 INJECTION INTRAMUSCULAR; INTRAVENOUS EVERY 4 HOURS PRN
Status: DISCONTINUED | OUTPATIENT
Start: 2022-01-01 | End: 2022-01-01 | Stop reason: HOSPADM

## 2022-01-01 RX ORDER — ENOXAPARIN SODIUM 100 MG/ML
1 INJECTION SUBCUTANEOUS EVERY 12 HOURS
Status: DISCONTINUED | OUTPATIENT
Start: 2022-01-01 | End: 2022-01-01

## 2022-01-01 RX ORDER — SODIUM CHLORIDE 9 MG/ML
INJECTION, SOLUTION INTRAVENOUS CONTINUOUS PRN
Status: DISCONTINUED | OUTPATIENT
Start: 2022-01-01 | End: 2022-01-01 | Stop reason: SURG

## 2022-01-01 RX ORDER — ONDANSETRON 2 MG/ML
4 INJECTION INTRAMUSCULAR; INTRAVENOUS EVERY 6 HOURS PRN
Status: DISCONTINUED | OUTPATIENT
Start: 2022-01-01 | End: 2022-01-01 | Stop reason: SDUPTHER

## 2022-01-01 RX ORDER — ATORVASTATIN CALCIUM 40 MG/1
40 TABLET, FILM COATED ORAL DAILY
Status: DISCONTINUED | OUTPATIENT
Start: 2022-01-01 | End: 2022-01-01

## 2022-01-01 RX ORDER — ALBUTEROL SULFATE 90 UG/1
2 AEROSOL, METERED RESPIRATORY (INHALATION)
Status: DISCONTINUED | OUTPATIENT
Start: 2022-01-01 | End: 2022-01-01

## 2022-01-01 RX ORDER — BUDESONIDE 0.5 MG/2ML
1 INHALANT ORAL
Status: DISCONTINUED | OUTPATIENT
Start: 2022-01-01 | End: 2022-01-01

## 2022-01-01 RX ORDER — CHOLECALCIFEROL (VITAMIN D3) 125 MCG
5 CAPSULE ORAL NIGHTLY PRN
Status: DISCONTINUED | OUTPATIENT
Start: 2022-01-01 | End: 2022-01-01

## 2022-01-01 RX ORDER — MELATONIN
1000 DAILY
Status: DISCONTINUED | OUTPATIENT
Start: 2022-01-01 | End: 2022-01-01

## 2022-01-01 RX ORDER — PANTOPRAZOLE SODIUM 40 MG/10ML
40 INJECTION, POWDER, LYOPHILIZED, FOR SOLUTION INTRAVENOUS
Status: DISCONTINUED | OUTPATIENT
Start: 2022-08-31 | End: 2022-01-01 | Stop reason: HOSPADM

## 2022-01-01 RX ORDER — BUMETANIDE 0.25 MG/ML
2 INJECTION INTRAMUSCULAR; INTRAVENOUS ONCE
Status: COMPLETED | OUTPATIENT
Start: 2022-01-01 | End: 2022-01-01

## 2022-01-01 RX ADMIN — ATORVASTATIN CALCIUM 40 MG: 40 TABLET, FILM COATED ORAL at 09:10

## 2022-01-01 RX ADMIN — SODIUM BICARBONATE 50 MEQ: 84 INJECTION, SOLUTION INTRAVENOUS at 10:20

## 2022-01-01 RX ADMIN — Medication 2 PACKET: at 14:56

## 2022-01-01 RX ADMIN — GUAIFENESIN 200 MG: 200 SOLUTION ORAL at 01:43

## 2022-01-01 RX ADMIN — GUAIFENESIN 200 MG: 200 SOLUTION ORAL at 10:20

## 2022-01-01 RX ADMIN — Medication 10 ML: at 08:30

## 2022-01-01 RX ADMIN — GUAIFENESIN 200 MG: 200 SOLUTION ORAL at 16:20

## 2022-01-01 RX ADMIN — LANSOPRAZOLE 30 MG: KIT at 06:20

## 2022-01-01 RX ADMIN — GUAIFENESIN 200 MG: 200 SOLUTION ORAL at 21:33

## 2022-01-01 RX ADMIN — ACETAMINOPHEN 650 MG: 325 TABLET, FILM COATED ORAL at 20:41

## 2022-01-01 RX ADMIN — Medication 2 PACKET: at 12:48

## 2022-01-01 RX ADMIN — DILTIAZEM HYDROCHLORIDE 30 MG: 30 TABLET, FILM COATED ORAL at 15:23

## 2022-01-01 RX ADMIN — PHENYTOIN 150 MG: 125 SUSPENSION ORAL at 08:39

## 2022-01-01 RX ADMIN — GUAIFENESIN 200 MG: 200 SOLUTION ORAL at 16:30

## 2022-01-01 RX ADMIN — PHENYTOIN 150 MG: 125 SUSPENSION ORAL at 10:09

## 2022-01-01 RX ADMIN — INSULIN LISPRO 2 UNITS: 100 INJECTION, SOLUTION INTRAVENOUS; SUBCUTANEOUS at 18:18

## 2022-01-01 RX ADMIN — Medication 5 MG: at 20:41

## 2022-01-01 RX ADMIN — Medication 10 ML: at 09:11

## 2022-01-01 RX ADMIN — INSULIN LISPRO 2 UNITS: 100 INJECTION, SOLUTION INTRAVENOUS; SUBCUTANEOUS at 17:32

## 2022-01-01 RX ADMIN — REMDESIVIR 200 MG: 100 INJECTION, POWDER, LYOPHILIZED, FOR SOLUTION INTRAVENOUS at 22:21

## 2022-01-01 RX ADMIN — Medication 10 ML: at 08:40

## 2022-01-01 RX ADMIN — FAMOTIDINE 20 MG: 20 TABLET, FILM COATED ORAL at 09:55

## 2022-01-01 RX ADMIN — PIPERACILLIN AND TAZOBACTAM 3.38 G: 3; .375 INJECTION, POWDER, FOR SOLUTION INTRAVENOUS at 02:14

## 2022-01-01 RX ADMIN — MAGNESIUM SULFATE HEPTAHYDRATE 4 G: 40 INJECTION, SOLUTION INTRAVENOUS at 08:28

## 2022-01-01 RX ADMIN — EPINEPHRINE 0.1 MCG/KG/MIN: 1 INJECTION INTRAMUSCULAR; INTRAVENOUS; SUBCUTANEOUS at 13:42

## 2022-01-01 RX ADMIN — PHENYTOIN 150 MG: 125 SUSPENSION ORAL at 08:40

## 2022-01-01 RX ADMIN — GUAIFENESIN 200 MG: 200 SOLUTION ORAL at 04:58

## 2022-01-01 RX ADMIN — ASPIRIN 325 MG ORAL TABLET 325 MG: 325 PILL ORAL at 10:33

## 2022-01-01 RX ADMIN — AMLODIPINE BESYLATE 2.5 MG: 2.5 TABLET ORAL at 08:39

## 2022-01-01 RX ADMIN — SODIUM CHLORIDE 50 ML/HR: 4.5 INJECTION, SOLUTION INTRAVENOUS at 02:51

## 2022-01-01 RX ADMIN — PIPERACILLIN AND TAZOBACTAM 3.38 G: 3; .375 INJECTION, POWDER, FOR SOLUTION INTRAVENOUS at 13:26

## 2022-01-01 RX ADMIN — PHENYTOIN 150 MG: 125 SUSPENSION ORAL at 21:04

## 2022-01-01 RX ADMIN — Medication 10 ML: at 21:03

## 2022-01-01 RX ADMIN — Medication 0.2 MCG/KG/MIN: at 06:27

## 2022-01-01 RX ADMIN — IPRATROPIUM BROMIDE AND ALBUTEROL 1 PUFF: 20; 100 SPRAY, METERED RESPIRATORY (INHALATION) at 15:54

## 2022-01-01 RX ADMIN — SODIUM CHLORIDE 125 ML/HR: 9 INJECTION, SOLUTION INTRAVENOUS at 10:10

## 2022-01-01 RX ADMIN — POTASSIUM CHLORIDE 40 MEQ: 1.5 POWDER, FOR SOLUTION ORAL at 16:20

## 2022-01-01 RX ADMIN — LANSOPRAZOLE 30 MG: KIT at 05:14

## 2022-01-01 RX ADMIN — ACETAMINOPHEN 650 MG: 325 TABLET, FILM COATED ORAL at 00:32

## 2022-01-01 RX ADMIN — PIPERACILLIN AND TAZOBACTAM 3.38 G: 3; .375 INJECTION, POWDER, FOR SOLUTION INTRAVENOUS at 21:10

## 2022-01-01 RX ADMIN — ACETAMINOPHEN 650 MG: 325 TABLET, FILM COATED ORAL at 10:08

## 2022-01-01 RX ADMIN — PHENYTOIN 150 MG: 125 SUSPENSION ORAL at 20:57

## 2022-01-01 RX ADMIN — Medication 2 PACKET: at 13:25

## 2022-01-01 RX ADMIN — ACETAMINOPHEN 650 MG: 325 TABLET, FILM COATED ORAL at 01:06

## 2022-01-01 RX ADMIN — ATORVASTATIN CALCIUM 20 MG: 20 TABLET, FILM COATED ORAL at 09:48

## 2022-01-01 RX ADMIN — INSULIN LISPRO 2 UNITS: 100 INJECTION, SOLUTION INTRAVENOUS; SUBCUTANEOUS at 06:48

## 2022-01-01 RX ADMIN — MAGNESIUM SULFATE HEPTAHYDRATE 4 G: 40 INJECTION, SOLUTION INTRAVENOUS at 13:54

## 2022-01-01 RX ADMIN — IPRATROPIUM BROMIDE AND ALBUTEROL 1 PUFF: 20; 100 SPRAY, METERED RESPIRATORY (INHALATION) at 06:35

## 2022-01-01 RX ADMIN — ACETAMINOPHEN 650 MG: 325 TABLET, FILM COATED ORAL at 03:51

## 2022-01-01 RX ADMIN — FENTANYL CITRATE 25 MCG: 50 INJECTION, SOLUTION INTRAMUSCULAR; INTRAVENOUS at 09:27

## 2022-01-01 RX ADMIN — GUAIFENESIN 200 MG: 200 SOLUTION ORAL at 02:14

## 2022-01-01 RX ADMIN — Medication 5 MG: at 00:13

## 2022-01-01 RX ADMIN — PROPOFOL 20 MG: 10 INJECTION, EMULSION INTRAVENOUS at 13:33

## 2022-01-01 RX ADMIN — ENOXAPARIN SODIUM 70 MG: 100 INJECTION SUBCUTANEOUS at 12:49

## 2022-01-01 RX ADMIN — PHENYTOIN 150 MG: 125 SUSPENSION ORAL at 21:09

## 2022-01-01 RX ADMIN — ATORVASTATIN CALCIUM 20 MG: 20 TABLET, FILM COATED ORAL at 10:23

## 2022-01-01 RX ADMIN — CALCIUM GLUCONATE 2 G: 20 INJECTION, SOLUTION INTRAVENOUS at 13:22

## 2022-01-01 RX ADMIN — CALCIUM CHLORIDE, MAGNESIUM CHLORIDE, SODIUM CHLORIDE, SODIUM BICARBONATE, POTASSIUM CHLORIDE AND SODIUM PHOSPHATE DIBASIC DIHYDRATE 2000 ML/HR: 3.68; 3.05; 6.34; 3.09; .314; .187 INJECTION INTRAVENOUS at 12:29

## 2022-01-01 RX ADMIN — Medication 10 ML: at 10:11

## 2022-01-01 RX ADMIN — LEVETIRACETAM 500 MG: 500 TABLET, FILM COATED ORAL at 21:53

## 2022-01-01 RX ADMIN — CALCIUM GLUCONATE 2 G: 20 INJECTION, SOLUTION INTRAVENOUS at 03:38

## 2022-01-01 RX ADMIN — IPRATROPIUM BROMIDE AND ALBUTEROL 1 PUFF: 20; 100 SPRAY, METERED RESPIRATORY (INHALATION) at 08:00

## 2022-01-01 RX ADMIN — DEXTROSE MONOHYDRATE 25 G: 25 INJECTION, SOLUTION INTRAVENOUS at 15:59

## 2022-01-01 RX ADMIN — ACETAMINOPHEN 1000 MG: 500 TABLET ORAL at 20:59

## 2022-01-01 RX ADMIN — IPRATROPIUM BROMIDE AND ALBUTEROL 1 PUFF: 20; 100 SPRAY, METERED RESPIRATORY (INHALATION) at 21:24

## 2022-01-01 RX ADMIN — Medication 1 TABLET: at 10:07

## 2022-01-01 RX ADMIN — AMLODIPINE BESYLATE 2.5 MG: 2.5 TABLET ORAL at 10:08

## 2022-01-01 RX ADMIN — IPRATROPIUM BROMIDE AND ALBUTEROL 1 PUFF: 20; 100 SPRAY, METERED RESPIRATORY (INHALATION) at 19:29

## 2022-01-01 RX ADMIN — ATORVASTATIN CALCIUM 40 MG: 40 TABLET, FILM COATED ORAL at 11:54

## 2022-01-01 RX ADMIN — DEXAMETHASONE SODIUM PHOSPHATE 6 MG: 4 INJECTION, SOLUTION INTRA-ARTICULAR; INTRALESIONAL; INTRAMUSCULAR; INTRAVENOUS; SOFT TISSUE at 10:08

## 2022-01-01 RX ADMIN — ACETAMINOPHEN 650 MG: 325 TABLET, FILM COATED ORAL at 08:39

## 2022-01-01 RX ADMIN — PANTOPRAZOLE SODIUM 40 MG: 40 INJECTION, POWDER, FOR SOLUTION INTRAVENOUS at 06:50

## 2022-01-01 RX ADMIN — ASPIRIN 81 MG CHEWABLE TABLET 81 MG: 81 TABLET CHEWABLE at 08:28

## 2022-01-01 RX ADMIN — PIPERACILLIN AND TAZOBACTAM 3.38 G: 3; .375 INJECTION, POWDER, FOR SOLUTION INTRAVENOUS at 10:22

## 2022-01-01 RX ADMIN — ENOXAPARIN SODIUM 70 MG: 100 INJECTION SUBCUTANEOUS at 08:31

## 2022-01-01 RX ADMIN — GUAIFENESIN 200 MG: 200 SOLUTION ORAL at 10:23

## 2022-01-01 RX ADMIN — CALCIUM CHLORIDE, MAGNESIUM CHLORIDE, SODIUM CHLORIDE, SODIUM BICARBONATE, POTASSIUM CHLORIDE AND SODIUM PHOSPHATE DIBASIC DIHYDRATE 2000 ML/HR: 3.68; 3.05; 6.34; 3.09; .314; .187 INJECTION INTRAVENOUS at 12:30

## 2022-01-01 RX ADMIN — DILTIAZEM HYDROCHLORIDE 30 MG: 30 TABLET, FILM COATED ORAL at 05:25

## 2022-01-01 RX ADMIN — VASOPRESSIN 0.03 UNITS/MIN: 0.2 INJECTION INTRAVENOUS at 07:36

## 2022-01-01 RX ADMIN — PHENYTOIN 150 MG: 125 SUSPENSION ORAL at 10:22

## 2022-01-01 RX ADMIN — LEVETIRACETAM 500 MG: 500 TABLET, FILM COATED ORAL at 21:09

## 2022-01-01 RX ADMIN — IPRATROPIUM BROMIDE AND ALBUTEROL 1 PUFF: 20; 100 SPRAY, METERED RESPIRATORY (INHALATION) at 10:37

## 2022-01-01 RX ADMIN — LANSOPRAZOLE 30 MG: KIT at 06:49

## 2022-01-01 RX ADMIN — HEPARIN SODIUM 5000 UNITS: 5000 INJECTION INTRAVENOUS; SUBCUTANEOUS at 21:09

## 2022-01-01 RX ADMIN — HEPARIN SODIUM 5000 UNITS: 5000 INJECTION INTRAVENOUS; SUBCUTANEOUS at 21:53

## 2022-01-01 RX ADMIN — GUAIFENESIN 200 MG: 200 SOLUTION ORAL at 09:46

## 2022-01-01 RX ADMIN — LANSOPRAZOLE 30 MG: KIT at 06:08

## 2022-01-01 RX ADMIN — IPRATROPIUM BROMIDE AND ALBUTEROL 1 PUFF: 20; 100 SPRAY, METERED RESPIRATORY (INHALATION) at 07:36

## 2022-01-01 RX ADMIN — Medication 2 PACKET: at 17:32

## 2022-01-01 RX ADMIN — DEXAMETHASONE 6 MG: 4 TABLET ORAL at 10:24

## 2022-01-01 RX ADMIN — ALBUMIN (HUMAN) 25 G: 0.25 INJECTION, SOLUTION INTRAVENOUS at 10:15

## 2022-01-01 RX ADMIN — Medication 2 PACKET: at 08:39

## 2022-01-01 RX ADMIN — ACETAMINOPHEN 650 MG: 325 TABLET, FILM COATED ORAL at 06:08

## 2022-01-01 RX ADMIN — IPRATROPIUM BROMIDE AND ALBUTEROL 1 PUFF: 20; 100 SPRAY, METERED RESPIRATORY (INHALATION) at 19:37

## 2022-01-01 RX ADMIN — PHENYTOIN 150 MG: 125 SUSPENSION ORAL at 20:04

## 2022-01-01 RX ADMIN — LANSOPRAZOLE 30 MG: KIT at 06:42

## 2022-01-01 RX ADMIN — MAGNESIUM SULFATE HEPTAHYDRATE 4 G: 40 INJECTION, SOLUTION INTRAVENOUS at 01:17

## 2022-01-01 RX ADMIN — ATORVASTATIN CALCIUM 20 MG: 20 TABLET, FILM COATED ORAL at 10:07

## 2022-01-01 RX ADMIN — INSULIN LISPRO 2 UNITS: 100 INJECTION, SOLUTION INTRAVENOUS; SUBCUTANEOUS at 12:48

## 2022-01-01 RX ADMIN — GUAIFENESIN 200 MG: 200 SOLUTION ORAL at 01:14

## 2022-01-01 RX ADMIN — ENOXAPARIN SODIUM 70 MG: 100 INJECTION SUBCUTANEOUS at 21:00

## 2022-01-01 RX ADMIN — ACETAMINOPHEN 650 MG: 325 TABLET, FILM COATED ORAL at 05:14

## 2022-01-01 RX ADMIN — Medication 10 ML: at 14:24

## 2022-01-01 RX ADMIN — IPRATROPIUM BROMIDE AND ALBUTEROL 1 PUFF: 20; 100 SPRAY, METERED RESPIRATORY (INHALATION) at 11:47

## 2022-01-01 RX ADMIN — DILTIAZEM HYDROCHLORIDE 30 MG: 30 TABLET, FILM COATED ORAL at 16:20

## 2022-01-01 RX ADMIN — BUMETANIDE 2 MG: 0.25 INJECTION, SOLUTION INTRAMUSCULAR; INTRAVENOUS at 06:28

## 2022-01-01 RX ADMIN — IPRATROPIUM BROMIDE AND ALBUTEROL 1 PUFF: 20; 100 SPRAY, METERED RESPIRATORY (INHALATION) at 06:55

## 2022-01-01 RX ADMIN — CALCIUM GLUCONATE 2 G: 20 INJECTION, SOLUTION INTRAVENOUS at 14:47

## 2022-01-01 RX ADMIN — Medication 0.5 MCG/KG/MIN: at 14:41

## 2022-01-01 RX ADMIN — CHLORHEXIDINE GLUCONATE 15 ML: 1.2 SOLUTION ORAL at 10:23

## 2022-01-01 RX ADMIN — PHENYTOIN 150 MG: 125 SUSPENSION ORAL at 09:46

## 2022-01-01 RX ADMIN — IPRATROPIUM BROMIDE AND ALBUTEROL 1 PUFF: 20; 100 SPRAY, METERED RESPIRATORY (INHALATION) at 15:48

## 2022-01-01 RX ADMIN — SODIUM CHLORIDE, POTASSIUM CHLORIDE, SODIUM LACTATE AND CALCIUM CHLORIDE 1000 ML: 600; 310; 30; 20 INJECTION, SOLUTION INTRAVENOUS at 20:59

## 2022-01-01 RX ADMIN — DILTIAZEM HYDROCHLORIDE 30 MG: 30 TABLET, FILM COATED ORAL at 21:33

## 2022-01-01 RX ADMIN — LISINOPRIL 40 MG: 20 TABLET ORAL at 09:16

## 2022-01-01 RX ADMIN — SODIUM CHLORIDE 50 ML/HR: 9 INJECTION, SOLUTION INTRAVENOUS at 10:12

## 2022-01-01 RX ADMIN — ASPIRIN 81 MG CHEWABLE TABLET 81 MG: 81 TABLET CHEWABLE at 10:08

## 2022-01-01 RX ADMIN — Medication 10 ML: at 10:46

## 2022-01-01 RX ADMIN — ASPIRIN 325 MG ORAL TABLET 325 MG: 325 PILL ORAL at 09:55

## 2022-01-01 RX ADMIN — PIPERACILLIN AND TAZOBACTAM 3.38 G: 3; .375 INJECTION, POWDER, FOR SOLUTION INTRAVENOUS at 14:50

## 2022-01-01 RX ADMIN — AMLODIPINE BESYLATE 2.5 MG: 2.5 TABLET ORAL at 08:12

## 2022-01-01 RX ADMIN — PHENYTOIN 150 MG: 125 SUSPENSION ORAL at 20:55

## 2022-01-01 RX ADMIN — TRAMADOL HYDROCHLORIDE 50 MG: 50 TABLET, COATED ORAL at 21:33

## 2022-01-01 RX ADMIN — BARIUM SULFATE 50 ML: 400 SUSPENSION ORAL at 11:30

## 2022-01-01 RX ADMIN — PIPERACILLIN AND TAZOBACTAM 3.38 G: 3; .375 INJECTION, POWDER, FOR SOLUTION INTRAVENOUS at 13:17

## 2022-01-01 RX ADMIN — PIPERACILLIN AND TAZOBACTAM 3.38 G: 3; .375 INJECTION, POWDER, FOR SOLUTION INTRAVENOUS at 20:58

## 2022-01-01 RX ADMIN — ACETAMINOPHEN 650 MG: 325 TABLET, FILM COATED ORAL at 08:29

## 2022-01-01 RX ADMIN — ENOXAPARIN SODIUM 70 MG: 100 INJECTION SUBCUTANEOUS at 08:41

## 2022-01-01 RX ADMIN — AMLODIPINE BESYLATE 2.5 MG: 2.5 TABLET ORAL at 09:47

## 2022-01-01 RX ADMIN — SODIUM CHLORIDE 2229 ML: 9 INJECTION, SOLUTION INTRAVENOUS at 06:01

## 2022-01-01 RX ADMIN — PHENYLEPHRINE HYDROCHLORIDE 0.5 MCG/KG/MIN: 10 INJECTION INTRAVENOUS at 15:39

## 2022-01-01 RX ADMIN — PROPOFOL 30 MG: 10 INJECTION, EMULSION INTRAVENOUS at 13:31

## 2022-01-01 RX ADMIN — CALCIUM GLUCONATE 2 G: 20 INJECTION, SOLUTION INTRAVENOUS at 22:39

## 2022-01-01 RX ADMIN — SODIUM CHLORIDE 50 ML/HR: 4.5 INJECTION, SOLUTION INTRAVENOUS at 15:16

## 2022-01-01 RX ADMIN — IPRATROPIUM BROMIDE AND ALBUTEROL 1 PUFF: 20; 100 SPRAY, METERED RESPIRATORY (INHALATION) at 14:42

## 2022-01-01 RX ADMIN — SODIUM CHLORIDE 100 ML/HR: 9 INJECTION, SOLUTION INTRAVENOUS at 09:59

## 2022-01-01 RX ADMIN — CALCIUM GLUCONATE 3 G: 98 INJECTION, SOLUTION INTRAVENOUS at 11:20

## 2022-01-01 RX ADMIN — Medication 10 ML: at 21:10

## 2022-01-01 RX ADMIN — DILTIAZEM HYDROCHLORIDE 30 MG: 30 TABLET, FILM COATED ORAL at 22:16

## 2022-01-01 RX ADMIN — PIPERACILLIN AND TAZOBACTAM 3.38 G: 3; .375 INJECTION, POWDER, FOR SOLUTION INTRAVENOUS at 03:40

## 2022-01-01 RX ADMIN — ACETAMINOPHEN 650 MG: 650 SUPPOSITORY RECTAL at 00:22

## 2022-01-01 RX ADMIN — SODIUM CHLORIDE 50 ML/HR: 4.5 INJECTION, SOLUTION INTRAVENOUS at 06:09

## 2022-01-01 RX ADMIN — DEXAMETHASONE 6 MG: 4 TABLET ORAL at 08:39

## 2022-01-01 RX ADMIN — Medication 50 MCG/HR: at 10:30

## 2022-01-01 RX ADMIN — DEXAMETHASONE SODIUM PHOSPHATE 6 MG: 4 INJECTION, SOLUTION INTRA-ARTICULAR; INTRALESIONAL; INTRAMUSCULAR; INTRAVENOUS; SOFT TISSUE at 04:06

## 2022-01-01 RX ADMIN — ASPIRIN 81 MG CHEWABLE TABLET 81 MG: 81 TABLET CHEWABLE at 10:23

## 2022-01-01 RX ADMIN — Medication 10 ML: at 22:45

## 2022-01-01 RX ADMIN — Medication 2 PACKET: at 23:56

## 2022-01-01 RX ADMIN — GUAIFENESIN 200 MG: 200 SOLUTION ORAL at 21:09

## 2022-01-01 RX ADMIN — PHENYTOIN 150 MG: 125 SUSPENSION ORAL at 08:28

## 2022-01-01 RX ADMIN — Medication 5 MG: at 23:01

## 2022-01-01 RX ADMIN — PIPERACILLIN AND TAZOBACTAM 3.38 G: 3; .375 INJECTION, POWDER, FOR SOLUTION INTRAVENOUS at 17:56

## 2022-01-01 RX ADMIN — VASOPRESSIN 0.03 UNITS/MIN: 0.2 INJECTION INTRAVENOUS at 16:54

## 2022-01-01 RX ADMIN — IPRATROPIUM BROMIDE AND ALBUTEROL 1 PUFF: 20; 100 SPRAY, METERED RESPIRATORY (INHALATION) at 11:30

## 2022-01-01 RX ADMIN — ASPIRIN 325 MG ORAL TABLET 325 MG: 325 PILL ORAL at 09:10

## 2022-01-01 RX ADMIN — ACETAMINOPHEN 650 MG: 325 TABLET, FILM COATED ORAL at 01:17

## 2022-01-01 RX ADMIN — SODIUM CHLORIDE 1000 ML: 0.9 INJECTION, SOLUTION INTRAVENOUS at 10:16

## 2022-01-01 RX ADMIN — ALBUMIN (HUMAN) 500 ML: 2.5 SOLUTION INTRAVENOUS at 07:36

## 2022-01-01 RX ADMIN — SODIUM CHLORIDE 75 ML/HR: 9 INJECTION, SOLUTION INTRAVENOUS at 04:37

## 2022-01-01 RX ADMIN — Medication 2 PACKET: at 16:20

## 2022-01-01 RX ADMIN — Medication 10 ML: at 08:41

## 2022-01-01 RX ADMIN — HYDRALAZINE HYDROCHLORIDE 10 MG: 20 INJECTION INTRAMUSCULAR; INTRAVENOUS at 00:12

## 2022-01-01 RX ADMIN — ALBUTEROL SULFATE 2 PUFF: 90 AEROSOL, METERED RESPIRATORY (INHALATION) at 00:20

## 2022-01-01 RX ADMIN — IPRATROPIUM BROMIDE AND ALBUTEROL 1 PUFF: 20; 100 SPRAY, METERED RESPIRATORY (INHALATION) at 10:45

## 2022-01-01 RX ADMIN — Medication 10 ML: at 21:01

## 2022-01-01 RX ADMIN — ATORVASTATIN CALCIUM 20 MG: 20 TABLET, FILM COATED ORAL at 08:12

## 2022-01-01 RX ADMIN — DEXAMETHASONE SODIUM PHOSPHATE 6 MG: 4 INJECTION, SOLUTION INTRA-ARTICULAR; INTRALESIONAL; INTRAMUSCULAR; INTRAVENOUS; SOFT TISSUE at 09:47

## 2022-01-01 RX ADMIN — Medication 1 TABLET: at 14:25

## 2022-01-01 RX ADMIN — DEXAMETHASONE SODIUM PHOSPHATE 6 MG: 4 INJECTION, SOLUTION INTRA-ARTICULAR; INTRALESIONAL; INTRAMUSCULAR; INTRAVENOUS; SOFT TISSUE at 08:12

## 2022-01-01 RX ADMIN — GUAIFENESIN 200 MG: 200 SOLUTION ORAL at 14:25

## 2022-01-01 RX ADMIN — DILTIAZEM HYDROCHLORIDE 30 MG: 30 TABLET, FILM COATED ORAL at 16:30

## 2022-01-01 RX ADMIN — ASPIRIN 81 MG CHEWABLE TABLET 81 MG: 81 TABLET CHEWABLE at 09:48

## 2022-01-01 RX ADMIN — LEVETIRACETAM 500 MG: 500 TABLET, FILM COATED ORAL at 09:11

## 2022-01-01 RX ADMIN — Medication 10 ML: at 21:39

## 2022-01-01 RX ADMIN — DEXAMETHASONE 4 MG: 4 TABLET ORAL at 08:29

## 2022-01-01 RX ADMIN — PHENYLEPHRINE HYDROCHLORIDE: 10 INJECTION INTRAVENOUS at 16:53

## 2022-01-01 RX ADMIN — DILTIAZEM HYDROCHLORIDE 30 MG: 30 TABLET, FILM COATED ORAL at 13:25

## 2022-01-01 RX ADMIN — Medication 1 TABLET: at 08:41

## 2022-01-01 RX ADMIN — MIDAZOLAM HYDROCHLORIDE 4 MG: 1 INJECTION, SOLUTION INTRAMUSCULAR; INTRAVENOUS at 18:04

## 2022-01-01 RX ADMIN — DILTIAZEM HYDROCHLORIDE 30 MG: 30 TABLET, FILM COATED ORAL at 22:21

## 2022-01-01 RX ADMIN — MIDAZOLAM HYDROCHLORIDE 2 MG: 1 INJECTION, SOLUTION INTRAMUSCULAR; INTRAVENOUS at 12:16

## 2022-01-01 RX ADMIN — ACETAMINOPHEN 650 MG: 325 TABLET, FILM COATED ORAL at 09:46

## 2022-01-01 RX ADMIN — PIPERACILLIN AND TAZOBACTAM 3.38 G: 3; .375 INJECTION, POWDER, FOR SOLUTION INTRAVENOUS at 05:25

## 2022-01-01 RX ADMIN — AMLODIPINE BESYLATE 2.5 MG: 2.5 TABLET ORAL at 10:25

## 2022-01-01 RX ADMIN — ACETAMINOPHEN 650 MG: 325 TABLET, FILM COATED ORAL at 23:01

## 2022-01-01 RX ADMIN — ACETAMINOPHEN 650 MG: 325 TABLET, FILM COATED ORAL at 22:15

## 2022-01-01 RX ADMIN — SODIUM CHLORIDE 75 ML/HR: 9 INJECTION, SOLUTION INTRAVENOUS at 10:46

## 2022-01-01 RX ADMIN — FENTANYL CITRATE 100 MCG: 50 INJECTION, SOLUTION INTRAMUSCULAR; INTRAVENOUS at 18:05

## 2022-01-01 RX ADMIN — ASPIRIN 81 MG CHEWABLE TABLET 81 MG: 81 TABLET CHEWABLE at 08:40

## 2022-01-01 RX ADMIN — GUAIFENESIN 200 MG: 200 SOLUTION ORAL at 13:26

## 2022-01-01 RX ADMIN — ENOXAPARIN SODIUM 70 MG: 100 INJECTION SUBCUTANEOUS at 10:23

## 2022-01-01 RX ADMIN — PIPERACILLIN AND TAZOBACTAM 3.38 G: 3; .375 INJECTION, POWDER, FOR SOLUTION INTRAVENOUS at 05:39

## 2022-01-01 RX ADMIN — CALCIUM GLUCONATE 2 G: 20 INJECTION, SOLUTION INTRAVENOUS at 04:58

## 2022-01-01 RX ADMIN — GLYCOPYRROLATE 0.3 MG: 0.2 INJECTION INTRAMUSCULAR; INTRAVENOUS at 18:05

## 2022-01-01 RX ADMIN — Medication 10 ML: at 20:59

## 2022-01-01 RX ADMIN — GUAIFENESIN 200 MG: 200 SOLUTION ORAL at 21:52

## 2022-01-01 RX ADMIN — Medication 10 ML: at 20:05

## 2022-01-01 RX ADMIN — ATORVASTATIN CALCIUM 20 MG: 20 TABLET, FILM COATED ORAL at 08:28

## 2022-01-01 RX ADMIN — LANSOPRAZOLE 30 MG: KIT at 05:36

## 2022-01-01 RX ADMIN — IPRATROPIUM BROMIDE AND ALBUTEROL 1 PUFF: 20; 100 SPRAY, METERED RESPIRATORY (INHALATION) at 19:09

## 2022-01-01 RX ADMIN — ACETAMINOPHEN 650 MG: 325 TABLET, FILM COATED ORAL at 16:35

## 2022-01-01 RX ADMIN — IPRATROPIUM BROMIDE AND ALBUTEROL 1 PUFF: 20; 100 SPRAY, METERED RESPIRATORY (INHALATION) at 06:43

## 2022-01-01 RX ADMIN — DILTIAZEM HYDROCHLORIDE 30 MG: 30 TABLET, FILM COATED ORAL at 21:52

## 2022-01-01 RX ADMIN — MIDAZOLAM HYDROCHLORIDE 2 MG: 1 INJECTION, SOLUTION INTRAMUSCULAR; INTRAVENOUS at 10:25

## 2022-01-01 RX ADMIN — ATORVASTATIN CALCIUM 20 MG: 20 TABLET, FILM COATED ORAL at 08:41

## 2022-01-01 RX ADMIN — PIPERACILLIN AND TAZOBACTAM 3.38 G: 3; .375 INJECTION, POWDER, FOR SOLUTION INTRAVENOUS at 20:41

## 2022-01-01 RX ADMIN — ACETAMINOPHEN 650 MG: 325 TABLET, FILM COATED ORAL at 10:54

## 2022-01-01 RX ADMIN — GUAIFENESIN 200 MG: 200 SOLUTION ORAL at 20:55

## 2022-01-01 RX ADMIN — GUAIFENESIN 200 MG: 200 SOLUTION ORAL at 08:29

## 2022-01-01 RX ADMIN — DEXAMETHASONE SODIUM PHOSPHATE 6 MG: 4 INJECTION, SOLUTION INTRA-ARTICULAR; INTRALESIONAL; INTRAMUSCULAR; INTRAVENOUS; SOFT TISSUE at 08:40

## 2022-01-01 RX ADMIN — CEFEPIME HYDROCHLORIDE 2 G: 2 INJECTION, POWDER, FOR SOLUTION INTRAVENOUS at 04:07

## 2022-01-01 RX ADMIN — SODIUM CHLORIDE 125 ML/HR: 9 INJECTION, SOLUTION INTRAVENOUS at 19:07

## 2022-01-01 RX ADMIN — SODIUM BICARBONATE: 84 INJECTION, SOLUTION INTRAVENOUS at 11:23

## 2022-01-01 RX ADMIN — PIPERACILLIN AND TAZOBACTAM 3.38 G: 3; .375 INJECTION, POWDER, FOR SOLUTION INTRAVENOUS at 05:19

## 2022-01-01 RX ADMIN — Medication 1 TABLET: at 09:48

## 2022-01-01 RX ADMIN — GUAIFENESIN 200 MG: 200 SOLUTION ORAL at 20:57

## 2022-01-01 RX ADMIN — IPRATROPIUM BROMIDE AND ALBUTEROL 1 PUFF: 20; 100 SPRAY, METERED RESPIRATORY (INHALATION) at 19:47

## 2022-01-01 RX ADMIN — FAMOTIDINE 20 MG: 20 TABLET, FILM COATED ORAL at 10:33

## 2022-01-01 RX ADMIN — PIPERACILLIN AND TAZOBACTAM 3.38 G: 3; .375 INJECTION, POWDER, FOR SOLUTION INTRAVENOUS at 12:47

## 2022-01-01 RX ADMIN — SODIUM CHLORIDE 125 ML/HR: 9 INJECTION, SOLUTION INTRAVENOUS at 03:14

## 2022-01-01 RX ADMIN — Medication 2 PACKET: at 08:28

## 2022-01-01 RX ADMIN — SODIUM CHLORIDE: 900 INJECTION, SOLUTION INTRAVENOUS at 16:54

## 2022-01-01 RX ADMIN — ASPIRIN 81 MG CHEWABLE TABLET 81 MG: 81 TABLET CHEWABLE at 10:06

## 2022-01-01 RX ADMIN — LEVETIRACETAM 500 MG: 500 TABLET, FILM COATED ORAL at 11:54

## 2022-01-01 RX ADMIN — PANTOPRAZOLE SODIUM 40 MG: 40 INJECTION, POWDER, FOR SOLUTION INTRAVENOUS at 09:16

## 2022-01-01 RX ADMIN — ASPIRIN 81 MG CHEWABLE TABLET 81 MG: 81 TABLET CHEWABLE at 10:24

## 2022-01-01 RX ADMIN — ENOXAPARIN SODIUM 70 MG: 100 INJECTION SUBCUTANEOUS at 21:11

## 2022-01-01 RX ADMIN — ACETAMINOPHEN 650 MG: 325 TABLET, FILM COATED ORAL at 06:42

## 2022-01-01 RX ADMIN — Medication 10 ML: at 09:48

## 2022-01-01 RX ADMIN — PIPERACILLIN AND TAZOBACTAM 3.38 G: 3; .375 INJECTION, POWDER, FOR SOLUTION INTRAVENOUS at 21:02

## 2022-01-01 RX ADMIN — Medication 0.2 MCG/KG/MIN: at 10:12

## 2022-01-01 RX ADMIN — DILTIAZEM HYDROCHLORIDE 30 MG: 30 TABLET, FILM COATED ORAL at 05:39

## 2022-01-01 RX ADMIN — PHENYTOIN 150 MG: 125 SUSPENSION ORAL at 00:13

## 2022-01-01 RX ADMIN — DEXAMETHASONE 4 MG: 4 TABLET ORAL at 10:23

## 2022-01-01 RX ADMIN — PIPERACILLIN AND TAZOBACTAM 3.38 G: 3; .375 INJECTION, POWDER, FOR SOLUTION INTRAVENOUS at 15:23

## 2022-01-01 RX ADMIN — CALCIUM GLUCONATE 2 G: 20 INJECTION, SOLUTION INTRAVENOUS at 12:48

## 2022-01-01 RX ADMIN — DEXAMETHASONE 4 MG: 4 TABLET ORAL at 10:07

## 2022-01-01 RX ADMIN — LISINOPRIL 40 MG: 20 TABLET ORAL at 09:55

## 2022-01-01 RX ADMIN — LANSOPRAZOLE 30 MG: KIT at 08:12

## 2022-01-01 RX ADMIN — IPRATROPIUM BROMIDE AND ALBUTEROL 1 PUFF: 20; 100 SPRAY, METERED RESPIRATORY (INHALATION) at 21:34

## 2022-01-01 RX ADMIN — HEPARIN SODIUM 5000 UNITS: 5000 INJECTION INTRAVENOUS; SUBCUTANEOUS at 09:10

## 2022-01-01 RX ADMIN — DEXAMETHASONE SODIUM PHOSPHATE 6 MG: 4 INJECTION, SOLUTION INTRA-ARTICULAR; INTRALESIONAL; INTRAMUSCULAR; INTRAVENOUS; SOFT TISSUE at 09:10

## 2022-01-01 RX ADMIN — ACETAMINOPHEN 650 MG: 650 SUPPOSITORY RECTAL at 05:26

## 2022-01-01 RX ADMIN — ACETAMINOPHEN 650 MG: 325 TABLET, FILM COATED ORAL at 16:44

## 2022-01-01 RX ADMIN — SODIUM CHLORIDE: 0.9 INJECTION, SOLUTION INTRAVENOUS at 13:29

## 2022-01-01 RX ADMIN — PHENYTOIN 150 MG: 125 SUSPENSION ORAL at 20:51

## 2022-01-01 RX ADMIN — IPRATROPIUM BROMIDE AND ALBUTEROL 1 PUFF: 20; 100 SPRAY, METERED RESPIRATORY (INHALATION) at 14:28

## 2022-01-01 RX ADMIN — ENOXAPARIN SODIUM 70 MG: 100 INJECTION SUBCUTANEOUS at 21:02

## 2022-01-01 RX ADMIN — PIPERACILLIN AND TAZOBACTAM 3.38 G: 3; .375 INJECTION, POWDER, FOR SOLUTION INTRAVENOUS at 12:16

## 2022-01-01 RX ADMIN — HEPARIN SODIUM 5000 UNITS: 5000 INJECTION INTRAVENOUS; SUBCUTANEOUS at 10:08

## 2022-01-01 RX ADMIN — Medication 10 ML: at 10:09

## 2022-01-01 RX ADMIN — ATORVASTATIN CALCIUM 20 MG: 20 TABLET, FILM COATED ORAL at 10:24

## 2022-01-01 RX ADMIN — PHENYTOIN 150 MG: 125 SUSPENSION ORAL at 20:41

## 2022-01-01 RX ADMIN — Medication 10 ML: at 21:09

## 2022-01-01 RX ADMIN — POTASSIUM PHOSPHATE, MONOBASIC AND POTASSIUM PHOSPHATE, DIBASIC 15 MMOL: 224; 236 INJECTION, SOLUTION, CONCENTRATE INTRAVENOUS at 02:15

## 2022-01-01 RX ADMIN — ATORVASTATIN CALCIUM 20 MG: 20 TABLET, FILM COATED ORAL at 10:08

## 2022-01-01 RX ADMIN — ATORVASTATIN CALCIUM 20 MG: 20 TABLET, FILM COATED ORAL at 08:40

## 2022-01-01 RX ADMIN — GUAIFENESIN 200 MG: 200 SOLUTION ORAL at 03:12

## 2022-01-01 RX ADMIN — Medication 5 MG: at 02:14

## 2022-01-01 RX ADMIN — LISINOPRIL 40 MG: 20 TABLET ORAL at 10:33

## 2022-01-01 RX ADMIN — HEPARIN SODIUM 5000 UNITS: 5000 INJECTION INTRAVENOUS; SUBCUTANEOUS at 10:33

## 2022-01-01 RX ADMIN — CALCIUM GLUCONATE 3 G: 98 INJECTION, SOLUTION INTRAVENOUS at 14:50

## 2022-01-01 RX ADMIN — SODIUM CHLORIDE 500 ML: 9 INJECTION, SOLUTION INTRAVENOUS at 04:08

## 2022-01-01 RX ADMIN — ASPIRIN 81 MG CHEWABLE TABLET 81 MG: 81 TABLET CHEWABLE at 08:39

## 2022-01-01 RX ADMIN — DILTIAZEM HYDROCHLORIDE 30 MG: 30 TABLET, FILM COATED ORAL at 05:19

## 2022-01-01 RX ADMIN — SODIUM PHOSPHATE, MONOBASIC, MONOHYDRATE 30 MMOL: 276; 142 INJECTION, SOLUTION INTRAVENOUS at 17:23

## 2022-01-01 RX ADMIN — Medication 1 TABLET: at 10:22

## 2022-01-01 RX ADMIN — SODIUM BICARBONATE 150 MEQ: 84 INJECTION, SOLUTION INTRAVENOUS at 10:20

## 2022-01-01 RX ADMIN — LANSOPRAZOLE 30 MG: KIT at 06:32

## 2022-01-01 RX ADMIN — Medication 1 TABLET: at 08:30

## 2022-01-01 RX ADMIN — GUAIFENESIN 200 MG: 200 SOLUTION ORAL at 08:40

## 2022-01-01 RX ADMIN — Medication 10 ML: at 08:12

## 2022-01-01 RX ADMIN — GUAIFENESIN 200 MG: 200 SOLUTION ORAL at 15:23

## 2022-01-01 RX ADMIN — PIPERACILLIN AND TAZOBACTAM 3.38 G: 3; .375 INJECTION, POWDER, FOR SOLUTION INTRAVENOUS at 20:49

## 2022-08-18 PROBLEM — U07.1 COVID-19: Status: ACTIVE | Noted: 2022-01-01

## 2022-08-20 PROBLEM — I10 HTN (HYPERTENSION): Status: ACTIVE | Noted: 2019-01-10

## 2022-08-20 PROBLEM — R07.89 CHEST PRESSURE: Status: ACTIVE | Noted: 2020-02-05

## 2022-08-20 PROBLEM — E44.0 MODERATE MALNUTRITION (HCC): Status: ACTIVE | Noted: 2022-01-01

## 2022-08-23 NOTE — ANESTHESIA PREPROCEDURE EVALUATION
Anesthesia Evaluation     Patient summary reviewed and Nursing notes reviewed   NPO Solid Status: > 8 hours  NPO Liquid Status: > 8 hours           Airway   Mallampati: II  TM distance: >3 FB  Neck ROM: full  No difficulty expected  Dental - normal exam     Pulmonary - normal exam   (+) pneumonia ,   Cardiovascular - normal exam    (+) hypertension,       Neuro/Psych  (+) seizures, CVA, dementia,    GI/Hepatic/Renal/Endo - negative ROS     Musculoskeletal (-) negative ROS    Abdominal  - normal exam    Bowel sounds: normal.   Substance History - negative use     OB/GYN negative ob/gyn ROS         Other                        Anesthesia Plan    ASA 4     MAC   total IV anesthesia  intravenous induction     Anesthetic plan, risks, benefits, and alternatives have been provided, discussed and informed consent has been obtained with: patient.  Pre-procedure education provided      CODE STATUS:    Level Of Support Discussed With: Patient  Code Status (Patient has no pulse and is not breathing): CPR (Attempt to Resuscitate)  Medical Interventions (Patient has pulse or is breathing): Full Support

## 2022-08-23 NOTE — ANESTHESIA POSTPROCEDURE EVALUATION
Patient: Neptali Vera    Procedure Summary     Date: 08/23/22 Room / Location: [facility] ENDO VIRTUAL RM /  [facility]    Anesthesia Start: 1329 Anesthesia Stop: 1337    Procedure: BRONCHOSCOPY with bronchial washing AT BEDSIDE (N/A Bronchus) Diagnosis:       COVID-19      (COVID-19 [U07.1])    Surgeons: Madi Adrian MD Provider: Farhad Lorenzo MD    Anesthesia Type: MAC ASA Status: 4          Anesthesia Type: MAC    Vitals  Vitals Value Taken Time   /49 08/23/22 1404   Temp     Pulse 60 08/23/22 1404   Resp     SpO2 94 % 08/23/22 1404   Vitals shown include unvalidated device data.        Post Anesthesia Care and Evaluation    Patient location during evaluation: PACU  Patient participation: complete - patient participated  Level of consciousness: awake  Pain scale: See nurse's notes for pain score.  Pain management: adequate    Airway patency: patent  Anesthetic complications: No anesthetic complications  PONV Status: none  Cardiovascular status: acceptable  Respiratory status: acceptable  Hydration status: acceptable    Comments: Patient seen and examined postoperatively; vital signs stable; SpO2 greater than or equal to 90%; cardiopulmonary status stable; nausea/vomiting adequately controlled; pain adequately controlled; no apparent anesthesia complications; patient discharged from anesthesia care when discharge criteria were met

## 2022-08-25 PROBLEM — N17.9 AKI (ACUTE KIDNEY INJURY) (HCC): Status: ACTIVE | Noted: 2022-01-01

## 2022-08-25 PROBLEM — I63.9 STROKE (HCC): Chronic | Status: ACTIVE | Noted: 2019-09-15

## 2022-08-25 PROBLEM — J96.01 ACUTE RESPIRATORY FAILURE WITH HYPOXIA (HCC): Status: ACTIVE | Noted: 2022-01-01

## 2022-08-25 PROBLEM — N17.9 AKI (ACUTE KIDNEY INJURY) (HCC): Status: RESOLVED | Noted: 2022-01-01 | Resolved: 2022-01-01

## 2022-08-25 PROBLEM — E87.0 HYPERNATREMIA: Status: ACTIVE | Noted: 2022-01-01

## 2022-08-25 PROBLEM — I10 HTN (HYPERTENSION): Chronic | Status: ACTIVE | Noted: 2019-01-10

## 2022-08-25 PROBLEM — R56.9 SEIZURE: Chronic | Status: ACTIVE | Noted: 2022-01-01

## 2022-08-25 PROBLEM — I48.91 ATRIAL FIBRILLATION: Chronic | Status: ACTIVE | Noted: 2022-01-01

## 2022-08-31 VITALS
WEIGHT: 163.8 LBS | DIASTOLIC BLOOD PRESSURE: 68 MMHG | HEIGHT: 68 IN | RESPIRATION RATE: 30 BRPM | OXYGEN SATURATION: 85 % | SYSTOLIC BLOOD PRESSURE: 145 MMHG | TEMPERATURE: 95.2 F | BODY MASS INDEX: 24.83 KG/M2 | HEART RATE: 69 BPM

## 2022-08-31 LAB
ALBUMIN SERPL ELPH-MCNC: 2.5 G/DL (ref 2.9–4.4)
ALBUMIN/GLOB SERPL: 2.3 {RATIO} (ref 0.7–1.7)
ALPHA1 GLOB SERPL ELPH-MCNC: 0.1 G/DL (ref 0–0.4)
ALPHA2 GLOB SERPL ELPH-MCNC: 0.4 G/DL (ref 0.4–1)
B-GLOBULIN SERPL ELPH-MCNC: 0.3 G/DL (ref 0.7–1.3)
BACTERIA SPEC AEROBE CULT: NORMAL
BACTERIA SPEC AEROBE CULT: NORMAL
BH BB BLOOD EXPIRATION DATE: NORMAL
BH BB BLOOD TYPE BARCODE: 5100
BH BB DISPENSE STATUS: NORMAL
BH BB PRODUCT CODE: NORMAL
BH BB UNIT NUMBER: NORMAL
CROSSMATCH INTERPRETATION: NORMAL
GAMMA GLOB SERPL ELPH-MCNC: 0.3 G/DL (ref 0.4–1.8)
GLOBULIN SER CALC-MCNC: 1.1 G/DL (ref 2.2–3.9)
LABORATORY COMMENT REPORT: ABNORMAL
M PROTEIN SERPL ELPH-MCNC: ABNORMAL G/DL
PROT PATTERN SERPL ELPH-IMP: ABNORMAL
PROT SERPL-MCNC: 3.6 G/DL (ref 6–8.5)
UNIT  ABO: NORMAL
UNIT  RH: NORMAL

## 2022-09-01 LAB — QT INTERVAL: 433 MS

## 2022-09-03 LAB
BACTERIA SPEC AEROBE CULT: NORMAL
BACTERIA SPEC AEROBE CULT: NORMAL

## 2022-09-20 LAB — FUNGUS WND CULT: NORMAL

## 2022-10-04 LAB
MYCOBACTERIUM SPEC CULT: NORMAL
NIGHT BLUE STAIN TISS: NORMAL

## (undated) DEVICE — BAPTIST FLOYD BRONCHOSCOPY: Brand: MEDLINE INDUSTRIES, INC.